# Patient Record
Sex: FEMALE | Race: WHITE
[De-identification: names, ages, dates, MRNs, and addresses within clinical notes are randomized per-mention and may not be internally consistent; named-entity substitution may affect disease eponyms.]

---

## 2017-04-18 ENCOUNTER — PRIOR ORIGINAL RECORDS (OUTPATIENT)
Dept: OTHER | Age: 82
End: 2017-04-18

## 2017-05-04 ENCOUNTER — PRIOR ORIGINAL RECORDS (OUTPATIENT)
Dept: OTHER | Age: 82
End: 2017-05-04

## 2017-05-04 LAB
CHOLESTEROL, TOTAL: 145 MG/DL
HDL CHOLESTEROL: 73 MG/DL
LDL CHOLESTEROL: 57 MG/DL
TRIGLYCERIDES: 73 MG/DL

## 2017-08-17 ENCOUNTER — HOSPITAL ENCOUNTER (EMERGENCY)
Age: 82
Discharge: HOME | End: 2017-08-17
Attending: EMERGENCY MEDICINE
Payer: OTHER MISCELLANEOUS

## 2017-08-17 VITALS
OXYGEN SATURATION: 98 % | TEMPERATURE: 98 F | SYSTOLIC BLOOD PRESSURE: 172 MMHG | HEART RATE: 62 BPM | BODY MASS INDEX: 15.7 KG/M2 | HEIGHT: 67 IN | WEIGHT: 100 LBS | RESPIRATION RATE: 18 BRPM | DIASTOLIC BLOOD PRESSURE: 98 MMHG

## 2017-08-17 DIAGNOSIS — S51.811A FOREARM LACERATION, RIGHT, INITIAL ENCOUNTER: Primary | ICD-10-CM

## 2017-08-17 PROCEDURE — 12004 RPR S/N/AX/GEN/TRK7.6-12.5CM: CPT

## 2017-08-17 PROCEDURE — 12004 RPR S/N/AX/GEN/TRK7.6-12.5CM: CPT | Performed by: NURSE PRACTITIONER

## 2017-08-17 PROCEDURE — 99283 EMERGENCY DEPT VISIT LOW MDM: CPT

## 2017-08-17 PROCEDURE — 99282 EMERGENCY DEPT VISIT SF MDM: CPT

## 2017-08-17 RX ORDER — SIMVASTATIN 20 MG
20 TABLET ORAL NIGHTLY
COMMUNITY

## 2017-08-17 NOTE — ED PROVIDER NOTES
Patient Seen in: [facility] In [place]    History   Patient presents with:  Laceration Abrasion (integumentary)    Stated Complaint: abrasion to right arm, work injury    22-year-old female who presents to the emergency room with comp All other systems reviewed and negative except as noted above. PSFH elements reviewed from today and agreed except as otherwise stated in HPI.     Physical Exam   ED Triage Vitals [08/17/17 1305]  BP: (!) 172/98  Pulse: 62  Resp: 18  Temp: 97.9 °F (36.6 ------------------------------------------------------------  MDM    I discussed the diagnosis and need for followup with their primary care physician for further evaluation and care.  Patient states they understand diagnosis, followup plan and agree with a

## 2017-08-17 NOTE — ED PROVIDER NOTES
I reviewed that chart and discussed the case. I have examined the patient and noted laceration to the right forearm, no tenderness to the right shoulder elbow wrist or hand. Radial ulnar median nerve tested and intact bilaterally.   Patient had laceration

## 2017-08-17 NOTE — ED INITIAL ASSESSMENT (HPI)
ABRASION TO RIGHT FOREARM. PT WORKS AT [employer] AND SCRAPED IT ON A DISPLAY.  STATES \"NEVER \" HAD TETANUS

## 2017-08-18 ENCOUNTER — HOSPITAL ENCOUNTER (EMERGENCY)
Age: 82
Discharge: HOME | End: 2017-08-18
Payer: OTHER MISCELLANEOUS

## 2017-08-18 VITALS
SYSTOLIC BLOOD PRESSURE: 167 MMHG | HEART RATE: 65 BPM | BODY MASS INDEX: 18.88 KG/M2 | OXYGEN SATURATION: 98 % | DIASTOLIC BLOOD PRESSURE: 82 MMHG | RESPIRATION RATE: 20 BRPM | TEMPERATURE: 99 F | WEIGHT: 100 LBS | HEIGHT: 61 IN

## 2017-08-18 DIAGNOSIS — Z51.89 VISIT FOR WOUND CHECK: Primary | ICD-10-CM

## 2017-08-18 PROCEDURE — 99281 EMR DPT VST MAYX REQ PHY/QHP: CPT

## 2017-08-18 NOTE — ED PROVIDER NOTES
Patient Seen in: [facility] Emergency Department In [place]    History   Patient presents with:  Laceration Abrasion (integumentary)    Stated Complaint: wound check-here for dressing change.     77-year-old female who presents to the emergency room for a agreed except as otherwise stated in HPI.     Physical Exam   ED Triage Vitals [08/18/17 1055]  BP: (!) 167/82  Pulse: 65  Resp: 20  Temp: 98.5 °F (36.9 °C)  Temp src: Temporal  SpO2: 98 %  O2 Device: None (Room air)    Current:BP (!) 167/82   Pulse 65   Te diagnosis)    Disposition:  Discharge    Follow-up:  [address] [place] [address]  [phone]  In 3 day(s)        Medications Prescribed:  Current Discharge Medication List

## 2017-10-26 ENCOUNTER — PRIOR ORIGINAL RECORDS (OUTPATIENT)
Dept: OTHER | Age: 82
End: 2017-10-26

## 2018-04-12 ENCOUNTER — PRIOR ORIGINAL RECORDS (OUTPATIENT)
Dept: OTHER | Age: 83
End: 2018-04-12

## 2018-04-26 ENCOUNTER — PRIOR ORIGINAL RECORDS (OUTPATIENT)
Dept: OTHER | Age: 83
End: 2018-04-26

## 2018-05-10 LAB
ALBUMIN: 4.1 G/DL
ALKALINE PHOSPHATATE(ALK PHOS): 119 IU/L
BILIRUBIN TOTAL: 0.6 MG/DL
BUN: 22 MG/DL
CALCIUM: 9.4 MG/DL
CHLORIDE: 105 MEQ/L
CHOLESTEROL, TOTAL: 148 MG/DL
CREATININE, SERUM: 0.92 MG/DL
GLOBULIN: 2.2 G/DL
GLUCOSE: 86 MG/DL
HDL CHOLESTEROL: 73 MG/DL
HEMATOCRIT: 43.1 %
HEMOGLOBIN: 14.6 G/DL
LDL CHOLESTEROL: 61 MG/DL
PLATELETS: 255 K/UL
POTASSIUM, SERUM: 4.2 MEQ/L
PROTEIN, TOTAL: 6.3 G/DL
RED BLOOD COUNT: 4.8 X 10-6/U
SGOT (AST): 25 IU/L
SGPT (ALT): 16 IU/L
SODIUM: 142 MEQ/L
TRIGLYCERIDES: 64 MG/DL
VITAMIN D 25-OH: 63 NG/ML
WHITE BLOOD COUNT: 6.9 X 10-3/U

## 2018-05-11 ENCOUNTER — PRIOR ORIGINAL RECORDS (OUTPATIENT)
Dept: OTHER | Age: 83
End: 2018-05-11

## 2018-05-22 ENCOUNTER — HOSPITAL ENCOUNTER (OUTPATIENT)
Dept: CV DIAGNOSTICS | Age: 83
Discharge: HOME | End: 2018-05-22
Attending: INTERNAL MEDICINE
Payer: MEDICARE

## 2018-05-22 DIAGNOSIS — I65.23 BILATERAL CAROTID ARTERY STENOSIS: ICD-10-CM

## 2018-05-25 ENCOUNTER — PRIOR ORIGINAL RECORDS (OUTPATIENT)
Dept: OTHER | Age: 83
End: 2018-05-25

## 2018-05-29 ENCOUNTER — PRIOR ORIGINAL RECORDS (OUTPATIENT)
Dept: OTHER | Age: 83
End: 2018-05-29

## 2018-10-18 ENCOUNTER — PRIOR ORIGINAL RECORDS (OUTPATIENT)
Dept: OTHER | Age: 83
End: 2018-10-18

## 2018-10-18 ENCOUNTER — MYAURORA ACCOUNT LINK (OUTPATIENT)
Dept: OTHER | Age: 83
End: 2018-10-18

## 2019-01-01 ENCOUNTER — EXTERNAL RECORD (OUTPATIENT)
Dept: HEALTH INFORMATION MANAGEMENT | Facility: OTHER | Age: 84
End: 2019-01-01

## 2019-02-28 VITALS
WEIGHT: 100 LBS | HEIGHT: 62 IN | SYSTOLIC BLOOD PRESSURE: 136 MMHG | BODY MASS INDEX: 18.4 KG/M2 | DIASTOLIC BLOOD PRESSURE: 62 MMHG | HEART RATE: 56 BPM

## 2019-02-28 VITALS — SYSTOLIC BLOOD PRESSURE: 102 MMHG | DIASTOLIC BLOOD PRESSURE: 56 MMHG | HEART RATE: 64 BPM | WEIGHT: 101 LBS

## 2019-02-28 VITALS — WEIGHT: 96 LBS | DIASTOLIC BLOOD PRESSURE: 50 MMHG | HEART RATE: 64 BPM | SYSTOLIC BLOOD PRESSURE: 102 MMHG

## 2019-03-01 VITALS
BODY MASS INDEX: 18.77 KG/M2 | HEART RATE: 68 BPM | DIASTOLIC BLOOD PRESSURE: 60 MMHG | SYSTOLIC BLOOD PRESSURE: 102 MMHG | WEIGHT: 102 LBS | HEIGHT: 62 IN

## 2019-03-22 RX ORDER — SIMVASTATIN 40 MG
TABLET ORAL
COMMUNITY
Start: 2016-09-09

## 2019-04-17 LAB
ALT SERPL-CCNC: 13 U/L (ref 6–29)
AST SERPL-CCNC: 25 U/L (ref 10–35)
BUN SERPL-MCNC: 24 MG/DL (ref 7–25)
BUN/CREAT SERPL: 22 (CALC) (ref 6–22)
CALCIUM SERPL-MCNC: 9.9 MG/DL (ref 8.6–10.4)
CHLORIDE SERPL-SCNC: 106 MMOL/L (ref 98–110)
CHOLEST SERPL-MCNC: 148 MG/DL
CHOLEST/HDLC SERPL: 2 (CALC)
CO2 SERPL-SCNC: 32 MMOL/L (ref 20–32)
CREAT SERPL-MCNC: 1.1 MG/DL (ref 0.6–0.88)
GFRSERPLBLD MDRD-ARVRAT: 44 ML/MIN/1.73M2
GLUCOSE SERPL-MCNC: 95 MG/DL (ref 65–99)
HDLC SERPL-MCNC: 73 MG/DL
LDLC SERPL CALC-MCNC: 61 MG/DL (CALC)
NONHDLC SERPL-MCNC: 75 MG/DL (CALC)
POTASSIUM SERPL-SCNC: 3.9 MMOL/L (ref 3.5–5.3)
SODIUM SERPL-SCNC: 143 MMOL/L (ref 135–146)
TRIGL SERPL-MCNC: 64 MG/DL

## 2019-04-25 ENCOUNTER — TELEPHONE (OUTPATIENT)
Dept: CARDIOLOGY | Age: 84
End: 2019-04-25

## 2019-05-02 ENCOUNTER — OFFICE VISIT (OUTPATIENT)
Dept: CARDIOLOGY | Age: 84
End: 2019-05-02

## 2019-05-02 VITALS
BODY MASS INDEX: 18.58 KG/M2 | DIASTOLIC BLOOD PRESSURE: 52 MMHG | HEIGHT: 62 IN | HEART RATE: 60 BPM | WEIGHT: 101 LBS | SYSTOLIC BLOOD PRESSURE: 114 MMHG

## 2019-05-02 DIAGNOSIS — I25.10 CAD IN NATIVE ARTERY: Primary | ICD-10-CM

## 2019-05-02 DIAGNOSIS — Z95.5 HISTORY OF HEART ARTERY STENT: ICD-10-CM

## 2019-05-02 DIAGNOSIS — I65.23 ASYMPTOMATIC CAROTID ARTERY STENOSIS, BILATERAL: ICD-10-CM

## 2019-05-02 DIAGNOSIS — E78.00 PURE HYPERCHOLESTEROLEMIA: ICD-10-CM

## 2019-05-02 PROCEDURE — 99214 OFFICE O/P EST MOD 30 MIN: CPT | Performed by: INTERNAL MEDICINE

## 2019-07-23 ENCOUNTER — EXTERNAL RECORD (OUTPATIENT)
Dept: HEALTH INFORMATION MANAGEMENT | Facility: OTHER | Age: 84
End: 2019-07-23

## 2019-08-13 ENCOUNTER — MED INFO FORMS (OUTPATIENT)
Dept: DERMATOLOGY | Age: 84
End: 2019-08-13

## 2019-08-21 ENCOUNTER — OFFICE VISIT (OUTPATIENT)
Dept: DERMATOLOGY | Age: 84
End: 2019-08-21

## 2019-08-21 DIAGNOSIS — L65.8 FEMALE PATTERN ALOPECIA: Primary | ICD-10-CM

## 2019-08-21 PROCEDURE — 99202 OFFICE O/P NEW SF 15 MIN: CPT | Performed by: DERMATOLOGY

## 2019-11-21 ENCOUNTER — OFFICE VISIT (OUTPATIENT)
Dept: CARDIOLOGY | Age: 84
End: 2019-11-21

## 2019-11-21 DIAGNOSIS — Z95.5 HISTORY OF HEART ARTERY STENT: ICD-10-CM

## 2019-11-21 DIAGNOSIS — I25.10 CAD IN NATIVE ARTERY: Primary | ICD-10-CM

## 2019-11-21 DIAGNOSIS — E78.00 PURE HYPERCHOLESTEROLEMIA: ICD-10-CM

## 2019-11-21 DIAGNOSIS — I65.23 ASYMPTOMATIC CAROTID ARTERY STENOSIS, BILATERAL: ICD-10-CM

## 2019-11-21 ASSESSMENT — PATIENT HEALTH QUESTIONNAIRE - PHQ9
SUM OF ALL RESPONSES TO PHQ9 QUESTIONS 1 AND 2: 0
1. LITTLE INTEREST OR PLEASURE IN DOING THINGS: NOT AT ALL
2. FEELING DOWN, DEPRESSED OR HOPELESS: NOT AT ALL
SUM OF ALL RESPONSES TO PHQ9 QUESTIONS 1 AND 2: 0
SUM OF ALL RESPONSES TO PHQ9 QUESTIONS 1 AND 2: 0
2. FEELING DOWN, DEPRESSED OR HOPELESS: NOT AT ALL
1. LITTLE INTEREST OR PLEASURE IN DOING THINGS: NOT AT ALL

## 2020-01-23 ENCOUNTER — OFFICE VISIT (OUTPATIENT)
Dept: CARDIOLOGY | Age: 85
End: 2020-01-23

## 2020-01-23 VITALS
BODY MASS INDEX: 17.85 KG/M2 | WEIGHT: 97 LBS | HEIGHT: 62 IN | HEART RATE: 60 BPM | SYSTOLIC BLOOD PRESSURE: 102 MMHG | DIASTOLIC BLOOD PRESSURE: 50 MMHG

## 2020-01-23 DIAGNOSIS — E78.00 PURE HYPERCHOLESTEROLEMIA: ICD-10-CM

## 2020-01-23 DIAGNOSIS — Z95.5 HISTORY OF HEART ARTERY STENT: ICD-10-CM

## 2020-01-23 DIAGNOSIS — I65.23 ASYMPTOMATIC CAROTID ARTERY STENOSIS, BILATERAL: ICD-10-CM

## 2020-01-23 DIAGNOSIS — I25.10 CAD IN NATIVE ARTERY: Primary | ICD-10-CM

## 2020-01-23 PROCEDURE — 99214 OFFICE O/P EST MOD 30 MIN: CPT | Performed by: INTERNAL MEDICINE

## 2020-01-23 ASSESSMENT — PATIENT HEALTH QUESTIONNAIRE - PHQ9
SUM OF ALL RESPONSES TO PHQ9 QUESTIONS 1 AND 2: 0
2. FEELING DOWN, DEPRESSED OR HOPELESS: NOT AT ALL
1. LITTLE INTEREST OR PLEASURE IN DOING THINGS: NOT AT ALL
SUM OF ALL RESPONSES TO PHQ9 QUESTIONS 1 AND 2: 0

## 2020-03-29 ENCOUNTER — APPOINTMENT (OUTPATIENT)
Dept: GENERAL RADIOLOGY | Facility: HOSPITAL | Age: 85
DRG: 470 | End: 2020-03-29
Attending: EMERGENCY MEDICINE
Payer: OTHER MISCELLANEOUS

## 2020-03-29 ENCOUNTER — HOSPITAL ENCOUNTER (INPATIENT)
Facility: HOSPITAL | Age: 85
LOS: 3 days | Discharge: HOME HEALTH | DRG: 470 | End: 2020-04-01
Attending: EMERGENCY MEDICINE | Admitting: INTERNAL MEDICINE
Payer: OTHER MISCELLANEOUS
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
## 2020-03-29 DIAGNOSIS — S72.002A CLOSED LEFT HIP FRACTURE, INITIAL ENCOUNTER (HCC): Primary | ICD-10-CM

## 2020-03-29 DIAGNOSIS — S72.009A HIP FRACTURE (HCC): ICD-10-CM

## 2020-03-29 PROBLEM — D72.829 LEUKOCYTOSIS: Status: ACTIVE | Noted: 2020-03-29

## 2020-03-29 PROBLEM — R79.89 AZOTEMIA: Status: ACTIVE | Noted: 2020-03-29

## 2020-03-29 PROBLEM — R73.9 HYPERGLYCEMIA: Status: ACTIVE | Noted: 2020-03-29

## 2020-03-29 PROBLEM — E87.20 METABOLIC ACIDOSIS: Status: ACTIVE | Noted: 2020-03-29

## 2020-03-29 PROBLEM — E87.1 HYPONATREMIA: Status: ACTIVE | Noted: 2020-03-29

## 2020-03-29 PROBLEM — E87.2 METABOLIC ACIDOSIS: Status: ACTIVE | Noted: 2020-03-29

## 2020-03-29 PROCEDURE — 71045 X-RAY EXAM CHEST 1 VIEW: CPT | Performed by: EMERGENCY MEDICINE

## 2020-03-29 PROCEDURE — 99223 1ST HOSP IP/OBS HIGH 75: CPT | Performed by: INTERNAL MEDICINE

## 2020-03-29 PROCEDURE — 73502 X-RAY EXAM HIP UNI 2-3 VIEWS: CPT | Performed by: EMERGENCY MEDICINE

## 2020-03-29 RX ORDER — METOCLOPRAMIDE HYDROCHLORIDE 5 MG/ML
10 INJECTION INTRAMUSCULAR; INTRAVENOUS EVERY 8 HOURS PRN
Status: DISCONTINUED | OUTPATIENT
Start: 2020-03-29 | End: 2020-03-29

## 2020-03-29 RX ORDER — DOCUSATE SODIUM 100 MG/1
100 CAPSULE, LIQUID FILLED ORAL 2 TIMES DAILY
Status: DISCONTINUED | OUTPATIENT
Start: 2020-03-29 | End: 2020-03-30

## 2020-03-29 RX ORDER — HYDROCODONE BITARTRATE AND ACETAMINOPHEN 5; 325 MG/1; MG/1
2 TABLET ORAL EVERY 4 HOURS PRN
Status: DISCONTINUED | OUTPATIENT
Start: 2020-03-29 | End: 2020-03-30

## 2020-03-29 RX ORDER — HYDROCODONE BITARTRATE AND ACETAMINOPHEN 5; 325 MG/1; MG/1
1 TABLET ORAL EVERY 4 HOURS PRN
Status: DISCONTINUED | OUTPATIENT
Start: 2020-03-29 | End: 2020-03-30

## 2020-03-29 RX ORDER — ACETAMINOPHEN 325 MG/1
650 TABLET ORAL EVERY 4 HOURS PRN
Status: DISCONTINUED | OUTPATIENT
Start: 2020-03-29 | End: 2020-03-30

## 2020-03-29 RX ORDER — ATORVASTATIN CALCIUM 10 MG/1
10 TABLET, FILM COATED ORAL NIGHTLY
Status: DISCONTINUED | OUTPATIENT
Start: 2020-03-29 | End: 2020-04-01

## 2020-03-29 RX ORDER — HYDROMORPHONE HYDROCHLORIDE 1 MG/ML
0.2 INJECTION, SOLUTION INTRAMUSCULAR; INTRAVENOUS; SUBCUTANEOUS EVERY 2 HOUR PRN
Status: DISCONTINUED | OUTPATIENT
Start: 2020-03-29 | End: 2020-03-30

## 2020-03-29 RX ORDER — HEPARIN SODIUM 5000 [USP'U]/ML
5000 INJECTION, SOLUTION INTRAVENOUS; SUBCUTANEOUS EVERY 8 HOURS SCHEDULED
Status: DISCONTINUED | OUTPATIENT
Start: 2020-03-29 | End: 2020-03-30

## 2020-03-29 RX ORDER — ONDANSETRON 2 MG/ML
4 INJECTION INTRAMUSCULAR; INTRAVENOUS EVERY 6 HOURS PRN
Status: DISCONTINUED | OUTPATIENT
Start: 2020-03-29 | End: 2020-04-01

## 2020-03-29 RX ORDER — SODIUM CHLORIDE 9 MG/ML
INJECTION, SOLUTION INTRAVENOUS CONTINUOUS
Status: ACTIVE | OUTPATIENT
Start: 2020-03-29 | End: 2020-03-29

## 2020-03-29 RX ORDER — SODIUM CHLORIDE 9 MG/ML
1000 INJECTION, SOLUTION INTRAVENOUS ONCE
Status: COMPLETED | OUTPATIENT
Start: 2020-03-29 | End: 2020-03-29

## 2020-03-29 RX ORDER — MORPHINE SULFATE 4 MG/ML
2 INJECTION, SOLUTION INTRAMUSCULAR; INTRAVENOUS ONCE
Status: COMPLETED | OUTPATIENT
Start: 2020-03-29 | End: 2020-03-29

## 2020-03-29 RX ORDER — HYDROMORPHONE HYDROCHLORIDE 1 MG/ML
0.8 INJECTION, SOLUTION INTRAMUSCULAR; INTRAVENOUS; SUBCUTANEOUS EVERY 2 HOUR PRN
Status: DISCONTINUED | OUTPATIENT
Start: 2020-03-29 | End: 2020-03-30

## 2020-03-29 RX ORDER — HYDROMORPHONE HYDROCHLORIDE 1 MG/ML
0.4 INJECTION, SOLUTION INTRAMUSCULAR; INTRAVENOUS; SUBCUTANEOUS EVERY 2 HOUR PRN
Status: DISCONTINUED | OUTPATIENT
Start: 2020-03-29 | End: 2020-03-30

## 2020-03-29 RX ORDER — POLYETHYLENE GLYCOL 3350 17 G/17G
17 POWDER, FOR SOLUTION ORAL DAILY PRN
Status: DISCONTINUED | OUTPATIENT
Start: 2020-03-29 | End: 2020-03-30

## 2020-03-29 RX ORDER — BISACODYL 10 MG
10 SUPPOSITORY, RECTAL RECTAL
Status: DISCONTINUED | OUTPATIENT
Start: 2020-03-29 | End: 2020-03-30

## 2020-03-29 RX ORDER — METOCLOPRAMIDE HYDROCHLORIDE 5 MG/ML
5 INJECTION INTRAMUSCULAR; INTRAVENOUS EVERY 8 HOURS PRN
Status: DISCONTINUED | OUTPATIENT
Start: 2020-03-29 | End: 2020-04-01

## 2020-03-29 NOTE — ED NOTES
, Abran Juárez, called for an update and asked this RN to contact him at [phone]. This RN called him back and it went to voicemail. Left voicemail and number for him to return our call.

## 2020-03-29 NOTE — ED INITIAL ASSESSMENT (HPI)
81 yo F presents with left hip pain and xray results from physician immediate care showing possible left femoral neck fracture. Pain only with movement. Unable to ambulate.  Pt reports her feet got caught in the grocery cart while working at [employer] and

## 2020-03-29 NOTE — ED NOTES
Per MD verbal order, give 2 mg of MS for pain. Patient is requesting pain medication but is unable to rate her pain on pain scale at this time.

## 2020-03-29 NOTE — ED NOTES
Cheikh Roque at pt's work who drove patient here states that if we cannot get ahold of  and patient is discharged that we can call him   Phone: [phone]

## 2020-03-29 NOTE — H&P
[facility] HOSPITALIST  History and Physical     Kimmy Patel Patient Status:  Emergency    1927 MRN [id number]   Location [facility] Attending Inge Suarez MD   Hosp Day # 0 PCP Rissa Muñoz     Chief Complaint: Wing Salcedo daily., Disp: , Rfl:         Review of Systems:   A comprehensive 14 point review of systems was completed. Pertinent positives and negatives noted in the HPI.     Physical Exam:    /56   Pulse 62   Temp 98.2 °F (36.8 °C) (Temporal)   Resp 17   Ht Fall  3. HTN  4. HLD  5.  CAD history on baby asa    Quality:  · DVT Prophylaxis: Heparin  · CODE status: Full  · Gibbons: no    Plan of care discussed with patient in ED    Mitul Griffiths DO  3/29/2020

## 2020-03-29 NOTE — ED PROVIDER NOTES
Patient Seen in: [facility] Emergency Department      History   Patient presents with:  Fall    Stated Complaint: fall while at work at [employer], had XR which showed probable femoral neck fracture*    HPI    Patient is a pleasant  70-year-old female pr (Temporal)   Resp 14   Ht 165.1 cm (5' 5\")   Wt 43.1 kg   SpO2 98%   BMI 15.81 kg/m²         Physical Exam    Vital signs noted. GENERAL: Patient is awake and alert, resting comfortably on the cart, in no apparent distress.    HEENT: Head is without evid following orders were created for panel order CBC WITH DIFFERENTIAL WITH PLATELET.   Procedure                               Abnormality         Status                     ---------                               -----------         ------ hip.  There is complete obliteration of the right hip joint with flattening and deformity of the right femoral head, subchondral sclerosis and hyper trophic osseous changes consistent with marked degenerative change.   Also noted is degenerative change invo Closed left hip fracture, initial encounter [facility]) S72.002A 3/29/2020 Unknown    Hyperglycemia R73.9 3/29/2020 Yes    Hyponatremia E87.1 3/29/2020 Yes    Leukocytosis D72.829 5/66/8753 Yes    Metabolic acidosis O96.3 1/04/3131 Yes

## 2020-03-30 ENCOUNTER — APPOINTMENT (OUTPATIENT)
Dept: GENERAL RADIOLOGY | Facility: HOSPITAL | Age: 85
DRG: 470 | End: 2020-03-30
Attending: ORTHOPAEDIC SURGERY
Payer: OTHER MISCELLANEOUS

## 2020-03-30 ENCOUNTER — ANESTHESIA (OUTPATIENT)
Dept: SURGERY | Facility: HOSPITAL | Age: 85
DRG: 470 | End: 2020-03-30
Payer: OTHER MISCELLANEOUS

## 2020-03-30 ENCOUNTER — ANESTHESIA EVENT (OUTPATIENT)
Dept: SURGERY | Facility: HOSPITAL | Age: 85
DRG: 470 | End: 2020-03-30
Payer: OTHER MISCELLANEOUS

## 2020-03-30 PROCEDURE — 0SRS019 REPLACEMENT OF LEFT HIP JOINT, FEMORAL SURFACE WITH METAL SYNTHETIC SUBSTITUTE, CEMENTED, OPEN APPROACH: ICD-10-PCS | Performed by: ORTHOPAEDIC SURGERY

## 2020-03-30 PROCEDURE — 99221 1ST HOSP IP/OBS SF/LOW 40: CPT | Performed by: ORTHOPAEDIC SURGERY

## 2020-03-30 PROCEDURE — 99232 SBSQ HOSP IP/OBS MODERATE 35: CPT | Performed by: INTERNAL MEDICINE

## 2020-03-30 PROCEDURE — 27236 TREAT THIGH FRACTURE: CPT | Performed by: ORTHOPAEDIC SURGERY

## 2020-03-30 PROCEDURE — 73501 X-RAY EXAM HIP UNI 1 VIEW: CPT | Performed by: ORTHOPAEDIC SURGERY

## 2020-03-30 PROCEDURE — 3E0T3BZ INTRODUCTION OF ANESTHETIC AGENT INTO PERIPHERAL NERVES AND PLEXI, PERCUTANEOUS APPROACH: ICD-10-PCS | Performed by: ANESTHESIOLOGY

## 2020-03-30 DEVICE — IMPLANTABLE DEVICE: Type: IMPLANTABLE DEVICE | Site: HIP | Status: FUNCTIONAL

## 2020-03-30 DEVICE — CEMENT BONE RADIOPAQ HOWMEDICA: Type: IMPLANTABLE DEVICE | Site: HIP | Status: FUNCTIONAL

## 2020-03-30 DEVICE — KIT FEM BONE CEMENT RESTRICTOR: Type: IMPLANTABLE DEVICE | Site: HIP | Status: FUNCTIONAL

## 2020-03-30 RX ORDER — TRANEXAMIC ACID 10 MG/ML
INJECTION, SOLUTION INTRAVENOUS AS NEEDED
Status: DISCONTINUED | OUTPATIENT
Start: 2020-03-30 | End: 2020-03-30 | Stop reason: SURG

## 2020-03-30 RX ORDER — CEFAZOLIN SODIUM/WATER 2 G/20 ML
2 SYRINGE (ML) INTRAVENOUS EVERY 8 HOURS
Status: COMPLETED | OUTPATIENT
Start: 2020-03-31 | End: 2020-03-31

## 2020-03-30 RX ORDER — BISACODYL 10 MG
10 SUPPOSITORY, RECTAL RECTAL
Status: DISCONTINUED | OUTPATIENT
Start: 2020-03-30 | End: 2020-04-01

## 2020-03-30 RX ORDER — CALCIUM CARBONATE/VITAMIN D3 250-3.125
2 TABLET ORAL
Status: DISCONTINUED | OUTPATIENT
Start: 2020-03-31 | End: 2020-04-01

## 2020-03-30 RX ORDER — LIDOCAINE HYDROCHLORIDE 10 MG/ML
INJECTION, SOLUTION EPIDURAL; INFILTRATION; INTRACAUDAL; PERINEURAL AS NEEDED
Status: DISCONTINUED | OUTPATIENT
Start: 2020-03-30 | End: 2020-03-30 | Stop reason: SURG

## 2020-03-30 RX ORDER — ASPIRIN 325 MG
325 TABLET ORAL 2 TIMES DAILY
Status: DISCONTINUED | OUTPATIENT
Start: 2020-03-31 | End: 2020-04-01

## 2020-03-30 RX ORDER — ONDANSETRON 2 MG/ML
4 INJECTION INTRAMUSCULAR; INTRAVENOUS AS NEEDED
Status: DISCONTINUED | OUTPATIENT
Start: 2020-03-30 | End: 2020-03-30 | Stop reason: HOSPADM

## 2020-03-30 RX ORDER — TRAMADOL HYDROCHLORIDE 50 MG/1
50 TABLET ORAL EVERY 6 HOURS PRN
Status: DISCONTINUED | OUTPATIENT
Start: 2020-03-30 | End: 2020-03-31

## 2020-03-30 RX ORDER — HYDROMORPHONE HYDROCHLORIDE 1 MG/ML
0.4 INJECTION, SOLUTION INTRAMUSCULAR; INTRAVENOUS; SUBCUTANEOUS EVERY 5 MIN PRN
Status: DISCONTINUED | OUTPATIENT
Start: 2020-03-30 | End: 2020-03-30 | Stop reason: HOSPADM

## 2020-03-30 RX ORDER — TRANEXAMIC ACID 10 MG/ML
1000 INJECTION, SOLUTION INTRAVENOUS ONCE
Status: DISCONTINUED | OUTPATIENT
Start: 2020-03-30 | End: 2020-03-30 | Stop reason: HOSPADM

## 2020-03-30 RX ORDER — DIPHENHYDRAMINE HYDROCHLORIDE 50 MG/ML
12.5 INJECTION INTRAMUSCULAR; INTRAVENOUS ONCE AS NEEDED
Status: COMPLETED | OUTPATIENT
Start: 2020-03-30 | End: 2020-03-30

## 2020-03-30 RX ORDER — SENNOSIDES 8.6 MG
17.2 TABLET ORAL NIGHTLY
Status: DISCONTINUED | OUTPATIENT
Start: 2020-03-30 | End: 2020-04-01

## 2020-03-30 RX ORDER — POTASSIUM CHLORIDE 14.9 MG/ML
20 INJECTION INTRAVENOUS ONCE
Status: COMPLETED | OUTPATIENT
Start: 2020-03-30 | End: 2020-03-30

## 2020-03-30 RX ORDER — HYDROMORPHONE HYDROCHLORIDE 1 MG/ML
0.2 INJECTION, SOLUTION INTRAMUSCULAR; INTRAVENOUS; SUBCUTANEOUS EVERY 2 HOUR PRN
Status: DISCONTINUED | OUTPATIENT
Start: 2020-03-30 | End: 2020-04-01

## 2020-03-30 RX ORDER — PHENYLEPHRINE HCL 10 MG/ML
VIAL (ML) INJECTION AS NEEDED
Status: DISCONTINUED | OUTPATIENT
Start: 2020-03-30 | End: 2020-03-30 | Stop reason: SURG

## 2020-03-30 RX ORDER — SODIUM CHLORIDE, SODIUM LACTATE, POTASSIUM CHLORIDE, CALCIUM CHLORIDE 600; 310; 30; 20 MG/100ML; MG/100ML; MG/100ML; MG/100ML
INJECTION, SOLUTION INTRAVENOUS CONTINUOUS
Status: DISCONTINUED | OUTPATIENT
Start: 2020-03-30 | End: 2020-03-30 | Stop reason: HOSPADM

## 2020-03-30 RX ORDER — DEXAMETHASONE SODIUM PHOSPHATE 4 MG/ML
VIAL (ML) INJECTION AS NEEDED
Status: DISCONTINUED | OUTPATIENT
Start: 2020-03-30 | End: 2020-03-30 | Stop reason: SURG

## 2020-03-30 RX ORDER — SODIUM PHOSPHATE, DIBASIC AND SODIUM PHOSPHATE, MONOBASIC 7; 19 G/133ML; G/133ML
1 ENEMA RECTAL ONCE AS NEEDED
Status: DISCONTINUED | OUTPATIENT
Start: 2020-03-30 | End: 2020-04-01

## 2020-03-30 RX ORDER — POLYETHYLENE GLYCOL 3350 17 G/17G
17 POWDER, FOR SOLUTION ORAL DAILY PRN
Status: DISCONTINUED | OUTPATIENT
Start: 2020-03-30 | End: 2020-04-01

## 2020-03-30 RX ORDER — CELECOXIB 200 MG/1
200 CAPSULE ORAL 2 TIMES DAILY
COMMUNITY

## 2020-03-30 RX ORDER — DIPHENHYDRAMINE HYDROCHLORIDE 50 MG/ML
INJECTION INTRAMUSCULAR; INTRAVENOUS
Status: COMPLETED
Start: 2020-03-30 | End: 2020-03-30

## 2020-03-30 RX ORDER — DOCUSATE SODIUM 100 MG/1
100 CAPSULE, LIQUID FILLED ORAL 2 TIMES DAILY
Status: DISCONTINUED | OUTPATIENT
Start: 2020-03-30 | End: 2020-04-01

## 2020-03-30 RX ORDER — NALOXONE HYDROCHLORIDE 0.4 MG/ML
80 INJECTION, SOLUTION INTRAMUSCULAR; INTRAVENOUS; SUBCUTANEOUS AS NEEDED
Status: DISCONTINUED | OUTPATIENT
Start: 2020-03-30 | End: 2020-03-30 | Stop reason: HOSPADM

## 2020-03-30 RX ORDER — DIPHENHYDRAMINE HYDROCHLORIDE 50 MG/ML
25 INJECTION INTRAMUSCULAR; INTRAVENOUS ONCE AS NEEDED
Status: DISCONTINUED | OUTPATIENT
Start: 2020-03-30 | End: 2020-03-30 | Stop reason: HOSPADM

## 2020-03-30 RX ORDER — SODIUM CHLORIDE, SODIUM LACTATE, POTASSIUM CHLORIDE, CALCIUM CHLORIDE 600; 310; 30; 20 MG/100ML; MG/100ML; MG/100ML; MG/100ML
INJECTION, SOLUTION INTRAVENOUS CONTINUOUS
Status: DISCONTINUED | OUTPATIENT
Start: 2020-03-30 | End: 2020-03-31

## 2020-03-30 RX ORDER — CEFAZOLIN SODIUM 1 G/3ML
INJECTION, POWDER, FOR SOLUTION INTRAMUSCULAR; INTRAVENOUS AS NEEDED
Status: DISCONTINUED | OUTPATIENT
Start: 2020-03-30 | End: 2020-03-30 | Stop reason: SURG

## 2020-03-30 RX ORDER — HYDROMORPHONE HYDROCHLORIDE 2 MG/1
1 TABLET ORAL
Status: DISCONTINUED | OUTPATIENT
Start: 2020-03-30 | End: 2020-04-01

## 2020-03-30 RX ORDER — ACETAMINOPHEN 500 MG
1000 TABLET ORAL EVERY 8 HOURS
Status: DISCONTINUED | OUTPATIENT
Start: 2020-03-30 | End: 2020-04-01

## 2020-03-30 RX ORDER — ROCURONIUM BROMIDE 10 MG/ML
INJECTION, SOLUTION INTRAVENOUS AS NEEDED
Status: DISCONTINUED | OUTPATIENT
Start: 2020-03-30 | End: 2020-03-30 | Stop reason: SURG

## 2020-03-30 RX ADMIN — CEFAZOLIN SODIUM 2 G: 1 INJECTION, POWDER, FOR SOLUTION INTRAMUSCULAR; INTRAVENOUS at 16:32:00

## 2020-03-30 RX ADMIN — LIDOCAINE HYDROCHLORIDE 50 MG: 10 INJECTION, SOLUTION EPIDURAL; INFILTRATION; INTRACAUDAL; PERINEURAL at 16:18:00

## 2020-03-30 RX ADMIN — TRANEXAMIC ACID 1000 MG: 10 INJECTION, SOLUTION INTRAVENOUS at 16:48:00

## 2020-03-30 RX ADMIN — PHENYLEPHRINE HCL 50 MCG: 10 MG/ML VIAL (ML) INJECTION at 16:28:00

## 2020-03-30 RX ADMIN — ROCURONIUM BROMIDE 50 MG: 10 INJECTION, SOLUTION INTRAVENOUS at 16:18:00

## 2020-03-30 RX ADMIN — DEXAMETHASONE SODIUM PHOSPHATE 4 MG: 4 MG/ML VIAL (ML) INJECTION at 16:40:00

## 2020-03-30 RX ADMIN — PHENYLEPHRINE HCL 50 MCG: 10 MG/ML VIAL (ML) INJECTION at 16:49:00

## 2020-03-30 RX ADMIN — PHENYLEPHRINE HCL 50 MCG: 10 MG/ML VIAL (ML) INJECTION at 16:35:00

## 2020-03-30 NOTE — PROGRESS NOTES
JA[facility] HOSPITALIST  Progress Note     Delores Kocher Patient Status:  Inpatient    1927 MRN [id number]   [facility] 2NE-A Attending Marcial Chavez DO   Hosp Day # 1 PCP Criss Reyes     Chief Complaint: mech fall  Left hip injur 2. Analgesia, bowel regiment  3. PT/OT   Post op   4.   2. Mechanical Fall  3. HTN  4. HLD  5.  CAD history on baby asa     PLAN:  Total left hip today cbc bmp am   Try to avoid narcotics post op to avoid confusion    Labs in am     Quality:  · DVT Prophy

## 2020-03-30 NOTE — CONSULTS
[facility] Ortho Consult Note    CC: left hip pain    HPI: This 80year old female admitted from ER for mechanical fall with left hip pain. Patient works at [employer] and was trying to reach around a cart when she fell over onto her left side.  Unable to bear weight d traumatic displaced left femoral neck fracture      Labs: INR 1.04  Hgb 12.0  Cr 0.75  MRSA neg  Albumin 3.5      Assessment/Plan:  80year old female with acute closed traumatic displaced left hip femoral neck fracture.  Discussed etiology as well as treat

## 2020-03-30 NOTE — PLAN OF CARE
Pt admitted from ED after a fall at [facility] [place], operated by [facility] while she was working. Closed L hip fx. Pt is A&O. On room air. SCDs to BLE. NPO. Last BM 3/28/20. Voiding without difficulty via bedpan. Pain controlled with PO medications.  Bed alarm is on and pt reminded to [facility]

## 2020-03-30 NOTE — ANESTHESIA PROCEDURE NOTES
Regional Block  Performed by: Khloe Murray MD  Authorized by: Khloe Murray MD       General Information and Staff    Start Time:  3/30/2020 4:20 PM  End Time:  3/30/2020 4:21 PM  Anesthesiologist:  Khloe Murray MD  Performed by:   Anesthes

## 2020-03-30 NOTE — PLAN OF CARE
Pt received part of IV potassium ordered per Electrolyte Protocol. Pt c/o pain at IV site. Potassium IV rate slowed down. Pt still c/o pain to IV site. IV site flushed. Pt refusing for IV potassium to continue. IV potassium removed.  Next Potassium level to

## 2020-03-30 NOTE — ANESTHESIA PROCEDURE NOTES
Airway  Date/Time: 3/30/2020 4:18 PM  Urgency: elective      General Information and Staff    Patient location during procedure: OR  Anesthesiologist: Hiral Ren MD  Performed: anesthesiologist     Indications and Patient Condition  Indications for

## 2020-03-30 NOTE — ANESTHESIA PREPROCEDURE EVALUATION
PRE-OP EVALUATION    Patient Name: Gisela Henson    Pre-op Diagnosis: Hip fracture ([address] Jake Hand    Procedure(s):  LEFT POSTERIOR CEMENTED HEMIARTHROPLASTY    Surgeon(s) and Role:     María Elena Wynn MD - Primary    Pre-op vitals reviewed.   Temp: 17 g, Oral, Daily PRN  bisacodyl (DULCOLAX) rectal suppository 10 mg, 10 mg, Rectal, Daily PRN  ondansetron HCl (ZOFRAN) injection 4 mg, 4 mg, Intravenous, Q6H PRN  Metoclopramide HCl (REGLAN) injection 5 mg, 5 mg, Intravenous, Q8H PRN        Outpatient Me ROM: full Cardiovascular    Cardiovascular exam normal.  Rhythm: regular  Rate: normal  (-) murmur   Dental    No notable dental history. Pulmonary    Pulmonary exam normal.  Breath sounds clear to auscultation bilaterally.                Other find

## 2020-03-30 NOTE — PROGRESS NOTES
Pharmacy renal dose adjustment for metoclopramide (Reglan)    Gene Heading has been prescribed metoclopramide 10 mg every 8 hours as needed for nausea/vomiting. Estimated Creatinine Clearance: 28.1 mL/min (based on SCr of 0.87 mg/dL).     The estim

## 2020-03-31 PROBLEM — I10 ESSENTIAL HYPERTENSION: Chronic | Status: ACTIVE | Noted: 2020-03-31

## 2020-03-31 PROBLEM — E78.5 HYPERLIPIDEMIA: Status: ACTIVE | Noted: 2020-03-31

## 2020-03-31 PROBLEM — I25.10 CAD (CORONARY ARTERY DISEASE): Chronic | Status: ACTIVE | Noted: 2020-03-31

## 2020-03-31 PROCEDURE — 99232 SBSQ HOSP IP/OBS MODERATE 35: CPT | Performed by: INTERNAL MEDICINE

## 2020-03-31 RX ORDER — TRAMADOL HYDROCHLORIDE 50 MG/1
50 TABLET ORAL EVERY 12 HOURS PRN
Status: DISCONTINUED | OUTPATIENT
Start: 2020-03-31 | End: 2020-04-01

## 2020-03-31 NOTE — CM/SW NOTE
03/31/20 1100   CM/SW Referral Data   Referral Source Social Work (self-referral)   Reason for Referral Discharge planning   Informant Patient   Patient Info   Patient's Mental Status Alert;Oriented   Patient's Home Environment [facility]

## 2020-03-31 NOTE — PROGRESS NOTES
Received from [facility] bed,S/P LT. HIP CEMENTED HEMIARTHROPLASTY,IVF infusing,dressing dry/intact,abductor pillow in placed. Routine post-op care rendered,will monitor.

## 2020-03-31 NOTE — PAYOR COMM NOTE
--------------  ADMISSION REVIEW     Payor: WORKERS COMP  Subscriber #:  [id number]  Authorization Number: PENDING    Admit date: 3/29/20  Admit time: [address] [facility]       Admitting Physician: Yaneli Diaz DO  Attending Physician:  Nikhil Ortiz MD  Primary Car All other systems reviewed and negative except as noted above.     Physical Exam     ED Triage Vitals   BP 03/29/20 1612 (!) 183/71   Pulse 03/29/20 1612 64   Resp 03/29/20 1612 16   Temp 03/29/20 1612 98.2 °F (36.8 °C)   Temp src 03/29/20 1612 Temporal   S All other components within normal limits   CBC W/ DIFFERENTIAL - Abnormal; Notable for the following components:    WBC 15.7 (*)     RDW-SD 50.3 (*)     Neutrophil Absolute Prelim 13.44 (*)     Neutrophil Absolute 13.44 (*)     All other components withi 3/29/2020  5:53 pm    Present on Admission           ICD-10-CM Noted POA    Azotemia R79.89 3/29/2020 Yes    Closed left hip fracture, initial encounter [facility]) S72.002A 3/29/2020 Unknown    Hyperglycemia R73.9 3/29/2020 Yes    Hyponatremia E87.1 3/29/2020 Y Past Surgical History: History reviewed. No pertinent surgical history.     Family History: She denies history of heart disease, Mother  in 5 from Natural causes    Allergies: No Known Allergies    Medications:  No current facility-administered medic Estimated Creatinine Clearance: 28.1 mL/min (based on SCr of 0.87 mg/dL). Lab 03/29/20  1642   PTP 13.9   INR 1.04     Imaging: Imaging data reviewed in Epic. ASSESSMENT / PLAN:     1. Left Femoral neck fracture  1.  Ortho to evaluate for timing of surg 80year old female with acute closed traumatic displaced left hip femoral neck fracture. Discussed etiology as well as treatment options, with recommendation for surgical treatment as long as patient is medically fit to undergo surgery.  Risks and benefits Indications: Patient is a 63-year-old female who sustained the above fracture from a mechanical fall. We discussed treatment options with recommendation for surgical fixation. See initial consultation note for further details.   Patient elected to proceed After informed consent, the left lower extremity was marked. Patient was brought to the operating room where general plus regional block anesthesia was induced.   Patient was placed in lateral decubitus position with a peg board, with all bony prominences cartilaginous or labral defects. Next, the hip was flexed and internally rotated to expose the proximal femur. Soft tissue was removed from the medial aspect of the greater trochanter.   A box cutting osteotome was used to remove remaining lateral femoral pain cocktail. The posterior capsule and piriformis were repaired using nonabsorbable suture through drill holes in the posterior greater trochanter.   A deep drain was placed, and closure was performed with 2 Ethibond for fascia, 0 Vicryl for deep fat, 2- Indications of Use: HYPOCALCEMIA  Start: 03/31/20 0800      0925-Given   1326-Given   1700        ceFAZolin sodium (ANCEF/KEFZOL) 2 GM/20ML premix IV syringe 2 g   Dose: 2 g  Freq: Every 8 hours Route: IV  Start: 03/31/20 0000 End: 03/31/20 4621    Order s Last Dose: 20 mEq (03/30/20 1229)  Start: 03/30/20 1030 End: 03/30/20 1429     1229-New Bag             Povidone-Iodine 10 % 17.5 mL in sodium chloride 0.9 % 500 mL irrigation   Freq:  Once (Intra-Op) Route: IR  Start: 03/30/20 0919 End: 03/30/20 1934     1

## 2020-03-31 NOTE — PLAN OF CARE
Pt alert and oriented x4. Pt cleared for surgery today with Dr. Luis Campos. Pt received CHG baths x2. Denies left hip pain at rest. Able to turn onto bedpan to void. Pt transported to surgery.

## 2020-03-31 NOTE — PLAN OF CARE
V/S stable,drowsy but awakens easily. Encouraged to take sips of water,able to swallow without difficulty,IS reinforced. Lt.hip dressing dry/intact. ice pack in placed. Good and palpable pedal pulses,unable to dorsiflex yet on the left.foot. Denies pain at this

## 2020-03-31 NOTE — PHYSICAL THERAPY NOTE
PHYSICAL THERAPY EVALUATION - INPATIENT     Room Number: 7869/0244-T  Evaluation Date: 3/31/2020  Type of Evaluation: Initial  Physician Order: PT Eval and Treat    Presenting Problem: s/p left posterior cemented hemiarthroplasty 3/30/2020  Reason fo High fall risk    WEIGHT BEARING RESTRICTION  Weight Bearing Restriction: L lower extremity           L Lower Extremity: Weight Bearing as Tolerated    PAIN ASSESSMENT  Ratin  Location: denies pain at this time  Management Techniques:  Activity promotio another person does the patient currently need. ..   -   Moving to and from a bed to a chair (including a wheelchair)?: A Little   -   Need to walk in hospital room?: A Lot   -   Climbing 3-5 steps with a railing?: A Lot       AM-PAC Score:  Raw Score: 16 left cemented hemiarthroplasty 3/30/2020. Pertinent comorbidities and personal factors impacting therapy include advanced age, htn.   In this PT evaluation, the patient presents with the following impairments dec strength left LE, impaired balance, impaire

## 2020-03-31 NOTE — PLAN OF CARE
Patient c/o not being able swallow solid food at dinner time, had turkey sandwich and feels like it is getting stuck in throat, able to swallow pills whole. Speech eval ordered.

## 2020-03-31 NOTE — PROGRESS NOTES
[facility] Pharmacy Note:  Renal Dose Adjustment for Tramadol Karlynn Section)    Nelia Stuart has been prescribed Tramadol (ULTRAM) 50 mg orally every 6 hours as needed for pain. Estimated Creatinine Clearance: 27.8 mL/min (based on SCr of 0.88 mg/dL).     Her ca

## 2020-03-31 NOTE — DIETARY NOTE
[address] [facility] [address] [facility]    Pt does not meet malnutrition criteria at this time.      NUTRITION DIAGNOSIS/PROBLEM:    Underweight related to physiologic causes and possible inadequate energy intake as evidenced by BMI less than 23 for calories per kg)  Protein: 56-65 grams protein/day (1.3-1.5 grams protein per kg)  Fluid: ~1 ml/kcal or per MD discretion    MONITOR AND EVALUATE/NUTRITION GOALS:    1. PO intake to meet at least 75% patient nutrition prescription   2.  Pt to consume 1 of 2

## 2020-03-31 NOTE — OPERATIVE REPORT
Operative Note    Patient Name/: Chalino Bonner 1927  Date: 3/30/2020  Location: [facility]  Preoperative Diagnosis: Acute closed traumatic displaced left hip femoral neck fracture  Postoperative Diagnosis: Same  Operation: Posterior cemente internally rotated, the gluteus medius was identified, and a blunt cobra retractor was placed underneath.   Bere Dennison was used to identify the gluteus minimus next to the piriformis, which were  followed by placement of the blunt cobra underneath the and neck removed, and broach handle reattached. The size 16 standard stem was opened, and the canal was prepared for cementing. A cement restrictor was placed 1 cm distal to the tip of the stem.   The canal was then washed out, dried, and a sponge with ep

## 2020-03-31 NOTE — PLAN OF CARE
Patient up in chair with PT this morning, WBAT. Started on  mg BID for DVT prophylaxis. Left hip aquacel dressing dry and intact. B/P low this am, iv infusing, Dr. Ibeth Golden aware, will continue to monitor.

## 2020-03-31 NOTE — PROGRESS NOTES
[facility] HOSPITALIST  Progress Note     Nelia Stuart Patient Status:  Inpatient    1927 MRN [id number]   [facility] 2NE-A Attending Nakul Loomis MD    Hosp Day # 2 PCP Ancelmo Irene     Chief Complaint: mech fall  Left hip injur 1. Left Femoral neck fracture  1. Ortho   Following  3/30 s/p  posterior cemented l hip hemiarthroplasty      2. Analgesia, bowel regiment  3. PT/OT    rec [facility]  Pt refusing  Wants to go home   4. Follow Hgb   2. Mechanical Fall  3. HTN - BB   4.  HLD-  [facility]

## 2020-04-01 ENCOUNTER — MED REC SCAN ONLY (OUTPATIENT)
Dept: ORTHOPEDICS CLINIC | Facility: CLINIC | Age: 85
End: 2020-04-01

## 2020-04-01 VITALS
WEIGHT: 95 LBS | SYSTOLIC BLOOD PRESSURE: 130 MMHG | OXYGEN SATURATION: 99 % | HEART RATE: 85 BPM | BODY MASS INDEX: 15.83 KG/M2 | TEMPERATURE: 99 F | RESPIRATION RATE: 16 BRPM | DIASTOLIC BLOOD PRESSURE: 56 MMHG | HEIGHT: 65 IN

## 2020-04-01 PROCEDURE — 99239 HOSP IP/OBS DSCHRG MGMT >30: CPT | Performed by: HOSPITALIST

## 2020-04-01 RX ORDER — CALCIUM CARBONATE/VITAMIN D3 250-3.125
2 TABLET ORAL
Qty: 100 TABLET | Refills: 0 | Status: SHIPPED | OUTPATIENT
Start: 2020-04-01

## 2020-04-01 RX ORDER — ASPIRIN 81 MG/1
81 TABLET ORAL 2 TIMES DAILY
Status: DISCONTINUED | OUTPATIENT
Start: 2020-04-01 | End: 2020-04-01

## 2020-04-01 RX ORDER — ACETAMINOPHEN 500 MG
1000 TABLET ORAL EVERY 8 HOURS
Qty: 100 TABLET | Refills: 0 | Status: SHIPPED | OUTPATIENT
Start: 2020-04-01 | End: 2020-07-22 | Stop reason: ALTCHOICE

## 2020-04-01 RX ORDER — ASPIRIN 81 MG/1
81 TABLET ORAL 2 TIMES DAILY
Qty: 84 TABLET | Refills: 0 | Status: SHIPPED | OUTPATIENT
Start: 2020-04-01 | End: 2020-05-13

## 2020-04-01 RX ORDER — PSEUDOEPHEDRINE HCL 30 MG
100 TABLET ORAL 2 TIMES DAILY PRN
Qty: 60 CAPSULE | Refills: 0 | Status: SHIPPED | OUTPATIENT
Start: 2020-04-01

## 2020-04-01 NOTE — CM/SW NOTE
Spoke to Pt's RN this morning, and RN stated that pt is adamant on not going to [facility]. RN plans on speaking to pt's daughter in regards to discharge plans. SW requested to have RN follow up w/ SW to discuss plans and potential need for Pasha Pereira.  Will continue to

## 2020-04-01 NOTE — PHYSICAL THERAPY NOTE
PHYSICAL THERAPY TREATMENT NOTE - INPATIENT    Room Number: 2640/3957-U     Session: 1   Number of Visits to Meet Established Goals: 5    Presenting Problem: s/p left posterior cemented hemiarthroplasty 3/30/2020  History related to current admission: Pt Turning over in bed (including adjusting bedclothes, sheets and blankets)?: A Little   -   Sitting down on and standing up from a chair with arms (e.g., wheelchair, bedside commode, etc.): A Little   -   Moving from lying on back to sitting on the side of what we will do, if she wants to come home she can come home\". Discussed pt's current high fall risk, pt's spouse stated \"we always have 911, I want her to be happy and if coming home makes her happy that's what we will do\".     Discussed with pt and sp Treatment Plan: Bed mobility; Endurance; Energy conservation;Patient education;Gait training;Range of motion;Strengthening;Transfer training  Rehab Potential : Good  Frequency (Obs): Daily    CURRENT GOALS      Goal #1 Patient is able to demonstrate supine -

## 2020-04-01 NOTE — PLAN OF CARE
Pt up with Pt/OT this am, plan for home with [place] today. RA, VSS. BP stable. ASA pt refusing, need to have ortho switch this for DVT prophylaxis. WBAT. Min assist with walker. Will continue to monitor.

## 2020-04-01 NOTE — PLAN OF CARE
PT AOX4 this PM. VSS on RA. Aquacel to L hip CDI, warm dry extremities, palpable pulses, moderate strength in legs. Up and ambulating to bathroom with min assist gait belt and walker. Voiding freely.  Drinking ensures for nutritional supplement, speech to s

## 2020-04-01 NOTE — PROGRESS NOTES
[facility] HOSPITALIST  Progress Note     Erika Rodriguez Patient Status:  Inpatient    1927 MRN [id number]   [facility] 2NE-A Attending Natalie Schmidt MD    Hosp Day # 3 PCP Michael Garcia     Chief Complaint: mech fall  Left hip inju input(s): DAVID GONZALEZ in the last 168 hours. Imaging: Imaging data reviewed in Epic. ASSESSMENT / PLAN:   1. Left Femoral neck fracture  1. Ortho   Following  3/30 s/p  posterior cemented l hip hemiarthroplasty      2. Analgesia, bowel regiment  3.  PT/OT

## 2020-04-01 NOTE — SLP NOTE
ADULT SWALLOWING EVALUATION    ASSESSMENT    ASSESSMENT/OVERALL IMPRESSION:  Order received for bedside swallow evaluation to r/o aspiration.  Pt is a 79y/o year old female who presented s/p fall with hip pain; dx with complete impacted L femoral neck frac stated \"I don't need to do any of those things now honey, I'm just fine. \" Reiterated importance of aspiration precautions to patient given her age and situation with good understanding reported.  Will continue to follow to assess tolerance with diet recom Motion: Within Functional Limits    Voice Quality: Clear  Respiratory Status: Unlabored  Consistencies Trialed: Thin liquids;Puree;Hard solid  Method of Presentation: Self presentation;Spoon;Cup;Straw;Single sips; Consecutive swallows  Patient Positioning:

## 2020-04-01 NOTE — OCCUPATIONAL THERAPY NOTE
OCCUPATIONAL THERAPY EVALUATION - INPATIENT     Room Number: 5061/5500-J  Evaluation Date: 3/31/2020  Type of Evaluation: Initial  Presenting Problem: L hip fx s/p L hip cemented hemiarthroplasty on 3/30     Physician Order: IP Consult to Occupational Ther SUBJECTIVE   \"I did ballet. \"     Patient self-stated goal is to go home      OBJECTIVE  Precautions: Hip abduction pillow;SKYLAR - posterior;Bed/chair alarm  Fall Risk: High fall risk    WEIGHT BEARING RESTRICTION  Weight Bearing Restriction: L lower ex and min assistance with mod verbal/visual/tactile cues for safety with RW use and hand placement. Pt performed functional mobility with min assistance and RW x approx 50ft>bathroom.  Pt performed a toilet T/F with min assist and toileting including ji-car or therapy record   Specific performance deficits impacting engagement in ADL/IADL MODERATE  3 - 5 performance deficits   Client Assessment/Performance Deficits MODERATE - Comorbidities and min to mod modifications of tasks    Clinical Decision Making MODE

## 2020-04-01 NOTE — PAYOR COMM NOTE
--------------  CONTINUED STAY REVIEW    Payor: [employer]  Subscriber #:  [id number]  Authorization Number: PENDING    Admit date: 3/29/20  Admit time: 1941    Admitting Physician: Zita Borrero DO  Attending Physician:  Ksenia Briggs MD  Primary Ca Resp:  [10-19] 18  BP: ()/() 111/94    Lab 03/29/20  1642 03/30/20  0718 03/31/20  0625   WBC 15.7* 10.4 11.2*   HGB 14.3 12.0 10.4*   MCV 97.2 95.7 97.1   .0 206.0 189.0   INR 1.04  --   --      Lab 03/29/20  1642 03/29/20  1742 03/30/2 Order received for bedside swallow evaluation to r/o aspiration. Pt is a 81y/o year old female who presented s/p fall with hip pain; dx with complete impacted L femoral neck fracture; s/p L posterior cemented hemiarthroplasty.  Pmhx includes CAD w/stent pl Dicussed results of exam, diet recommendations, compensatory strategies and plan with pt and RN who was present in room. Pt reported good understanding and stated \"I don't need to do any of those things now honey, I'm just fine. \" Reiterated importance of Freq: Every 8 hours Route: IV  Start: 03/31/20 0000 End: 03/31/20 1642    Order specific questions:   What infection is this being used to treat?  Surgical prophylaxis  What is the anticipated duration of therapy? <48 hours    0044-Given   0924-Given

## 2020-04-01 NOTE — CM/SW NOTE
Spoke to [place] at [facility]. She stated that the workman's comp case is still pending, and the pt did not indicate that she wished to pursue outside treatment when the incident occurred.  Sterling stated that it may take a couple days before danny

## 2020-04-01 NOTE — PLAN OF CARE
Dr. Emil Drake called during his current surgery, does not need to see pt prior to d/c and ok to d/c home from ortho standpoint, will f/up in 2 weeks.

## 2020-04-03 ENCOUNTER — TELEPHONE (OUTPATIENT)
Dept: CARDIOLOGY | Age: 85
End: 2020-04-03

## 2020-04-03 NOTE — DISCHARGE SUMMARY
[facility] HOSPITALIST  DISCHARGE SUMMARY     Bennye Denver Patient Status:  Inpatient    1927 MRN [id number]   [facility] 2NE-A Attending No att. providers found   Hosp Day # 3 PCP Mindy Hernandez     Date of Admission: 3/29/2020  Date Will be on twice daily aspirin for DVT prophylaxis. Patient refused to have us contact her daughter would not share phone number not in epic anywhere.   Patient discharged home with instructions for follow-up with orthopedics and her PCP vitals have been constipation. Quantity:  60 capsule  Refills:  0     Sennosides 17.2 MG Tabs      Take 1 tablet (17.2 mg total) by mouth nightly.    Quantity:  30 tablet  Refills:  0        CONTINUE taking these medications      Instructions Prescription details   celeco bowel sounds. Musculoskeletal: Moves all extremities. Extremities: No edema.   Left hip drsg d/i  No edema   -----------------------------------------------------------------------------------------------  PATIENT DISCHARGE INSTRUCTIONS: See electronic

## 2020-04-09 NOTE — PAYOR COMM NOTE
--------------  DISCHARGE REVIEW    Payor: [employer]  Subscriber #:  [id number]  Authorization Number: PENDING    Admit date: 3/29/20  Admit time:  1941  Discharge Date: 4/1/2020  6:02 PM     Admitting Physician: Zain Aceves DO  Attending Physician takes a baby aspirin daily. She lives with her  in [place].     She presently complains of pain in her left hip only with movement.   She states she is comfortable at rest.  She was found to have a complete impacted left femoral neck fracture for List:  START taking these medications      Instructions Prescription details   acetaminophen 500 MG Tabs  Commonly known as:  TYLENOL EXTRA STRENGTH  Notes to patient:  TAKE AS NEEDED FOR PAIN      Take 2 tablets (1,000 mg total) by mouth every 8 (eight) h 71388  [phone]    Schedule an appointment as soon as possible for a visit in 1 week  Call to discuss follow up appt, possible video visit    Mago Patel, [address]  [phone]    Go on 4/10/2020  For postop

## 2020-04-10 ENCOUNTER — VIRTUAL PHONE E/M (OUTPATIENT)
Dept: ORTHOPEDICS CLINIC | Facility: CLINIC | Age: 85
End: 2020-04-10
Payer: MEDICARE

## 2020-04-10 DIAGNOSIS — S72.002D CLOSED LEFT HIP FRACTURE, WITH ROUTINE HEALING, SUBSEQUENT ENCOUNTER: Primary | ICD-10-CM

## 2020-04-10 PROCEDURE — 1111F DSCHRG MED/CURRENT MED MERGE: CPT | Performed by: ORTHOPAEDIC SURGERY

## 2020-04-10 NOTE — PROGRESS NOTES
Attempted to call patient today x 2 for postop check in - no answer either time, no message left as voice mailbox does not confirm name or number

## 2020-04-13 ENCOUNTER — TELEPHONE (OUTPATIENT)
Dept: ORTHOPEDICS CLINIC | Facility: CLINIC | Age: 85
End: 2020-04-13

## 2020-04-13 NOTE — TELEPHONE ENCOUNTER
Called patient's daughter per request at [phone]  No issues, not having pain, using walker to get around, incision healing well, taking aspirin BID.   Continue aspirin BID, no pain med refills needed, continue posterior hip precautions and WBAT with wa

## 2020-05-01 ENCOUNTER — MED REC SCAN ONLY (OUTPATIENT)
Dept: ORTHOPEDICS CLINIC | Facility: CLINIC | Age: 85
End: 2020-05-01

## 2020-05-08 ENCOUNTER — TELEPHONE (OUTPATIENT)
Dept: ORTHOPEDICS CLINIC | Facility: CLINIC | Age: 85
End: 2020-05-08

## 2020-05-08 NOTE — TELEPHONE ENCOUNTER
Called patient to discuss her hip. She states she is having no pain in the hip. She states the incision is well-healed. She reports that she is not ambulating with any sort of assist device.   She reports that she is going up and down stairs without diff

## 2020-05-11 ENCOUNTER — TELEPHONE (OUTPATIENT)
Dept: ORTHOPEDICS CLINIC | Facility: CLINIC | Age: 85
End: 2020-05-11

## 2020-05-11 DIAGNOSIS — S72.002D CLOSED LEFT HIP FRACTURE, WITH ROUTINE HEALING, SUBSEQUENT ENCOUNTER: Primary | ICD-10-CM

## 2020-05-11 NOTE — TELEPHONE ENCOUNTER
Called patient to discuss hip on Friday - attempted to call CM/PT but no answer and no return call (left VM).   Discussed with daughter today to confirm patient's progress - states therapy has discontinued assist device, she is doing well with strength and

## 2020-05-27 ENCOUNTER — TELEPHONE (OUTPATIENT)
Dept: ORTHOPEDICS CLINIC | Facility: CLINIC | Age: 85
End: 2020-05-27

## 2020-06-01 ENCOUNTER — MED REC SCAN ONLY (OUTPATIENT)
Dept: ORTHOPEDICS CLINIC | Facility: CLINIC | Age: 85
End: 2020-06-01

## 2020-06-30 ENCOUNTER — HOSPITAL ENCOUNTER (OUTPATIENT)
Dept: GENERAL RADIOLOGY | Age: 85
Discharge: HOME | End: 2020-06-30
Attending: ORTHOPAEDIC SURGERY
Payer: OTHER MISCELLANEOUS

## 2020-06-30 DIAGNOSIS — S72.002D CLOSED LEFT HIP FRACTURE, WITH ROUTINE HEALING, SUBSEQUENT ENCOUNTER: ICD-10-CM

## 2020-06-30 PROCEDURE — 73502 X-RAY EXAM HIP UNI 2-3 VIEWS: CPT | Performed by: ORTHOPAEDIC SURGERY

## 2020-07-10 ENCOUNTER — TELEPHONE (OUTPATIENT)
Dept: ORTHOPEDICS CLINIC | Facility: CLINIC | Age: 85
End: 2020-07-10

## 2020-07-10 NOTE — TELEPHONE ENCOUNTER
Called patient about hip - she is not yet back to work. Walks without assist device at home, but uses a walker if she goes outside the house since sidewalk is uneven.  She says she has had pain along the outside of her thigh/hip in one spot - hurts when she

## 2020-07-13 NOTE — TELEPHONE ENCOUNTER
Future Appointments   Date Time Provider Srikanth Vallejo   7/20/2020  4:30 PM Bravo Dyson MD [facility]

## 2020-07-16 ENCOUNTER — TELEPHONE (OUTPATIENT)
Dept: CARDIOLOGY | Age: 85
End: 2020-07-16

## 2020-07-18 LAB
ALBUMIN SERPL-MCNC: 4 G/DL (ref 3.6–5.1)
ALBUMIN/GLOB SERPL: 1.7 (CALC) (ref 1–2.5)
ALP SERPL-CCNC: 132 U/L (ref 37–153)
ALT SERPL-CCNC: 14 U/L (ref 6–29)
AST SERPL-CCNC: 26 U/L (ref 10–35)
BILIRUB SERPL-MCNC: 0.5 MG/DL (ref 0.2–1.2)
BUN SERPL-MCNC: 24 MG/DL (ref 7–25)
BUN/CREAT SERPL: 24 (CALC) (ref 6–22)
CALCIUM SERPL-MCNC: 9.6 MG/DL (ref 8.6–10.4)
CHLORIDE SERPL-SCNC: 106 MMOL/L (ref 98–110)
CHOLEST SERPL-MCNC: 144 MG/DL
CHOLEST/HDLC SERPL: 2.1 (CALC)
CO2 SERPL-SCNC: 27 MMOL/L (ref 20–32)
CREAT SERPL-MCNC: 1.01 MG/DL (ref 0.6–0.88)
GFRSERPLBLD MDRD-ARVRAT: 48 ML/MIN/1.73M2
GLOBULIN SER CALC-MCNC: 2.4 G/DL (CALC) (ref 1.9–3.7)
GLUCOSE SERPL-MCNC: 101 MG/DL (ref 65–99)
HDLC SERPL-MCNC: 70 MG/DL
LDLC SERPL CALC-MCNC: 59 MG/DL (CALC)
NONHDLC SERPL-MCNC: 74 MG/DL (CALC)
POTASSIUM SERPL-SCNC: 3.9 MMOL/L (ref 3.5–5.3)
PROT SERPL-MCNC: 6.4 G/DL (ref 6.1–8.1)
SODIUM SERPL-SCNC: 141 MMOL/L (ref 135–146)
TRIGL SERPL-MCNC: 70 MG/DL

## 2020-07-22 ENCOUNTER — OFFICE VISIT (OUTPATIENT)
Dept: ORTHOPEDICS CLINIC | Facility: CLINIC | Age: 85
End: 2020-07-22
Payer: MEDICARE

## 2020-07-22 VITALS — HEIGHT: 65 IN | OXYGEN SATURATION: 98 % | RESPIRATION RATE: 16 BRPM | HEART RATE: 56 BPM | BODY MASS INDEX: 16 KG/M2

## 2020-07-22 DIAGNOSIS — S72.002D CLOSED LEFT HIP FRACTURE, WITH ROUTINE HEALING, SUBSEQUENT ENCOUNTER: Primary | ICD-10-CM

## 2020-07-22 PROCEDURE — 1111F DSCHRG MED/CURRENT MED MERGE: CPT | Performed by: ORTHOPAEDIC SURGERY

## 2020-07-22 PROCEDURE — 20610 DRAIN/INJ JOINT/BURSA W/O US: CPT | Performed by: ORTHOPAEDIC SURGERY

## 2020-07-22 PROCEDURE — 99213 OFFICE O/P EST LOW 20 MIN: CPT | Performed by: ORTHOPAEDIC SURGERY

## 2020-07-22 RX ORDER — TRIAMCINOLONE ACETONIDE 40 MG/ML
40 INJECTION, SUSPENSION INTRA-ARTICULAR; INTRAMUSCULAR ONCE
Status: COMPLETED | OUTPATIENT
Start: 2020-07-22 | End: 2020-07-22

## 2020-07-22 RX ADMIN — TRIAMCINOLONE ACETONIDE 40 MG: 40 INJECTION, SUSPENSION INTRA-ARTICULAR; INTRAMUSCULAR at 11:15:00

## 2020-07-22 NOTE — PROGRESS NOTES
[facility] Progress Note    Subjective: Patient returns to clinic approximately 4 months postop from cemented left hip posterior hemiarthroplasty for displaced femoral neck fracture.   She reports that she has had some pain in the left hip, points to possible injection to the trochanteric bursa to help alleviate symptoms, but also informed her that this will not permanently treat the issue and that I would recommend to focus on continued stretching and strengthening exercises as well.   She does have co

## 2020-07-22 NOTE — PROCEDURES
After informed consent, the patient's left proximal lateral thigh over the greater trochanter was marked, locally anesthetized with skin refrigerant, prepped with topical antiseptic, and injected with a mixture of 1mL 40mg/mL Kenalog, 2mL 1% lidocaine and

## 2020-07-30 ENCOUNTER — OFFICE VISIT (OUTPATIENT)
Dept: CARDIOLOGY | Age: 85
End: 2020-07-30

## 2020-07-30 VITALS
SYSTOLIC BLOOD PRESSURE: 130 MMHG | DIASTOLIC BLOOD PRESSURE: 60 MMHG | WEIGHT: 94 LBS | HEIGHT: 62 IN | BODY MASS INDEX: 17.3 KG/M2 | HEART RATE: 64 BPM

## 2020-07-30 DIAGNOSIS — Z95.5 HISTORY OF HEART ARTERY STENT: ICD-10-CM

## 2020-07-30 DIAGNOSIS — I65.23 ASYMPTOMATIC CAROTID ARTERY STENOSIS, BILATERAL: ICD-10-CM

## 2020-07-30 DIAGNOSIS — I25.10 CAD IN NATIVE ARTERY: Primary | ICD-10-CM

## 2020-07-30 DIAGNOSIS — E78.00 PURE HYPERCHOLESTEROLEMIA: ICD-10-CM

## 2020-07-30 PROCEDURE — 99214 OFFICE O/P EST MOD 30 MIN: CPT | Performed by: INTERNAL MEDICINE

## 2020-07-30 RX ORDER — CELECOXIB 200 MG/1
200 CAPSULE ORAL DAILY
COMMUNITY

## 2020-07-30 ASSESSMENT — PATIENT HEALTH QUESTIONNAIRE - PHQ9
1. LITTLE INTEREST OR PLEASURE IN DOING THINGS: NOT AT ALL
2. FEELING DOWN, DEPRESSED OR HOPELESS: NOT AT ALL
SUM OF ALL RESPONSES TO PHQ9 QUESTIONS 1 AND 2: 0
2. FEELING DOWN, DEPRESSED OR HOPELESS: NOT AT ALL
CLINICAL INTERPRETATION OF PHQ2 SCORE: NO FURTHER SCREENING NEEDED
SUM OF ALL RESPONSES TO PHQ9 QUESTIONS 1 AND 2: 0
1. LITTLE INTEREST OR PLEASURE IN DOING THINGS: NOT AT ALL
CLINICAL INTERPRETATION OF PHQ2 SCORE: NO FURTHER SCREENING NEEDED
CLINICAL INTERPRETATION OF PHQ9 SCORE: NO FURTHER SCREENING NEEDED
SUM OF ALL RESPONSES TO PHQ9 QUESTIONS 1 AND 2: 0

## 2020-08-25 ENCOUNTER — TELEPHONE (OUTPATIENT)
Dept: ORTHOPEDICS CLINIC | Facility: CLINIC | Age: 85
End: 2020-08-25

## 2020-08-25 NOTE — TELEPHONE ENCOUNTER
LMOM letting patient know that parking placard is partially filled out. We need patient to come into office to sign form. No copies made or sent to scan until we get signature.

## 2020-09-21 ENCOUNTER — OFFICE VISIT (OUTPATIENT)
Dept: ORTHOPEDICS CLINIC | Facility: CLINIC | Age: 85
End: 2020-09-21
Payer: OTHER MISCELLANEOUS

## 2020-09-21 VITALS — HEART RATE: 56 BPM | OXYGEN SATURATION: 99 %

## 2020-09-21 DIAGNOSIS — Z96.642 S/P HIP REPLACEMENT, LEFT: ICD-10-CM

## 2020-09-21 DIAGNOSIS — M25.552 LEFT HIP PAIN: Primary | ICD-10-CM

## 2020-09-21 PROCEDURE — 99212 OFFICE O/P EST SF 10 MIN: CPT | Performed by: ORTHOPAEDIC SURGERY

## 2020-09-21 NOTE — PROGRESS NOTES
[facility] Progress Note    Subjective: Patient is approximately 6 months postop from left hip cemented hemiarthroplasty for displaced femoral neck fracture.   She states that she ambulates at home without an assist device, and brings a cane with her

## 2020-10-05 ENCOUNTER — TELEPHONE (OUTPATIENT)
Dept: MAMMOGRAPHY | Facility: HOSPITAL | Age: 85
End: 2020-10-05

## 2020-10-05 ENCOUNTER — HOSPITAL ENCOUNTER (OUTPATIENT)
Dept: MAMMOGRAPHY | Age: 85
Discharge: HOME | End: 2020-10-05
Attending: FAMILY MEDICINE
Payer: MEDICARE

## 2020-10-05 ENCOUNTER — HOSPITAL ENCOUNTER (OUTPATIENT)
Dept: ULTRASOUND IMAGING | Age: 85
Discharge: HOME | End: 2020-10-05
Attending: FAMILY MEDICINE
Payer: MEDICARE

## 2020-10-05 DIAGNOSIS — N63.10 UNSPECIFIED LUMP IN THE RIGHT BREAST, UNSPECIFIED QUADRANT: ICD-10-CM

## 2020-10-05 PROCEDURE — 77066 DX MAMMO INCL CAD BI: CPT | Performed by: FAMILY MEDICINE

## 2020-10-05 PROCEDURE — 76641 ULTRASOUND BREAST COMPLETE: CPT | Performed by: FAMILY MEDICINE

## 2020-10-05 PROCEDURE — 77062 BREAST TOMOSYNTHESIS BI: CPT | Performed by: FAMILY MEDICINE

## 2020-10-05 PROCEDURE — 77061 BREAST TOMOSYNTHESIS UNI: CPT | Performed by: FAMILY MEDICINE

## 2020-10-05 PROCEDURE — 77065 DX MAMMO INCL CAD UNI: CPT | Performed by: FAMILY MEDICINE

## 2020-10-05 NOTE — TELEPHONE ENCOUNTER
I LVM for pt to call back [facility] RN, attempting to screen/educate pt re: breast biopsy recommendation.

## 2020-10-07 ENCOUNTER — TELEPHONE (OUTPATIENT)
Dept: ORTHOPEDICS CLINIC | Facility: CLINIC | Age: 85
End: 2020-10-07

## 2020-10-07 ENCOUNTER — TELEPHONE (OUTPATIENT)
Dept: MAMMOGRAPHY | Facility: HOSPITAL | Age: 85
End: 2020-10-07

## 2020-10-07 NOTE — TELEPHONE ENCOUNTER
I LVM #2 for pt to call back [facility] RN, attempting to screen/educate pt re: breast biopsy recommendation.

## 2020-10-07 NOTE — TELEPHONE ENCOUNTER
Patient is asking that a return to work note be faxed to [phone] attention Mirela Stone. Note needs to state any limitations patient may have.

## 2020-10-09 ENCOUNTER — TELEPHONE (OUTPATIENT)
Dept: MAMMOGRAPHY | Facility: HOSPITAL | Age: 85
End: 2020-10-09

## 2020-10-09 NOTE — TELEPHONE ENCOUNTER
Attempt #3 to call patient re: recommended breast biopsy instructions/ screening. Message left for patient to call back at the number provided.

## 2020-10-09 NOTE — IMAGING NOTE
Received a call from Ainsley Chau from [facility]. Patient had called her stating that she was told \"everything was fine\" when she left from her mammogram /US appt.  She states that we have been calling her about a biopsy and that she

## 2020-10-12 ENCOUNTER — TELEPHONE (OUTPATIENT)
Dept: MAMMOGRAPHY | Facility: HOSPITAL | Age: 85
End: 2020-10-12

## 2020-10-12 NOTE — TELEPHONE ENCOUNTER
Spoke with provider who states he does want the pt to proceed with the breast biopsy but that she is very fearful and nervous to have it done. He states he encouraged her on Friday to call us to make the appointment.  I explained I will reach out to her and

## 2020-10-19 ENCOUNTER — TELEPHONE (OUTPATIENT)
Dept: MAMMOGRAPHY | Facility: HOSPITAL | Age: 85
End: 2020-10-19

## 2020-10-20 ENCOUNTER — TELEPHONE (OUTPATIENT)
Dept: CT IMAGING | Facility: HOSPITAL | Age: 85
End: 2020-10-20

## 2020-10-20 NOTE — TELEPHONE ENCOUNTER
This [facility] RN phoned pt re right breast biopsy recommendation by Dr. Rula Youngblood for mass. Pt was given reason for call via voicemail but then picked up the call.  I attempted to repeat my reason for calling and pt stated \"I don't want anything and I'm

## 2021-02-24 RX ORDER — TRIAMCINOLONE ACETONIDE EXTENDED-RELEASE INJECTABLE SUSPENSION 32 MG
KIT INTRA-ARTICULAR
COMMUNITY
End: 2021-02-25

## 2021-02-25 ENCOUNTER — OFFICE VISIT (OUTPATIENT)
Dept: CARDIOLOGY | Age: 86
End: 2021-02-25

## 2021-02-25 VITALS
DIASTOLIC BLOOD PRESSURE: 50 MMHG | SYSTOLIC BLOOD PRESSURE: 124 MMHG | BODY MASS INDEX: 17.19 KG/M2 | WEIGHT: 97 LBS | HEART RATE: 60 BPM | HEIGHT: 63 IN

## 2021-02-25 DIAGNOSIS — Z95.5 HISTORY OF HEART ARTERY STENT: Primary | ICD-10-CM

## 2021-02-25 DIAGNOSIS — I25.10 CAD IN NATIVE ARTERY: ICD-10-CM

## 2021-02-25 DIAGNOSIS — E78.00 PURE HYPERCHOLESTEROLEMIA: ICD-10-CM

## 2021-02-25 DIAGNOSIS — I65.23 ASYMPTOMATIC CAROTID ARTERY STENOSIS, BILATERAL: ICD-10-CM

## 2021-02-25 PROCEDURE — 99214 OFFICE O/P EST MOD 30 MIN: CPT | Performed by: INTERNAL MEDICINE

## 2021-02-25 SDOH — HEALTH STABILITY: PHYSICAL HEALTH: ON AVERAGE, HOW MANY MINUTES DO YOU ENGAGE IN EXERCISE AT THIS LEVEL?: 0 MIN

## 2021-02-25 SDOH — HEALTH STABILITY: PHYSICAL HEALTH: ON AVERAGE, HOW MANY DAYS PER WEEK DO YOU ENGAGE IN MODERATE TO STRENUOUS EXERCISE (LIKE A BRISK WALK)?: 0 DAYS

## 2021-02-25 ASSESSMENT — PATIENT HEALTH QUESTIONNAIRE - PHQ9
CLINICAL INTERPRETATION OF PHQ2 SCORE: NO FURTHER SCREENING NEEDED
1. LITTLE INTEREST OR PLEASURE IN DOING THINGS: NOT AT ALL
2. FEELING DOWN, DEPRESSED OR HOPELESS: NOT AT ALL
SUM OF ALL RESPONSES TO PHQ9 QUESTIONS 1 AND 2: 0
SUM OF ALL RESPONSES TO PHQ9 QUESTIONS 1 AND 2: 0
CLINICAL INTERPRETATION OF PHQ9 SCORE: NO FURTHER SCREENING NEEDED

## 2021-07-13 ENCOUNTER — TELEPHONE (OUTPATIENT)
Dept: CARDIOLOGY | Age: 86
End: 2021-07-13

## 2023-09-05 ENCOUNTER — LAB ENCOUNTER (OUTPATIENT)
Dept: LAB | Age: 88
End: 2023-09-05
Attending: INTERNAL MEDICINE
Payer: MEDICARE

## 2023-09-05 DIAGNOSIS — I25.10 CORONARY ATHEROSCLEROSIS OF NATIVE CORONARY ARTERY: ICD-10-CM

## 2023-09-05 DIAGNOSIS — D72.829 LEUKOCYTOSIS (LEUCOCYTOSIS): ICD-10-CM

## 2023-09-05 DIAGNOSIS — E78.5 HYPERLIPEMIA: ICD-10-CM

## 2023-09-05 DIAGNOSIS — E87.1 HYPOSMOLALITY SYNDROME: ICD-10-CM

## 2023-09-05 DIAGNOSIS — I65.23 BILATERAL CAROTID ARTERY STENOSIS: ICD-10-CM

## 2023-09-05 DIAGNOSIS — R73.9 BLOOD GLUCOSE ELEVATED: ICD-10-CM

## 2023-09-05 DIAGNOSIS — I10 ESSENTIAL HYPERTENSION, MALIGNANT: Primary | ICD-10-CM

## 2023-09-05 LAB
ALBUMIN SERPL-MCNC: 3.6 G/DL (ref 3.4–5)
ALBUMIN/GLOB SERPL: 1 {RATIO} (ref 1–2)
ALP LIVER SERPL-CCNC: 114 U/L
ALT SERPL-CCNC: 26 U/L
ANION GAP SERPL CALC-SCNC: 2 MMOL/L (ref 0–18)
AST SERPL-CCNC: 31 U/L (ref 15–37)
BILIRUB SERPL-MCNC: 0.6 MG/DL (ref 0.1–2)
BUN BLD-MCNC: 18 MG/DL (ref 7–18)
CALCIUM BLD-MCNC: 9.4 MG/DL (ref 8.5–10.1)
CHLORIDE SERPL-SCNC: 106 MMOL/L (ref 98–112)
CHOLEST SERPL-MCNC: 146 MG/DL (ref ?–200)
CO2 SERPL-SCNC: 33 MMOL/L (ref 21–32)
CREAT BLD-MCNC: 0.97 MG/DL
EGFRCR SERPLBLD CKD-EPI 2021: 53 ML/MIN/1.73M2 (ref 60–?)
FASTING PATIENT LIPID ANSWER: YES
FASTING STATUS PATIENT QL REPORTED: YES
GLOBULIN PLAS-MCNC: 3.6 G/DL (ref 2.8–4.4)
GLUCOSE BLD-MCNC: 109 MG/DL (ref 70–99)
HDLC SERPL-MCNC: 82 MG/DL (ref 40–59)
LDLC SERPL CALC-MCNC: 49 MG/DL (ref ?–100)
NONHDLC SERPL-MCNC: 64 MG/DL (ref ?–130)
OSMOLALITY SERPL CALC.SUM OF ELEC: 294 MOSM/KG (ref 275–295)
POTASSIUM SERPL-SCNC: 3.6 MMOL/L (ref 3.5–5.1)
PROT SERPL-MCNC: 7.2 G/DL (ref 6.4–8.2)
SODIUM SERPL-SCNC: 141 MMOL/L (ref 136–145)
TRIGL SERPL-MCNC: 82 MG/DL (ref 30–149)
VLDLC SERPL CALC-MCNC: 12 MG/DL (ref 0–30)

## 2023-09-05 PROCEDURE — 80061 LIPID PANEL: CPT

## 2023-09-05 PROCEDURE — 36415 COLL VENOUS BLD VENIPUNCTURE: CPT

## 2023-09-05 PROCEDURE — 80053 COMPREHEN METABOLIC PANEL: CPT

## 2023-12-21 ENCOUNTER — HOSPITAL ENCOUNTER (INPATIENT)
Facility: HOSPITAL | Age: 88
LOS: 2 days | Discharge: HOME HEALTH | DRG: 177 | End: 2023-12-23
Attending: EMERGENCY MEDICINE | Admitting: INTERNAL MEDICINE
Payer: MEDICARE

## 2023-12-21 ENCOUNTER — APPOINTMENT (OUTPATIENT)
Dept: GENERAL RADIOLOGY | Facility: HOSPITAL | Age: 88
DRG: 177 | End: 2023-12-21
Payer: MEDICARE

## 2023-12-21 ENCOUNTER — APPOINTMENT (OUTPATIENT)
Dept: GENERAL RADIOLOGY | Facility: HOSPITAL | Age: 88
End: 2023-12-21
Payer: MEDICARE

## 2023-12-21 ENCOUNTER — HOSPITAL ENCOUNTER (INPATIENT)
Facility: HOSPITAL | Age: 88
LOS: 2 days | Discharge: HOME | End: 2023-12-23
Attending: EMERGENCY MEDICINE | Admitting: INTERNAL MEDICINE
Payer: MEDICARE

## 2023-12-21 DIAGNOSIS — R79.89 ELEVATED TROPONIN: ICD-10-CM

## 2023-12-21 DIAGNOSIS — I49.3 PVC (PREMATURE VENTRICULAR CONTRACTION): ICD-10-CM

## 2023-12-21 DIAGNOSIS — U07.1 COVID-19: ICD-10-CM

## 2023-12-21 DIAGNOSIS — I44.7 LEFT BUNDLE BRANCH BLOCK: Primary | ICD-10-CM

## 2023-12-21 DIAGNOSIS — R73.9 HYPERGLYCEMIA: ICD-10-CM

## 2023-12-21 DIAGNOSIS — R53.1 WEAKNESS: ICD-10-CM

## 2023-12-21 DIAGNOSIS — R00.1 BRADYCARDIA: ICD-10-CM

## 2023-12-21 PROBLEM — D69.6 THROMBOCYTOPENIA (HCC): Status: ACTIVE | Noted: 2023-12-21

## 2023-12-21 LAB
ALBUMIN SERPL-MCNC: 3.3 G/DL (ref 3.4–5)
ALBUMIN/GLOB SERPL: 1.3 {RATIO} (ref 1–2)
ALP LIVER SERPL-CCNC: 97 U/L
ALT SERPL-CCNC: 28 U/L
ANION GAP SERPL CALC-SCNC: 4 MMOL/L (ref 0–18)
AST SERPL-CCNC: 47 U/L (ref 15–37)
ATRIAL RATE: 55 BPM
BASOPHILS # BLD AUTO: 0.01 X10(3) UL (ref 0–0.2)
BASOPHILS NFR BLD AUTO: 0.2 %
BILIRUB SERPL-MCNC: 0.6 MG/DL (ref 0.1–2)
BILIRUB UR QL STRIP.AUTO: NEGATIVE
BUN BLD-MCNC: 20 MG/DL (ref 9–23)
CALCIUM BLD-MCNC: 8.5 MG/DL (ref 8.5–10.1)
CHLORIDE SERPL-SCNC: 108 MMOL/L (ref 98–112)
CHOLEST SERPL-MCNC: 129 MG/DL (ref ?–200)
CO2 SERPL-SCNC: 27 MMOL/L (ref 21–32)
COLOR UR AUTO: YELLOW
CREAT BLD-MCNC: 1.03 MG/DL
EGFRCR SERPLBLD CKD-EPI 2021: 50 ML/MIN/1.73M2 (ref 60–?)
EOSINOPHIL # BLD AUTO: 0.01 X10(3) UL (ref 0–0.7)
EOSINOPHIL NFR BLD AUTO: 0.2 %
ERYTHROCYTE [DISTWIDTH] IN BLOOD BY AUTOMATED COUNT: 14.6 %
FLUAV + FLUBV RNA SPEC NAA+PROBE: NEGATIVE
FLUAV + FLUBV RNA SPEC NAA+PROBE: NEGATIVE
GLOBULIN PLAS-MCNC: 2.6 G/DL (ref 2.8–4.4)
GLUCOSE BLD-MCNC: 190 MG/DL (ref 70–99)
GLUCOSE UR STRIP.AUTO-MCNC: 100 MG/DL
HCT VFR BLD AUTO: 46.5 %
HDLC SERPL-MCNC: 58 MG/DL (ref 40–59)
HGB BLD-MCNC: 14.8 G/DL
HYALINE CASTS #/AREA URNS AUTO: PRESENT /LPF
IMM GRANULOCYTES # BLD AUTO: 0.03 X10(3) UL (ref 0–1)
IMM GRANULOCYTES NFR BLD: 0.5 %
LDLC SERPL CALC-MCNC: 51 MG/DL (ref ?–100)
LEUKOCYTE ESTERASE UR QL STRIP.AUTO: NEGATIVE
LYMPHOCYTES # BLD AUTO: 1.22 X10(3) UL (ref 1–4)
LYMPHOCYTES NFR BLD AUTO: 20 %
MCH RBC QN AUTO: 30.6 PG (ref 26–34)
MCHC RBC AUTO-ENTMCNC: 31.8 G/DL (ref 31–37)
MCV RBC AUTO: 96.3 FL
MONOCYTES # BLD AUTO: 0.69 X10(3) UL (ref 0.1–1)
MONOCYTES NFR BLD AUTO: 11.3 %
NEUTROPHILS # BLD AUTO: 4.15 X10 (3) UL (ref 1.5–7.7)
NEUTROPHILS # BLD AUTO: 4.15 X10(3) UL (ref 1.5–7.7)
NEUTROPHILS NFR BLD AUTO: 67.8 %
NITRITE UR QL STRIP.AUTO: NEGATIVE
NONHDLC SERPL-MCNC: 71 MG/DL (ref ?–130)
OSMOLALITY SERPL CALC.SUM OF ELEC: 296 MOSM/KG (ref 275–295)
P AXIS: 75 DEGREES
P-R INTERVAL: 202 MS
PH UR STRIP.AUTO: 6 [PH] (ref 5–8)
PLATELET # BLD AUTO: 125 10(3)UL (ref 150–450)
POTASSIUM SERPL-SCNC: 3.7 MMOL/L (ref 3.5–5.1)
PROCALCITONIN SERPL-MCNC: 0.07 NG/ML (ref ?–0.16)
PROT SERPL-MCNC: 5.9 G/DL (ref 6.4–8.2)
PROT UR STRIP.AUTO-MCNC: 100 MG/DL
Q-T INTERVAL: 502 MS
QRS DURATION: 144 MS
QTC CALCULATION (BEZET): 480 MS
R AXIS: -68 DEGREES
RBC # BLD AUTO: 4.83 X10(6)UL
RSV RNA SPEC NAA+PROBE: NEGATIVE
SARS-COV-2 RNA RESP QL NAA+PROBE: DETECTED
SODIUM SERPL-SCNC: 139 MMOL/L (ref 136–145)
SP GR UR STRIP.AUTO: 1.02 (ref 1–1.03)
T AXIS: 88 DEGREES
TRIGL SERPL-MCNC: 111 MG/DL (ref 30–149)
TROPONIN I SERPL HS-MCNC: 76 NG/L
TROPONIN I SERPL HS-MCNC: 78 NG/L
TROPONIN I SERPL HS-MCNC: 79 NG/L
UROBILINOGEN UR STRIP.AUTO-MCNC: 2 MG/DL
VENTRICULAR RATE: 55 BPM
VLDLC SERPL CALC-MCNC: 16 MG/DL (ref 0–30)
WBC # BLD AUTO: 6.1 X10(3) UL (ref 4–11)
WBC CLUMPS UR QL AUTO: PRESENT /HPF

## 2023-12-21 PROCEDURE — 71045 X-RAY EXAM CHEST 1 VIEW: CPT | Performed by: EMERGENCY MEDICINE

## 2023-12-21 PROCEDURE — 99223 1ST HOSP IP/OBS HIGH 75: CPT | Performed by: INTERNAL MEDICINE

## 2023-12-21 RX ORDER — HEPARIN SODIUM 5000 [USP'U]/ML
5000 INJECTION, SOLUTION INTRAVENOUS; SUBCUTANEOUS EVERY 8 HOURS SCHEDULED
Status: DISCONTINUED | OUTPATIENT
Start: 2023-12-21 | End: 2023-12-23

## 2023-12-21 RX ORDER — SENNOSIDES 8.6 MG
17.2 TABLET ORAL NIGHTLY PRN
Status: DISCONTINUED | OUTPATIENT
Start: 2023-12-21 | End: 2023-12-23

## 2023-12-21 RX ORDER — POTASSIUM CHLORIDE 20 MEQ/1
40 TABLET, EXTENDED RELEASE ORAL ONCE
Status: COMPLETED | OUTPATIENT
Start: 2023-12-21 | End: 2023-12-21

## 2023-12-21 RX ORDER — SENNOSIDES 8.6 MG
17.2 TABLET ORAL NIGHTLY
Status: DISCONTINUED | OUTPATIENT
Start: 2023-12-21 | End: 2023-12-23

## 2023-12-21 RX ORDER — ATORVASTATIN CALCIUM 20 MG/1
20 TABLET, FILM COATED ORAL NIGHTLY
Status: DISCONTINUED | OUTPATIENT
Start: 2023-12-21 | End: 2023-12-23

## 2023-12-21 RX ORDER — MELATONIN
3 NIGHTLY PRN
Status: DISCONTINUED | OUTPATIENT
Start: 2023-12-21 | End: 2023-12-23

## 2023-12-21 RX ORDER — ACETAMINOPHEN 500 MG
500 TABLET ORAL EVERY 4 HOURS PRN
Status: DISCONTINUED | OUTPATIENT
Start: 2023-12-21 | End: 2023-12-23

## 2023-12-21 RX ORDER — BISACODYL 10 MG
10 SUPPOSITORY, RECTAL RECTAL
Status: DISCONTINUED | OUTPATIENT
Start: 2023-12-21 | End: 2023-12-23

## 2023-12-21 RX ORDER — GUAIFENESIN 600 MG/1
600 TABLET, EXTENDED RELEASE ORAL 2 TIMES DAILY
Status: DISCONTINUED | OUTPATIENT
Start: 2023-12-21 | End: 2023-12-23

## 2023-12-21 RX ORDER — ASPIRIN 81 MG/1
81 TABLET, CHEWABLE ORAL DAILY
Status: DISCONTINUED | OUTPATIENT
Start: 2023-12-22 | End: 2023-12-23

## 2023-12-21 RX ORDER — POLYETHYLENE GLYCOL 3350 17 G/17G
17 POWDER, FOR SOLUTION ORAL DAILY PRN
Status: DISCONTINUED | OUTPATIENT
Start: 2023-12-21 | End: 2023-12-23

## 2023-12-21 RX ORDER — ONDANSETRON 2 MG/ML
4 INJECTION INTRAMUSCULAR; INTRAVENOUS EVERY 6 HOURS PRN
Status: DISCONTINUED | OUTPATIENT
Start: 2023-12-21 | End: 2023-12-23

## 2023-12-21 RX ORDER — ASPIRIN 325 MG
325 TABLET, DELAYED RELEASE (ENTERIC COATED) ORAL ONCE
Status: COMPLETED | OUTPATIENT
Start: 2023-12-21 | End: 2023-12-21

## 2023-12-21 NOTE — ED QUICK NOTES
Per patients daughter, patients  recently was seen in the hospital for covid. Patient has not been feeling well, more weak and increase in falls at home. Patient seen by PCP and sent in for covid swab and CXR.

## 2023-12-21 NOTE — ED QUICK NOTES
Orders for admission, patient is aware of plan and ready to go upstairs. Any questions, please call ED RN Denis Olivares at extension [phone]. Patient Covid vaccination status: Unvaccinated     COVID Test Ordered in ED: SARS-CoV-2/Flu A and B/RSV by PCR (GeneXpert)     COVID Suspicion at Admission: COVID +    Running Infusions:  None    Mental Status/LOC at time of transport: Alert and oriented, hard of hearing.     Other pertinent information: Needs two person assist to use restroom (arthritic right knee)  CIWA score: N/A   NIH score:  N/A

## 2023-12-21 NOTE — ED INITIAL ASSESSMENT (HPI)
Pt to ER in wheelchair with daughter. Daughter states pt has been increasingly fatigued for the past 4 days falling nearly everyday and seems confused for 3-4 days (per Prisca). Upon arrival, pt is Aox4. Pt denies any pain. Referred by: Martin Mosley DO; Medical Diagnosis (from order):    Diagnosis Information      Diagnosis    726.2 (ICD-9-CM) - M75.41 (ICD-10-CM) - Impingement syndrome of right shoulder                Physical Therapy -  Daily Treatment Note    Visit:  3     SUBJECTIVE                                                                                                             Patient states his shoulder feels ok, states it feels more loose from working yesterday. Patient states he saw Dr. Zhang who recommended a cortisone injection. Patient states his shoulder felt good after last session, reports feeling more loose. Patient continues to report pain with reaching back to put on a jacket.      Pain / Symptoms:  Pain rating (out of 10): Current: 0     OBJECTIVE                                                                                                                       Observation:   Comments / Details: Tightness and tenderness with palpation of right infraspinatus                TREATMENT                                                                                                                initial evaluation completed  Therapeutic Exercise:  Band resisted shoulder horizontal abduction with scapular retraction 3 x 15  Band resisted high row with green theraband 1 x 15   Band resisted lat pull down with green theraband 1 x 15   Review HEP        Manual Therapy:  STM with TPR right infraspinatus and supraspinatus  Posterior lateral glenohumeral joint mobilization at end range internal rotation right - grade III   Posterior lateral glenohumeral joint mobilization at end range external rotation right - grade III   Inferior glenohumeral joint mobilization at the end range of shoulder flexion right - grade III     Skilled input: as detailed above    Home Exercise Program: (*above indicates provided as part of home exercise program)  Scapular retraction  Band resisted high row   Band resisted low row  Door  pec stretch  Side lying sleeper stretch        ASSESSMENT                                                                                                                 Patient tolerated treatment well, reporting adequate fatigue with exercise.  Patient presented with reports of minimal pain on this date with decreased palpable muscle tension compared to previous session.  Discussion with patient regarding his home exercise program, patient instructed to avoid positions or activities that increase the pain in his shoulder.    Treatment performed on this date was rehabilitative.          Procedures and total treatment time documented Time Entry flowsheet.

## 2023-12-21 NOTE — ED QUICK NOTES
Rounding Completed    Plan of Care reviewed. Waiting for urine specimen result. Elimination needs assessed. Provided toileting with ED Tech. Bed is locked and in lowest position. Call light within reach. Patient daughter at bedside with patient.

## 2023-12-22 ENCOUNTER — APPOINTMENT (OUTPATIENT)
Dept: CV DIAGNOSTICS | Facility: HOSPITAL | Age: 88
DRG: 177 | End: 2023-12-22
Attending: INTERNAL MEDICINE
Payer: MEDICARE

## 2023-12-22 ENCOUNTER — TELEPHONE (OUTPATIENT)
Dept: CASE MANAGEMENT | Facility: HOSPITAL | Age: 88
End: 2023-12-22

## 2023-12-22 ENCOUNTER — APPOINTMENT (OUTPATIENT)
Dept: CV DIAGNOSTICS | Facility: HOSPITAL | Age: 88
End: 2023-12-22
Attending: INTERNAL MEDICINE
Payer: MEDICARE

## 2023-12-22 LAB
ANION GAP SERPL CALC-SCNC: 5 MMOL/L (ref 0–18)
ATRIAL RATE: 60 BPM
BASOPHILS # BLD AUTO: 0.01 X10(3) UL (ref 0–0.2)
BASOPHILS NFR BLD AUTO: 0.2 %
BUN BLD-MCNC: 16 MG/DL (ref 9–23)
CALCIUM BLD-MCNC: 8.6 MG/DL (ref 8.5–10.1)
CHLORIDE SERPL-SCNC: 110 MMOL/L (ref 98–112)
CO2 SERPL-SCNC: 29 MMOL/L (ref 21–32)
CREAT BLD-MCNC: 0.74 MG/DL
EGFRCR SERPLBLD CKD-EPI 2021: 74 ML/MIN/1.73M2 (ref 60–?)
EOSINOPHIL # BLD AUTO: 0.05 X10(3) UL (ref 0–0.7)
EOSINOPHIL NFR BLD AUTO: 1.2 %
ERYTHROCYTE [DISTWIDTH] IN BLOOD BY AUTOMATED COUNT: 14.5 %
GLUCOSE BLD-MCNC: 80 MG/DL (ref 70–99)
HCT VFR BLD AUTO: 44 %
HGB BLD-MCNC: 14.1 G/DL
IMM GRANULOCYTES # BLD AUTO: 0.02 X10(3) UL (ref 0–1)
IMM GRANULOCYTES NFR BLD: 0.5 %
LYMPHOCYTES # BLD AUTO: 1.3 X10(3) UL (ref 1–4)
LYMPHOCYTES NFR BLD AUTO: 31 %
MCH RBC QN AUTO: 30.5 PG (ref 26–34)
MCHC RBC AUTO-ENTMCNC: 32 G/DL (ref 31–37)
MCV RBC AUTO: 95 FL
MONOCYTES # BLD AUTO: 0.52 X10(3) UL (ref 0.1–1)
MONOCYTES NFR BLD AUTO: 12.4 %
NEUTROPHILS # BLD AUTO: 2.29 X10 (3) UL (ref 1.5–7.7)
NEUTROPHILS # BLD AUTO: 2.29 X10(3) UL (ref 1.5–7.7)
NEUTROPHILS NFR BLD AUTO: 54.7 %
OSMOLALITY SERPL CALC.SUM OF ELEC: 298 MOSM/KG (ref 275–295)
P AXIS: 85 DEGREES
P-R INTERVAL: 198 MS
PLATELET # BLD AUTO: 122 10(3)UL (ref 150–450)
POTASSIUM SERPL-SCNC: 3.8 MMOL/L (ref 3.5–5.1)
POTASSIUM SERPL-SCNC: 3.8 MMOL/L (ref 3.5–5.1)
Q-T INTERVAL: 476 MS
QRS DURATION: 134 MS
QTC CALCULATION (BEZET): 476 MS
R AXIS: -65 DEGREES
RBC # BLD AUTO: 4.63 X10(6)UL
SODIUM SERPL-SCNC: 144 MMOL/L (ref 136–145)
STREP PNEUMO ANTIGEN, URINE: NEGATIVE
T AXIS: 82 DEGREES
TROPONIN I SERPL HS-MCNC: 91 NG/L
VENTRICULAR RATE: 60 BPM
WBC # BLD AUTO: 4.2 X10(3) UL (ref 4–11)

## 2023-12-22 PROCEDURE — 93306 TTE W/DOPPLER COMPLETE: CPT | Performed by: INTERNAL MEDICINE

## 2023-12-22 PROCEDURE — 99233 SBSQ HOSP IP/OBS HIGH 50: CPT | Performed by: HOSPITALIST

## 2023-12-22 RX ORDER — LISINOPRIL 2.5 MG/1
2.5 TABLET ORAL DAILY
Status: DISCONTINUED | OUTPATIENT
Start: 2023-12-22 | End: 2023-12-23

## 2023-12-22 RX ORDER — HYDRALAZINE HYDROCHLORIDE 20 MG/ML
10 INJECTION INTRAMUSCULAR; INTRAVENOUS EVERY 6 HOURS PRN
Status: DISCONTINUED | OUTPATIENT
Start: 2023-12-22 | End: 2023-12-23

## 2023-12-22 RX ORDER — POTASSIUM CHLORIDE 20 MEQ/1
40 TABLET, EXTENDED RELEASE ORAL ONCE
Status: COMPLETED | OUTPATIENT
Start: 2023-12-22 | End: 2023-12-22

## 2023-12-22 NOTE — PROGRESS NOTES
12/21/23 1804 12/21/23 1805 12/21/23 1808   Vital Signs   BP (!) 192/68 (!) 173/103 (!) 157/104   MAP (mmHg) 94 (!) 118 (!) 118   BP Location Right arm Right arm Right arm   BP Method Automatic Automatic Automatic   Patient Position Lying Sitting Standing     Pt denies dizziness, moving arm during BP readings.

## 2023-12-22 NOTE — PHYSICAL THERAPY NOTE
PHYSICAL THERAPY EVALUATION - INPATIENT     Room Number: 1651/8007-Q  Evaluation Date: 12/22/2023  Type of Evaluation: Initial  Physician Order: PT Eval and Treat    Presenting Problem: COVID19  Co-Morbidities : CAD, HTN, HLD  Reason for Therapy: Mobility Dysfunction and Discharge Planning    History related to current admission: Patient is a 80year old female admitted on 12/21/2023 from home for fatigue and AMS. Pt diagnosed with COVID19.      ASSESSMENT   In this PT evaluation, the patient presents with the following impairments balance impairments, poor safety awareness, impulsivity, decreased insight into impairments, and decreased activity tolerance. These impairments and comorbidities manifest themselves as functional limitations in independent bed mobility, transfers, and gait. The patient is below baseline and would benefit from skilled inpatient PT to address the above deficits to assist patient in returning to prior to level of function. Functional outcome measures completed include [facility]. The AM-PAC '6-Clicks' Inpatient Basic Mobility Short Form was completed and this patient is demonstrating a Approx Degree of Impairment: 35.83%  degree of impairment in mobility. Research supports that patients with this level of impairment may benefit from HHPT and intermittent supervision for safety. DISCHARGE RECOMMENDATIONS  PT Discharge Recommendations: Home with home health PT; Intermittent Supervision    PLAN  PT Treatment Plan: Bed mobility; Endurance; Energy conservation;Patient education;Gait training;Strengthening;Balance training;Transfer training  Rehab Potential : Fair  Frequency (Obs): 3-5x/week  Number of Visits to Meet Established Goals: 5      CURRENT GOALS    Goal #1 Patient is able to demonstrate supine - sit EOB @ level: supervision- MET     Goal #2 Patient is able to demonstrate transfers EOB to/from [facility] at assistance level: supervision     Goal #3 Patient is able to ambulate 75 feet with assist device: walker - rolling at assistance level: supervision     Goal #4    Goal #5    Goal #6    Goal Comments: Goals established on 12/22/2023    HOME SITUATION  Type of Home: [address]: One level                Lives With: Spouse (spouse currently in hospital)  Drives: No  Patient Owned Equipment: Rolling walker       Prior Level of [place]: Pt  lives with spouse in single story home. Pts spouse currently hospitalized with COVID19 per chair. Pt ambulates with RW. Pt has assist with ADL, as needed. SUBJECTIVE  \"My daughter lives in [place]. She can't stay with me. \"       OBJECTIVE  Precautions: None  Fall Risk: High fall risk    WEIGHT BEARING RESTRICTION  Weight Bearing Restriction: None                PAIN ASSESSMENT  Rating: Unable to rate  Location: R knee  Management Techniques:  Activity promotion;Repositioning    COGNITION  Orientation Level:  oriented to place, oriented to time, and oriented to person  Memory:  appears intact  Following Commands:  follows one step commands with increased time and follows one step commands with repetition  Initiation: cues to initiate tasks  Safety Judgement:  decreased awareness of need for safety  Awareness of Errors:  decreased awareness of errors     RANGE OF MOTION AND STRENGTH ASSESSMENT  Upper extremity ROM and strength are within functional limits     Lower extremity ROM is within functional limits     Lower extremity strength is within functional limits except for the following:    Right Knee extension  3+/5  Left Knee extension  3+/5      BALANCE  Static Sitting: Good  Dynamic Sitting: Good  Static Standing: Fair -  Dynamic Standing: Poor +    ADDITIONAL TESTS                                    ACTIVITY TOLERANCE                         O2 WALK  Oxygen Therapy  SPO2% on Room Air at Rest: 98    NEUROLOGICAL FINDINGS                        AM-PAC '6-Clicks' INPATIENT SHORT FORM - BASIC MOBILITY  How much difficulty does the patient currently have... Patient Difficulty: Turning over in bed (including adjusting bedclothes, sheets and blankets)?: None   Patient Difficulty: Sitting down on and standing up from a chair with arms (e.g., wheelchair, bedside commode, etc.): A Little   Patient Difficulty: Moving from lying on back to sitting on the side of the bed?: None   How much help from another person does the patient currently need. .. Help from Another: Moving to and from a bed to a chair (including a wheelchair)?: A Little   Help from Another: Need to walk in hospital room?: A Little   Help from Another: Climbing 3-5 steps with a railing?: A Little       AM-PAC Score:  Raw Score: 20   Approx Degree of Impairment: 35.83%   Standardized Score (AM-PAC Scale): 47.67   CMS Modifier (G-Code): CJ    FUNCTIONAL ABILITY STATUS  Gait Assessment   Functional Mobility/Gait Assessment  Gait Assistance: Contact guard assist  Distance (ft): 75  Assistive Device: Rolling walker  Pattern: R Decreased stance time (antalgic gait pattern)    Skilled Therapy Provided     Bed Mobility:  Rolling: supervision  Supine to sit: supervision   Sit to supine: supervision     Transfer Mobility:  Sit to stand: CGA   Stand to sit: CGA  Gait = CGA with RW  Gait training: Pt ambulates with antalgic gait pattern and decreased stance time on R LE due to chronic knee pain. Pt given VC sequencing with RW and safety with pacing. Pt impulsive at times with RW. Pt denies dyspnea. Exercise/Education Provided:  Bed mobility  Energy conservation  Functional activity tolerated  Gait training  Posture  Strengthening  Transfer training    Patient End of Session: In bed;Needs met;Call light within reach;RN aware of session/findings; All patient questions and concerns addressed; Alarm set; Discussed recommendations with /      Patient Evaluation Complexity Level:  History High - 3 or more personal factors and/or co-morbidities   Examination of body systems Moderate - addressing a total of 3 or more elements   Clinical Presentation Low - Stable   Clinical Decision Making Low - Stable       PT Session Time: 20 minutes  Gait Training: 10 minutes

## 2023-12-22 NOTE — PLAN OF CARE
Assumed care of patient at [phone]. Pt A/Ox 4, forgetful. O2 sats maintained on room air. NSR with PVCs on tele. Last BM 12/19. Voiding without difficulty. Pt reports no pain. Pt up x1 assist and walker. Pt updated on plan of care. Care needs met. Bed in lowest position, Call light within reach. Bed alarm on. Skin check completed with DAVIDSON Carson.      POC: echo, PT/OT, trend troponin     Problem: Patient/Family Goals  Goal: Patient/Family Long Term Goal  Description: Patient's Long Term Goal: discharge home    Interventions:  - taking prescribed meds, labs, echo   - See additional Care Plan goals for specific interventions  Outcome: Progressing  Goal: Patient/Family Short Term Goal  Description: Patient's Short Term Goal: feel better    Interventions:   - taking prescribed meds, labs, echo   - See additional Care Plan goals for specific interventions  Outcome: Progressing

## 2023-12-22 NOTE — PLAN OF CARE
Assumed care of patient at 0700. Pt A/Ox 4, forgetful. O2 sats maintained on room air. NSR with PVCs on tele. Last BM 12/19. Voiding without difficulty. Pt reports no pain. Pt up x1 assist and walker. Pt updated on plan of care. Care needs met. Bed in lowest position, Call light within reach. Bed alarm on.       POC: echo, PT/OT      Problem: Patient/Family Goals  Goal: Patient/Family Long Term Goal  Description: Patient's Long Term Goal: discharge home    Interventions:  - taking prescribed meds, labs, echo   - See additional Care Plan goals for specific interventions  Outcome: Progressing  Goal: Patient/Family Short Term Goal  Description: Patient's Short Term Goal: feel better    Interventions:   - taking prescribed meds, labs, echo   - See additional Care Plan goals for specific interventions  Outcome: Progressing

## 2023-12-23 VITALS
OXYGEN SATURATION: 98 % | SYSTOLIC BLOOD PRESSURE: 166 MMHG | BODY MASS INDEX: 16.49 KG/M2 | WEIGHT: 93.06 LBS | TEMPERATURE: 98 F | HEART RATE: 82 BPM | HEIGHT: 63 IN | DIASTOLIC BLOOD PRESSURE: 84 MMHG | RESPIRATION RATE: 18 BRPM

## 2023-12-23 PROBLEM — R73.9 HYPERGLYCEMIA: Status: RESOLVED | Noted: 2020-03-29 | Resolved: 2023-12-23

## 2023-12-23 LAB — POTASSIUM SERPL-SCNC: 3.9 MMOL/L (ref 3.5–5.1)

## 2023-12-23 PROCEDURE — 99239 HOSP IP/OBS DSCHRG MGMT >30: CPT | Performed by: INTERNAL MEDICINE

## 2023-12-23 RX ORDER — LISINOPRIL 10 MG/1
10 TABLET ORAL DAILY
Status: DISCONTINUED | OUTPATIENT
Start: 2023-12-23 | End: 2023-12-23

## 2023-12-23 RX ORDER — AZITHROMYCIN 500 MG/1
500 TABLET, FILM COATED ORAL DAILY
Qty: 1 TABLET | Refills: 0 | Status: SHIPPED | OUTPATIENT
Start: 2023-12-24 | End: 2023-12-25

## 2023-12-23 RX ORDER — METOPROLOL SUCCINATE 25 MG/1
25 TABLET, EXTENDED RELEASE ORAL
Qty: 30 TABLET | Refills: 0 | Status: SHIPPED | OUTPATIENT
Start: 2023-12-23

## 2023-12-23 RX ORDER — METOPROLOL SUCCINATE 25 MG/1
25 TABLET, EXTENDED RELEASE ORAL
Status: DISCONTINUED | OUTPATIENT
Start: 2023-12-23 | End: 2023-12-23

## 2023-12-23 RX ORDER — ATORVASTATIN CALCIUM 20 MG/1
20 TABLET, FILM COATED ORAL NIGHTLY
Qty: 30 TABLET | Refills: 0 | Status: SHIPPED | OUTPATIENT
Start: 2023-12-23

## 2023-12-23 RX ORDER — ASPIRIN 81 MG/1
81 TABLET, CHEWABLE ORAL DAILY
Qty: 30 TABLET | Refills: 0 | Status: SHIPPED | OUTPATIENT
Start: 2023-12-23

## 2023-12-23 RX ORDER — AZITHROMYCIN 250 MG/1
500 TABLET, FILM COATED ORAL
Status: DISCONTINUED | OUTPATIENT
Start: 2023-12-23 | End: 2023-12-23

## 2023-12-23 RX ORDER — LISINOPRIL 10 MG/1
10 TABLET ORAL DAILY
Qty: 30 TABLET | Refills: 0 | Status: SHIPPED | OUTPATIENT
Start: 2023-12-23

## 2023-12-23 RX ORDER — CEFDINIR 300 MG/1
300 CAPSULE ORAL 2 TIMES DAILY
Qty: 12 CAPSULE | Refills: 0 | Status: SHIPPED | OUTPATIENT
Start: 2023-12-23 | End: 2023-12-29

## 2023-12-23 NOTE — CM/SW NOTE
12/23/23 1300   CM/SW Referral Data   Referral Source Social Work (self-referral)   Reason for Referral Discharge planning   Informant Daughter       Spoke with daughter over the phone regarding discharge planning and recommendations. PT recommending [facility] at discharge. Agreeable to recommendation. SW sent [place] Referrals in [place]. Orders entered. Daughter appreciative.      MARIAH Wen, [facility]  Discharge [facility]

## 2023-12-23 NOTE — PROGRESS NOTES
[facility]acy Note: Route Optimization for Azithromycin [facility])    Patient is currently on Azithromycin (ZITHROMAX) 500 mg IV every 24 hours. The patient meets the criteria to convert to the oral equivalent as established by the IV to Oral conversion protocol approved by the P&T committee. Medication was changed from IV formulation to Azithromycin (ZITHROMAX)  500 mg PO every 24 hours per protocol.       India Rocha PharmD  12/23/2023,  7:25 AM

## 2023-12-23 NOTE — DISCHARGE INSTRUCTIONS
Please have repeat chest xray in 6-8 weeks to ensure improvement of your current pneumonia findings.

## 2023-12-23 NOTE — DISCHARGE PLANNING
Discharge instructions reviewed with patient and daughter. Both acknowledged understanding. Tele box and IV removed. Patient transported to [facility], via wheelchair, with all paperwork and belongings.

## 2023-12-23 NOTE — PLAN OF CARE
Assumed care of patient at change of shift resting in bed. AO x3-4, forgetful at times. [place]. NSR on tele monitor. O2 sat adequate on RA. Denies chest pain and shortness of breath. Covid isolation maintained. Plan of care updated. Bed locked, in lowest position, with alarm on. Call light and personal items in reach. All needs met.     Interventions:  - taking prescribed meds, labs, echo   - See additional Care Plan goals for specific interventions  Outcome: Progressing  Goal: Patient/Family Short Term Goal  Description: Patient's Short Term Goal: feel better    Interventions:   - taking prescribed meds, labs, echo   - See additional Care Plan goals for specific interventions  Outcome: Progressing     Problem: RESPIRATORY - ADULT  Goal: Achieves optimal ventilation and oxygenation  Description: INTERVENTIONS:  - Assess for changes in respiratory status  - Assess for changes in mentation and behavior  - Position to facilitate oxygenation and minimize respiratory effort  - Oxygen supplementation based on oxygen saturation or ABGs  - Provide Smoking Cessation handout, if applicable  - Encourage broncho-pulmonary hygiene including cough, deep breathe, Incentive Spirometry  - Assess the need for suctioning and perform as needed  - Assess and instruct to report SOB or any respiratory difficulty  - Respiratory Therapy support as indicated  - Manage/alleviate anxiety  - Monitor for signs/symptoms of CO2 retention  Outcome: Progressing

## 2023-12-23 NOTE — PLAN OF CARE
Assumed care of patient at [address]. Patient is alert and orientated x3. Currently room air. Lung sounds clear/diminished. NSR on tele. Continent of bowel and bladder. Up 1 assist and walker. Call light within reach. Fall precautions in place. Plan of care:  IV abx  Monitor blood pressure    2215: Pt refused to take nighttime medications. Educated pt on the importance and the reason for nighttime medications. Still refused. Paged on call hosp and called daughter to let her know.      Problem: Patient/Family Goals  Goal: Patient/Family Long Term Goal  Description: Patient's Long Term Goal: discharge home    Interventions:  - taking prescribed meds, labs, echo   - See additional Care Plan goals for specific interventions  Outcome: Progressing  Goal: Patient/Family Short Term Goal  Description: Patient's Short Term Goal: feel better    Interventions:   - taking prescribed meds, labs, echo   - See additional Care Plan goals for specific interventions  Outcome: Progressing

## 2023-12-26 NOTE — CM/SW NOTE
Pt discharged over the holiday weekend. [address] responded that they can accept pt for [facility]. Reserved in 2010 Luci Robb and sent AVS. Requested for [address] to reach out to pt/family for start of services.      [address]ya [address]  P: [phone]  F: [phone] [facility], [facility], [facility]  Discharge [facility] [facility].

## 2024-01-25 ENCOUNTER — LAB ENCOUNTER (OUTPATIENT)
Dept: LAB | Age: 89
End: 2024-01-25
Attending: INTERNAL MEDICINE
Payer: MEDICARE

## 2024-01-25 DIAGNOSIS — E78.5 HYPERLIPEMIA: ICD-10-CM

## 2024-01-25 DIAGNOSIS — I10 ESSENTIAL HYPERTENSION, MALIGNANT: Primary | ICD-10-CM

## 2024-01-25 DIAGNOSIS — R73.9 BLOOD GLUCOSE ELEVATED: ICD-10-CM

## 2024-01-25 DIAGNOSIS — I25.10 CORONARY ATHEROSCLEROSIS OF NATIVE CORONARY ARTERY: ICD-10-CM

## 2024-01-25 LAB
ALBUMIN SERPL-MCNC: 4.3 G/DL (ref 3.4–5)
ALBUMIN/GLOB SERPL: 1.5 {RATIO} (ref 1–2)
ALP LIVER SERPL-CCNC: 114 U/L
ANION GAP SERPL CALC-SCNC: 1 MMOL/L (ref 0–18)
AST SERPL-CCNC: 29 U/L (ref 15–37)
BASOPHILS # BLD AUTO: 0.04 X10(3) UL (ref 0–0.2)
BASOPHILS NFR BLD AUTO: 0.5 %
BILIRUB SERPL-MCNC: 0.8 MG/DL (ref 0.1–2)
BUN BLD-MCNC: 18 MG/DL (ref 9–23)
CALCIUM BLD-MCNC: 9.9 MG/DL (ref 8.5–10.1)
CHLORIDE SERPL-SCNC: 106 MMOL/L (ref 98–112)
CHOLEST SERPL-MCNC: 191 MG/DL (ref ?–200)
CO2 SERPL-SCNC: 31 MMOL/L (ref 21–32)
CREAT BLD-MCNC: 1.04 MG/DL
EGFRCR SERPLBLD CKD-EPI 2021: 49 ML/MIN/1.73M2 (ref 60–?)
EOSINOPHIL # BLD AUTO: 0.07 X10(3) UL (ref 0–0.7)
EOSINOPHIL NFR BLD AUTO: 0.9 %
ERYTHROCYTE [DISTWIDTH] IN BLOOD BY AUTOMATED COUNT: 14.1 %
FASTING PATIENT LIPID ANSWER: YES
FASTING STATUS PATIENT QL REPORTED: YES
GLOBULIN PLAS-MCNC: 2.9 G/DL (ref 2.8–4.4)
GLUCOSE BLD-MCNC: 105 MG/DL (ref 70–99)
HCT VFR BLD AUTO: 49.8 %
HDLC SERPL-MCNC: 94 MG/DL (ref 40–59)
HGB BLD-MCNC: 15.6 G/DL
IMM GRANULOCYTES # BLD AUTO: 0.02 X10(3) UL (ref 0–1)
IMM GRANULOCYTES NFR BLD: 0.3 %
LDLC SERPL CALC-MCNC: 83 MG/DL (ref ?–100)
LYMPHOCYTES # BLD AUTO: 1.18 X10(3) UL (ref 1–4)
LYMPHOCYTES NFR BLD AUTO: 15.2 %
MCH RBC QN AUTO: 30.6 PG (ref 26–34)
MCHC RBC AUTO-ENTMCNC: 31.3 G/DL (ref 31–37)
MCV RBC AUTO: 97.6 FL
MONOCYTES # BLD AUTO: 0.68 X10(3) UL (ref 0.1–1)
MONOCYTES NFR BLD AUTO: 8.7 %
NEUTROPHILS # BLD AUTO: 5.79 X10 (3) UL (ref 1.5–7.7)
NEUTROPHILS # BLD AUTO: 5.79 X10(3) UL (ref 1.5–7.7)
NEUTROPHILS NFR BLD AUTO: 74.4 %
NONHDLC SERPL-MCNC: 97 MG/DL (ref ?–130)
NT-PROBNP SERPL-MCNC: 846 PG/ML (ref ?–450)
OSMOLALITY SERPL CALC.SUM OF ELEC: 288 MOSM/KG (ref 275–295)
PLATELET # BLD AUTO: 256 10(3)UL (ref 150–450)
POTASSIUM SERPL-SCNC: 4.7 MMOL/L (ref 3.5–5.1)
PROT SERPL-MCNC: 7.2 G/DL (ref 6.4–8.2)
RBC # BLD AUTO: 5.1 X10(6)UL
SODIUM SERPL-SCNC: 138 MMOL/L (ref 136–145)
TRIGL SERPL-MCNC: 81 MG/DL (ref 30–149)
TSI SER-ACNC: 3.3 MIU/ML (ref 0.36–3.74)
VIT D+METAB SERPL-MCNC: 28.3 NG/ML (ref 30–100)
VLDLC SERPL CALC-MCNC: 13 MG/DL (ref 0–30)
WBC # BLD AUTO: 7.8 X10(3) UL (ref 4–11)

## 2024-01-25 PROCEDURE — 85025 COMPLETE CBC W/AUTO DIFF WBC: CPT

## 2024-01-25 PROCEDURE — 80053 COMPREHEN METABOLIC PANEL: CPT

## 2024-01-25 PROCEDURE — 83880 ASSAY OF NATRIURETIC PEPTIDE: CPT

## 2024-01-25 PROCEDURE — 80061 LIPID PANEL: CPT

## 2024-01-25 PROCEDURE — 36415 COLL VENOUS BLD VENIPUNCTURE: CPT

## 2024-01-25 PROCEDURE — 84443 ASSAY THYROID STIM HORMONE: CPT

## 2024-01-25 PROCEDURE — 82306 VITAMIN D 25 HYDROXY: CPT

## 2024-05-26 ENCOUNTER — HOSPITAL ENCOUNTER (INPATIENT)
Facility: HOSPITAL | Age: 89
LOS: 3 days | Discharge: NURSING, REHAB OR LONG-TERM CARE | DRG: 064 | End: 2024-05-31
Attending: EMERGENCY MEDICINE | Admitting: INTERNAL MEDICINE
Payer: MEDICARE

## 2024-05-26 ENCOUNTER — APPOINTMENT (OUTPATIENT)
Dept: GENERAL RADIOLOGY | Facility: HOSPITAL | Age: 89
DRG: 064 | End: 2024-05-26
Attending: EMERGENCY MEDICINE
Payer: MEDICARE

## 2024-05-26 DIAGNOSIS — R26.2 UNABLE TO AMBULATE: ICD-10-CM

## 2024-05-26 DIAGNOSIS — I63.9 CEREBROVASCULAR ACCIDENT (CVA), UNSPECIFIED MECHANISM (HCC): ICD-10-CM

## 2024-05-26 DIAGNOSIS — S32.020A CLOSED COMPRESSION FRACTURE OF L2 VERTEBRA, INITIAL ENCOUNTER (HCC): Primary | ICD-10-CM

## 2024-05-26 PROBLEM — N17.9 AKI (ACUTE KIDNEY INJURY) (HCC): Status: ACTIVE | Noted: 2024-05-26

## 2024-05-26 PROBLEM — M54.9 INTRACTABLE BACK PAIN: Status: ACTIVE | Noted: 2024-05-26

## 2024-05-26 LAB
ATRIAL RATE: 119 BPM
P-R INTERVAL: 184 MS
Q-T INTERVAL: 380 MS
QRS DURATION: 132 MS
QTC CALCULATION (BEZET): 534 MS
R AXIS: -73 DEGREES
T AXIS: 105 DEGREES
VENTRICULAR RATE: 119 BPM

## 2024-05-26 PROCEDURE — 72110 X-RAY EXAM L-2 SPINE 4/>VWS: CPT | Performed by: EMERGENCY MEDICINE

## 2024-05-26 PROCEDURE — 99223 1ST HOSP IP/OBS HIGH 75: CPT | Performed by: INTERNAL MEDICINE

## 2024-05-26 PROCEDURE — 72190 X-RAY EXAM OF PELVIS: CPT | Performed by: EMERGENCY MEDICINE

## 2024-05-26 RX ORDER — ISOSORBIDE MONONITRATE 30 MG/1
30 TABLET, EXTENDED RELEASE ORAL DAILY
COMMUNITY

## 2024-05-26 RX ORDER — SODIUM PHOSPHATE, DIBASIC AND SODIUM PHOSPHATE, MONOBASIC 7; 19 G/230ML; G/230ML
1 ENEMA RECTAL ONCE AS NEEDED
Status: DISCONTINUED | OUTPATIENT
Start: 2024-05-26 | End: 2024-05-31

## 2024-05-26 RX ORDER — HEPARIN SODIUM 5000 [USP'U]/ML
5000 INJECTION, SOLUTION INTRAVENOUS; SUBCUTANEOUS EVERY 8 HOURS SCHEDULED
Status: DISCONTINUED | OUTPATIENT
Start: 2024-05-26 | End: 2024-05-27

## 2024-05-26 RX ORDER — ACETAMINOPHEN 500 MG
500 TABLET ORAL EVERY 4 HOURS PRN
Status: DISCONTINUED | OUTPATIENT
Start: 2024-05-26 | End: 2024-05-31

## 2024-05-26 RX ORDER — MORPHINE SULFATE 2 MG/ML
1 INJECTION, SOLUTION INTRAMUSCULAR; INTRAVENOUS EVERY 2 HOUR PRN
Status: DISCONTINUED | OUTPATIENT
Start: 2024-05-26 | End: 2024-05-31

## 2024-05-26 RX ORDER — MORPHINE SULFATE 2 MG/ML
2 INJECTION, SOLUTION INTRAMUSCULAR; INTRAVENOUS ONCE
Status: COMPLETED | OUTPATIENT
Start: 2024-05-26 | End: 2024-05-26

## 2024-05-26 RX ORDER — ONDANSETRON 2 MG/ML
4 INJECTION INTRAMUSCULAR; INTRAVENOUS EVERY 4 HOURS PRN
Status: ACTIVE | OUTPATIENT
Start: 2024-05-26 | End: 2024-05-26

## 2024-05-26 RX ORDER — CARVEDILOL 3.12 MG/1
3.12 TABLET ORAL 2 TIMES DAILY WITH MEALS
COMMUNITY
End: 2024-05-31

## 2024-05-26 RX ORDER — ONDANSETRON 2 MG/ML
4 INJECTION INTRAMUSCULAR; INTRAVENOUS EVERY 6 HOURS PRN
Status: DISCONTINUED | OUTPATIENT
Start: 2024-05-26 | End: 2024-05-31

## 2024-05-26 RX ORDER — BISACODYL 10 MG
10 SUPPOSITORY, RECTAL RECTAL
Status: DISCONTINUED | OUTPATIENT
Start: 2024-05-26 | End: 2024-05-31

## 2024-05-26 RX ORDER — MORPHINE SULFATE 2 MG/ML
2 INJECTION, SOLUTION INTRAMUSCULAR; INTRAVENOUS EVERY 2 HOUR PRN
Status: DISCONTINUED | OUTPATIENT
Start: 2024-05-26 | End: 2024-05-31

## 2024-05-26 RX ORDER — HYDROMORPHONE HYDROCHLORIDE 1 MG/ML
0.5 INJECTION, SOLUTION INTRAMUSCULAR; INTRAVENOUS; SUBCUTANEOUS EVERY 30 MIN PRN
Status: ACTIVE | OUTPATIENT
Start: 2024-05-26 | End: 2024-05-26

## 2024-05-26 RX ORDER — SENNOSIDES 8.6 MG
17.2 TABLET ORAL NIGHTLY PRN
Status: DISCONTINUED | OUTPATIENT
Start: 2024-05-26 | End: 2024-05-31

## 2024-05-26 RX ORDER — METOPROLOL SUCCINATE 25 MG/1
25 TABLET, EXTENDED RELEASE ORAL
Status: DISCONTINUED | OUTPATIENT
Start: 2024-05-27 | End: 2024-05-31

## 2024-05-26 RX ORDER — HYDROCODONE BITARTRATE AND ACETAMINOPHEN 5; 325 MG/1; MG/1
1 TABLET ORAL 3 TIMES DAILY
Status: DISCONTINUED | OUTPATIENT
Start: 2024-05-26 | End: 2024-05-31

## 2024-05-26 RX ORDER — SODIUM CHLORIDE, SODIUM LACTATE, POTASSIUM CHLORIDE, CALCIUM CHLORIDE 600; 310; 30; 20 MG/100ML; MG/100ML; MG/100ML; MG/100ML
INJECTION, SOLUTION INTRAVENOUS CONTINUOUS
Status: ACTIVE | OUTPATIENT
Start: 2024-05-26 | End: 2024-05-27

## 2024-05-26 RX ORDER — ASPIRIN 81 MG/1
81 TABLET, CHEWABLE ORAL DAILY
Status: DISCONTINUED | OUTPATIENT
Start: 2024-05-26 | End: 2024-05-27

## 2024-05-26 RX ORDER — HYDROCODONE BITARTRATE AND ACETAMINOPHEN 5; 325 MG/1; MG/1
2 TABLET ORAL 3 TIMES DAILY
Status: DISCONTINUED | OUTPATIENT
Start: 2024-05-26 | End: 2024-05-31

## 2024-05-26 RX ORDER — POLYETHYLENE GLYCOL 3350 17 G/17G
17 POWDER, FOR SOLUTION ORAL DAILY PRN
Status: DISCONTINUED | OUTPATIENT
Start: 2024-05-26 | End: 2024-05-31

## 2024-05-26 RX ORDER — ATORVASTATIN CALCIUM 20 MG/1
20 TABLET, FILM COATED ORAL NIGHTLY
Status: DISCONTINUED | OUTPATIENT
Start: 2024-05-26 | End: 2024-05-31

## 2024-05-26 RX ORDER — SENNOSIDES 8.6 MG
17.2 TABLET ORAL NIGHTLY
Status: DISCONTINUED | OUTPATIENT
Start: 2024-05-26 | End: 2024-05-31

## 2024-05-26 RX ORDER — MORPHINE SULFATE 2 MG/ML
0.5 INJECTION, SOLUTION INTRAMUSCULAR; INTRAVENOUS EVERY 2 HOUR PRN
Status: DISCONTINUED | OUTPATIENT
Start: 2024-05-26 | End: 2024-05-31

## 2024-05-26 RX ORDER — ACETAMINOPHEN 500 MG
1000 TABLET ORAL 3 TIMES DAILY
Status: DISCONTINUED | OUTPATIENT
Start: 2024-05-26 | End: 2024-05-31

## 2024-05-26 NOTE — PLAN OF CARE
NURSING ADMISSION NOTE      Patient admitted via Cart from ED.   Oriented to room.  Safety precautions initiated.  Bed in low position.  Call light in reach.    Pt A&Ox2. IV fluids infusing as ordered. BP elevated, pt dtr states she did not take BP meds this AM. Pt restless, dtr at bedside. Bed alarm on. Pt reminded to \"call not fall\". Call light within reach. Will continue to monitor.

## 2024-05-26 NOTE — ED QUICK NOTES
Spoke to daughter Fiona on phone regarding incident. Fiona reports that she was recently discharged from [facility] a couple of days ago for a \"mild heart attack.\" Today, the patient called fiona stating she fell of the couch and couldn't get up herself. Fiona stated she was in a lot of pain while walking to the bathroom, and for that reason she decided to call 911.

## 2024-05-26 NOTE — ED PROVIDER NOTES
Patient Seen in: [facility] Emergency Department      History     Chief Complaint   Patient presents with    Fall     Stated Complaint: Fall    Subjective:     HPI    89-year-old woman with CAD and recent admission to [facility].  The daughter relays that she was admitted because her heart enzyme was abnormal and that there was \"heart damage.\"  She had medication adjustment and was discharged.  She lives at home with her daughter.  Today, patient reported a fall at home, managed to get herself up.  The daughter was at work but there was a friend with her.  It was reported that she was able to get herself up to the couch without assistance.  No reported pain in the head, neck, chest, or abdomen. Reported pain in the lower back.      Objective:   Past Medical History:    Coronary atherosclerosis    Essential hypertension    High blood pressure    High cholesterol              History reviewed. No pertinent surgical history.             Social History     Socioeconomic History    Marital status:    Tobacco Use    Smoking status: Never    Smokeless tobacco: Never   Vaping Use    Vaping status: Never Used   Substance and Sexual Activity    Alcohol use: Not Currently    Drug use: Never     Social Determinants of Health     Food Insecurity: No Food Insecurity (12/21/2023)    Food Insecurity     Food Insecurity: Never true   Transportation Needs: No Transportation Needs (12/21/2023)    Transportation Needs     Lack of Transportation: No   Physical Activity: Inactive (2/25/2021)    Received from [employer]    Exercise Vital Sign     Days of Exercise per Week: 0 days     Minutes of Exercise per Session: 0 min   Housing Stability: Low Risk  (12/21/2023)    Housing Stability     Housing Instability: No              Review of Systems    Positive for stated complaint: Fall  Other systems are as noted in HPI.  Constitutional and vital signs reviewed.      All other  systems reviewed and negative except as noted above.    Physical Exam     ED Triage Vitals   BP 05/26/24 1344 136/80   Pulse 05/26/24 1344 117   Resp 05/26/24 1344 14   Temp 05/26/24 1401 98.7 °F (37.1 °C)   Temp src 05/26/24 1401 Oral   SpO2 05/26/24 1344 96 %   O2 Device 05/26/24 1344 None (Room air)       Current:/80   Pulse 111   Temp 98.7 °F (37.1 °C) (Oral)   Resp 22   Ht 160 cm (5' 3\")   Wt 49.9 kg   SpO2 95%   BMI 19.49 kg/m²         General:  Vitals as listed.  No acute distress   HEENT: Sclerae anicteric.  Conjunctivae show no pallor.  Oropharynx clear, mucous membranes moist  Neck: No posterior tenderness.  Chest: No tenderness  Lungs: Decreased air exchange and clear   Heart: regular rate rhythm and no murmur  Back: No spinous process tenderness.  Abdomen: Soft and nontender.  Pelvis: Stable without tenderness upon rocking.  Extremities: Atraumatic.  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   LOCATION:  [facility]   Dictated by (CST): Jamaal Mireles MD on 5/26/2024 at 4:20 PM     Finalized by (CST): Jamaal Mireles MD on 5/26/2024 at 4:22 PM       XR LUMBAR SPINE (MIN 4 VIEWS) (CPT=72110)    Result Date: 5/26/2024  CONCLUSION:   1. Age-indeterminate compression fracture of the superior endplate of L2 which is mild.  If there is concern for an acute fracture, further evaluation with MRI lumbar spine would be recommended.   2. Moderate multilevel degenerative changes of the lumbar spine.   LOCATION:  [facility]   Dictated by (CST): Jamaal Mireles MD on 5/26/2024 at 4:19 PM     Finalized by (CST): Jamaal Mireles MD on 5/26/2024 at 4:20 PM        I independently read the  radiographs and arthritis of lumbar spine and right hip.  EKG    Rate, intervals and axes as noted on EKG Report.  Rate: 119  Rhythm: Sinus Rhythm  Reading: Very frequent PVCs. QRST morphology similar to EKG done 12/22/2023           ED COURSE and MDM     Sources of the medical history included the patient and her daughter who accompanies her.    The daughter relayed that she is having a lot of difficulty managing the patient at home.  I recommend that  evaluate the patient for SNF.    I reviewed prior external notes including echocardiogram done 12/22/2023 that showed an ejection fraction of 30 to 35% with diffuse hypokinesis    The staff tried to eat the patient up and she is struggling due to back pain.  She cannot ambulate at this time.    With Dr. Leal, [facility].    I have discussed with the patient the results of testing, differential diagnosis, and treatment plan. They expressed clear understanding of these instructions and agrees to the plan provided.    Disposition and Plan     Clinical Impression:  1. Closed compression fracture of L2 vertebra, initial encounter ([facility])    2. Unable to ambulate         Disposition:  Admit  5/26/2024  4:24 pm    Follow-up:  No follow-up provider specified.      Medications Prescribed:  Current Discharge Medication List

## 2024-05-26 NOTE — ED INITIAL ASSESSMENT (HPI)
Patient BIBA from home for unwitnessed fall at home, slid off couch onto carpet. Daughter arrived and seen her on ground. According to EMS patient took an extra dose of isosorbide and carvedilol today that was supposed to be for tomorrow. EKG done by EMS - Afib RVR. Patient complaining of back pain and right hip, with shortening of right leg.

## 2024-05-26 NOTE — ED QUICK NOTES
Orders for admission, patient is aware of plan and ready to go upstairs. Any questions, please call ED RN Zan at extension [phone].     Patient Covid vaccination status: Unvaccinated     COVID Test Ordered in ED: None    COVID Suspicion at Admission: N/A    Running Infusions:  None    Mental Status/LOC at time of transport: A/Ox3    Other pertinent information:   CIWA score: N/A   NIH score:  N/A

## 2024-05-26 NOTE — H&P
[facility]     Tierra Anderson Patient Status:  Emergency    1927 MRN [id number]   [facility] EMERGENCY DEPARTMENT Attending Demond Anderson MD   Hosp Day # 0 PCP [facility] W[facility]     Chief Complaint: back pain    Subjective:    History of Present Illness:     Tierra Anderson is a 89 year old female with pmhx of CAD, HTN, HLD who presents from home with complaint of back pain. Pt is a poor historian and limited d/t pain at time of encounter. History obtained from ED signout, chart review, and daughter at bedside. Daughter reports pt was just released from OSH ([facility]) a few days ago after a fall and was told she \"had a mild heart attack\". She was treated supportively and her cardiac medication regimen was adjusted. She was doing well yesterday at home after discharge. This morning, she felt weak and slid off the couch. She did not hit her head or lose consciousness. Denies any chest pain/pressure, SOB/TOUSSAINT, palpitations, abdominal pain, n/v/d, fevers/chills, urinary symptoms. She was able to pull herself back up onto the cough, but had severe L sided back pain. She asked her daughter to call 911 d/t severity of pain.     History/Other:    Past Medical History:  Past Medical History:    Coronary atherosclerosis    Essential hypertension    High blood pressure    High cholesterol     Past Surgical History:   History reviewed. No pertinent surgical history.   Family History:   No family history on file.  Social History:    reports that she has never smoked. She has never used smokeless tobacco. She reports that she does not currently use alcohol. She reports that she does not use drugs.     Allergies:   Allergies   Allergen Reactions    Aspirin OTHER (SEE COMMENTS)     Stomach upset       Medications:    No current facility-administered medications on file prior to encounter.     Current Outpatient Medications on File Prior to Encounter   Medication Sig  Dispense Refill    atorvastatin 20 MG Oral Tab Take 1 tablet (20 mg total) by mouth nightly. 30 tablet 0    aspirin 81 MG Oral Chew Tab Chew 1 tablet (81 mg total) by mouth daily. 30 tablet 0    lisinopril 10 MG Oral Tab Take 1 tablet (10 mg total) by mouth daily. 30 tablet 0    metoprolol succinate ER 25 MG Oral Tablet 24 Hr Take 1 tablet (25 mg total) by mouth Daily Beta Blocker. 30 tablet 0    Calcium Carb-Cholecalciferol (CALCIUM CARBONATE-VITAMIN D) 250-125 MG-UNIT Oral Tab Take 2 tablets by mouth 3 (three) times daily with meals. 100 tablet 0    docusate sodium 100 MG Oral Cap Take 100 mg by mouth 2 (two) times daily as needed for constipation. 60 capsule 0    Senna 17.2 MG Oral Tab Take 1 tablet (17.2 mg total) by mouth nightly. 30 tablet 0    celecoxib 200 MG Oral Cap Take 1 capsule (200 mg total) by mouth 2 (two) times daily.         Review of Systems:   A comprehensive review of systems was completed.    Pertinent positives and negatives noted in the HPI.    Objective:   Physical Exam:    /80   Pulse 111   Temp 98.7 °F (37.1 °C) (Oral)   Resp 22   Ht 5' 3\" (1.6 m)   Wt 110 lb (49.9 kg)   SpO2 95%   BMI 19.49 kg/m²   General:in severe pain; elderly, frail/cachetic appearing   Respiratory: No rhonchi, no wheezes  Cardiovascular: S1, S2. Regular rate and rhythm  Abdomen: Soft, Non-tender, non-distended, positive bowel sounds  Neuro: No new focal deficits  Extremities: No edema      Results:    Labs:      Labs Last 24 Hours:    No results for input(s): \"RBC\", \"HGB\", \"HCT\", \"MCV\", \"MCH\", \"MCHC\", \"RDW\", \"NEPRELIM\", \"WBC\", \"PLT\" in the last 168 hours.    No results for input(s): \"GLU\", \"BUN\", \"CREATSERUM\", \"GFRAA\", \"GFRNAA\", \"EGFRCR\", \"CA\", \"ALB\", \"NA\", \"K\", \"CL\", \"CO2\", \"ALKPHO\", \"AST\", \"ALT\", \"BILT\", \"TP\" in the last 168 hours.    Lab Results   Component Value Date    INR 1.04 03/29/2020       No results for input(s): \"TROP\", \"TROPHS\", \"CK\" in the last 168 hours.    No results for input(s): \"TROP\",  \"PBNP\" in the last 168 hours.    No results for input(s): \"PCT\" in the last 168 hours.    Imaging: Imaging data reviewed in Epic.    Assessment & Plan:      #Intractable back pain  #Mechanical fall  #Inability to ambulate  #L2 compression fracture  - pain control, anti-emetics PRN  - PT/OT    #Acute kidney injury  - gentle IVF  - hold NSAIDs, ACEi    #Hyperglycemia, suspect reactive    #HTN  - metoprolol  - hold lisinopril d/t WESLEY > resume in am if renal function improving    #HLD  - statin    #CAD  - aspirin, statin        Plan of care discussed with pt, pt's daughter, ED    Kathy Leal, DO    Supplementary Documentation:     The 21st Century Cures Act makes medical notes like these available to patients in the interest of transparency. Please be advised this is a medical document. Medical documents are intended to carry relevant information, facts as evident, and the clinical opinion of the practitioner. The medical note is intended as peer to peer communication and may appear blunt or direct. It is written in medical language and may contain abbreviations or verbiage that are unfamiliar.

## 2024-05-27 ENCOUNTER — APPOINTMENT (OUTPATIENT)
Dept: CT IMAGING | Facility: HOSPITAL | Age: 89
DRG: 064 | End: 2024-05-27
Attending: INTERNAL MEDICINE
Payer: MEDICARE

## 2024-05-27 ENCOUNTER — APPOINTMENT (OUTPATIENT)
Dept: MRI IMAGING | Facility: HOSPITAL | Age: 89
DRG: 064 | End: 2024-05-27
Attending: INTERNAL MEDICINE
Payer: MEDICARE

## 2024-05-27 LAB
ANION GAP SERPL CALC-SCNC: 6 MMOL/L (ref 0–18)
BASOPHILS # BLD AUTO: 0.05 X10(3) UL (ref 0–0.2)
BASOPHILS NFR BLD AUTO: 0.4 %
BUN BLD-MCNC: 30 MG/DL (ref 9–23)
CALCIUM BLD-MCNC: 9.1 MG/DL (ref 8.5–10.1)
CHLORIDE SERPL-SCNC: 109 MMOL/L (ref 98–112)
CO2 SERPL-SCNC: 25 MMOL/L (ref 21–32)
CREAT BLD-MCNC: 0.94 MG/DL
EGFRCR SERPLBLD CKD-EPI 2021: 56 ML/MIN/1.73M2 (ref 60–?)
EOSINOPHIL # BLD AUTO: 0.19 X10(3) UL (ref 0–0.7)
EOSINOPHIL NFR BLD AUTO: 1.7 %
ERYTHROCYTE [DISTWIDTH] IN BLOOD BY AUTOMATED COUNT: 14.7 %
GLUCOSE BLD-MCNC: 122 MG/DL (ref 70–99)
HCT VFR BLD AUTO: 44 %
HGB BLD-MCNC: 14.5 G/DL
IMM GRANULOCYTES # BLD AUTO: 0.09 X10(3) UL (ref 0–1)
IMM GRANULOCYTES NFR BLD: 0.8 %
LYMPHOCYTES # BLD AUTO: 1.03 X10(3) UL (ref 1–4)
LYMPHOCYTES NFR BLD AUTO: 9 %
MCH RBC QN AUTO: 29.8 PG (ref 26–34)
MCHC RBC AUTO-ENTMCNC: 33 G/DL (ref 31–37)
MCV RBC AUTO: 90.3 FL
MONOCYTES # BLD AUTO: 0.69 X10(3) UL (ref 0.1–1)
MONOCYTES NFR BLD AUTO: 6 %
NEUTROPHILS # BLD AUTO: 9.41 X10 (3) UL (ref 1.5–7.7)
NEUTROPHILS # BLD AUTO: 9.41 X10(3) UL (ref 1.5–7.7)
NEUTROPHILS NFR BLD AUTO: 82.1 %
OSMOLALITY SERPL CALC.SUM OF ELEC: 297 MOSM/KG (ref 275–295)
PLATELET # BLD AUTO: 102 10(3)UL (ref 150–450)
POTASSIUM SERPL-SCNC: 3.8 MMOL/L (ref 3.5–5.1)
RBC # BLD AUTO: 4.87 X10(6)UL
SODIUM SERPL-SCNC: 140 MMOL/L (ref 136–145)
WBC # BLD AUTO: 11.5 X10(3) UL (ref 4–11)

## 2024-05-27 PROCEDURE — 70547 MR ANGIOGRAPHY NECK W/O DYE: CPT | Performed by: INTERNAL MEDICINE

## 2024-05-27 PROCEDURE — 70544 MR ANGIOGRAPHY HEAD W/O DYE: CPT | Performed by: INTERNAL MEDICINE

## 2024-05-27 PROCEDURE — 99232 SBSQ HOSP IP/OBS MODERATE 35: CPT | Performed by: INTERNAL MEDICINE

## 2024-05-27 PROCEDURE — 70551 MRI BRAIN STEM W/O DYE: CPT | Performed by: INTERNAL MEDICINE

## 2024-05-27 PROCEDURE — 70450 CT HEAD/BRAIN W/O DYE: CPT | Performed by: INTERNAL MEDICINE

## 2024-05-27 RX ORDER — LISINOPRIL 10 MG/1
10 TABLET ORAL DAILY
Status: DISCONTINUED | OUTPATIENT
Start: 2024-05-27 | End: 2024-05-27

## 2024-05-27 RX ORDER — POTASSIUM CHLORIDE 14.9 MG/ML
20 INJECTION INTRAVENOUS ONCE
Status: COMPLETED | OUTPATIENT
Start: 2024-05-27 | End: 2024-05-27

## 2024-05-27 RX ORDER — LABETALOL HYDROCHLORIDE 5 MG/ML
10 INJECTION, SOLUTION INTRAVENOUS EVERY 10 MIN PRN
Status: DISCONTINUED | OUTPATIENT
Start: 2024-05-27 | End: 2024-05-31

## 2024-05-27 RX ORDER — ACETAMINOPHEN 650 MG/1
650 SUPPOSITORY RECTAL EVERY 4 HOURS PRN
Status: DISCONTINUED | OUTPATIENT
Start: 2024-05-27 | End: 2024-05-31

## 2024-05-27 RX ORDER — SODIUM CHLORIDE 9 MG/ML
INJECTION, SOLUTION INTRAVENOUS CONTINUOUS
Status: DISCONTINUED | OUTPATIENT
Start: 2024-05-27 | End: 2024-05-29

## 2024-05-27 RX ORDER — ACETAMINOPHEN 325 MG/1
650 TABLET ORAL EVERY 4 HOURS PRN
Status: DISCONTINUED | OUTPATIENT
Start: 2024-05-27 | End: 2024-05-31

## 2024-05-27 RX ORDER — HYDRALAZINE HYDROCHLORIDE 20 MG/ML
10 INJECTION INTRAMUSCULAR; INTRAVENOUS EVERY 2 HOUR PRN
Status: DISCONTINUED | OUTPATIENT
Start: 2024-05-27 | End: 2024-05-31

## 2024-05-27 RX ORDER — ISOSORBIDE MONONITRATE 30 MG/1
30 TABLET, EXTENDED RELEASE ORAL DAILY
Status: DISCONTINUED | OUTPATIENT
Start: 2024-05-27 | End: 2024-05-31

## 2024-05-27 NOTE — PLAN OF CARE
Consult to Neurosurgery Dr. Leger, unsure if will see pt as he only asked if Neurology was consulted when paged with new consult and never responded otherwise.      Consult to Neurology Dr. Ruvalcaba, transfer pt to CTU 3 or 7 with neurochecks and someone will see her in the morning.

## 2024-05-27 NOTE — PROGRESS NOTES
[facility]   part of [facility]     Hospitalist Progress Note     Tierra Anderson Patient Status:  Observation    1927 MRN [id number]   Location [facility] 3SW-A Attending Kathy Leal, DO   Hosp Day # 0 PCP [facility]     Chief Complaint: back pain    Subjective:     Patient much more awake/alert, conversational. Reports back pain is much improved     Objective:    Review of Systems:   A comprehensive review of systems was completed; pertinent positive and negatives stated in subjective.    Vital signs:  Temp:  [97.9 °F (36.6 °C)-98.7 °F (37.1 °C)] 98.1 °F (36.7 °C)  Pulse:  [] 85  Resp:  [14-22] 18  BP: (136-172)/(56-90) 155/80  SpO2:  [92 %-97 %] 96 %    Physical Exam:    General: No acute distress, frail/elderly appearing  Respiratory: No wheezes, no rhonchi  Cardiovascular: S1, S2, regular rate and rhythm  Abdomen: Soft, Non-tender, non-distended, positive bowel sounds  Neuro: No new focal deficits.   Extremities: No edema    Diagnostic Data:    Labs:  Recent Labs   Lab 24  0409   WBC 11.5*   HGB 14.5   MCV 90.3   .0*       Recent Labs   Lab 24  0409   *   BUN 30*   CREATSERUM 0.94   CA 9.1      K 3.8      CO2 25.0       Estimated Creatinine Clearance: 27.6 mL/min (based on SCr of 0.94 mg/dL).    No results for input(s): \"TROP\", \"TROPHS\", \"CK\" in the last 168 hours.    No results for input(s): \"PTP\", \"INR\" in the last 168 hours.     Microbiology    No results found for this visit on 24.      Imaging: Reviewed in Epic.    Medications:    calcium carbonate-vitamin D  2 tablet Oral TID CC    sennosides  17.2 mg Oral Nightly    atorvastatin  20 mg Oral Nightly    aspirin  81 mg Oral Daily    metoprolol succinate ER  25 mg Oral Daily Beta Blocker    heparin  5,000 Units Subcutaneous Q8H ROSAURA    acetaminophen  1,000 mg Oral TID    Or    HYDROcodone-acetaminophen  1 tablet Oral TID    Or    HYDROcodone-acetaminophen  2 tablet Oral TID        Assessment & Plan:      #Intractable back pain  #Mechanical fall  #Inability to ambulate  #L2 compression fracture  - CTH ordered   - pain control, anti-emetics PRN  - PT/OT     #Acute kidney injury  - improving with gentle IVF  - hold NSAIDs     #Hyperglycemia, suspect reactive     #HTN  - metoprolol  - resume lisinopril, imdur     #HLD  - statin    #CAD  - aspirin, statin         Kathy Leal, DO    Supplementary Documentation:     Quality:  DVT Mechanical Prophylaxis:   SCDs,    DVT Pharmacologic Prophylaxis   Medication    heparin (Porcine) 5000 UNIT/ML injection 5,000 Units      DVT Pharmacologic prophylaxis: Aspirin 162 mg         Code Status: Full Code  Gibbons: No urinary catheter in place  Gibbons Duration (in days):   Central line:    GISSELL:     Discharge is dependent on: placement, medically ready for discharge  At this point Ms. Anderson is expected to be discharge to: [facility]    The 21st Century Cures Act makes medical notes like these available to patients in the interest of transparency. Please be advised this is a medical document. Medical documents are intended to carry relevant information, facts as evident, and the clinical opinion of the practitioner. The medical note is intended as peer to peer communication and may appear blunt or direct. It is written in medical language and may contain abbreviations or verbiage that are unfamiliar.

## 2024-05-27 NOTE — CM/SW NOTE
SW completed chart review. Anticipated therapy need: Gradual Rehabilitative Therapy. [facility] request sent. GT referral sent via [employer], need pasrr, and will need insurance auth if approved by [facility].    HOME SITUATION  Type of Home: House  Home Layout: One level  Lives With: Alone     Toilet and Equipment: Standard height toilet  Shower/Tub and Equipment:  (sponge bathes)     Hand Dominance: Right  Drives: Yes     Prior Level of Function: Per daughter, pt is independent with ADL and mobility via walker. She sponge bathes. She drives and manages her own finances. Daughter visits daily but is often at work. Above home info per daughter.      Pt states she lives with spouse and daughter, takes tub baths with assist from daughter.      Per chart review history of falls, however daughter reports her only recent fall was from recent admit to [facility].     STACY/EUGENIO to remain available for dc planning, and/or additional need for support.    Abhinav Doe, BRIDGETTE  Discharge Planner  [phone]

## 2024-05-27 NOTE — PLAN OF CARE
Alert and oriented x 2. VSS; on room air. Patient states no pain. Patient denies any numbness or tingling. Purewick in place. PT/OT to see. POC discussed with patient. Safety precautions in place. Call light within reach.

## 2024-05-27 NOTE — OCCUPATIONAL THERAPY NOTE
OCCUPATIONAL THERAPY EVALUATION - INPATIENT     Room Number: 353/353-A  Evaluation Date: 5/27/2024  Type of Evaluation: Initial  Presenting Problem: L2 fx, rececnt admit to [facility] for fall and heart attack.    Physician Order: IP Consult to Occupational Therapy  Reason for Therapy: ADL/IADL Dysfunction and Discharge Planning    OCCUPATIONAL THERAPY ASSESSMENT   Patient is currently functioning below baseline with  ADL and mobility . Prior to admission, patient's baseline is independent with ADL,  mobility with RW.  Patient is requiring maximum assist and dependent as a result of the following impairments: decreased functional strength, impaired sitting balance, impaired motor planning, cognitive deficits (following commands, executive function), decreased insight to deficits, and decreased safety awareness. Occupational Therapy will continue to follow for duration of hospitalization.    Patient will benefit from continued skilled OT Services to promote return to prior level of function and safety with continuous assistance and gradual rehabilitative therapy       History Related to Current Admission: Patient is a 89 year old female admitted on 5/26/2024 with Presenting Problem: L2 fx, rececnt admit to [facility] for fall and heart attack.. Co-Morbidities : CAD, HTN, scoliosis (per xray L spine)    Recommendations for nursing staff:   Transfers: total lift, would benefit from posey belt alarm if up to chair  Toileting location: bed level    EVALUATION SESSION  Patient Start of Session: semi supine  FUNCTIONAL TRANSFER ASSESSMENT     BED MOBILITY  Supine to Sit : Dependent  Sit to Supine (OT): Dependent  Scooting: Perpetually scoots self towards EOB, not responsive to safety cueing, requires max (A) for BLE blocking    BALANCE ASSESSMENT  Static Sitting: -- (Max LE blocking to prevent slide, CGA core stability)    FUNCTIONAL ADL ASSESSMENT  Eating: Stand-by Assist      ACTIVITY TOLERANCE: vitals wfl                          O2 SATURATIONS       Cognition  Impaired  Oriented to self, hospital, states month is June  Impaired sustained attention  Follows one step commands inconsistently  Absent safety awareness  Absent insight into deficits  Impulsive  Provides inaccurate PLOF    Upper extremity  ROM WFL. Not following commands for MMT. Able to use UEs effectively for scooting.     EDUCATION PROVIDED  Patient: Role of Occupational Therapy; Plan of Care; Functional Transfer Techniques; Fall Prevention  Patient's Response to Education: Does Not Demonstrate Skills Needed for Learning      Equipment used: none      Therapist comments: Seen bed level, asking to go to the bathroom. Max of 2 to EOB, not following instructions for log roll. Unsafely scooting toward EOB as noted, requiring max (A) to block LEs. Maintains R side lean while EOB, but not losing balance to R; spinal curve likely contributing. Unsafe to attempt sit to stand. Max of 2 to supine. SBA eating task bed level. Requesting water repeatedly despite cueing to attend to cup on tray. Discussed cognition with RN; pt has not had CTH this admission.     Patient End of Session: In bed;Needs met;Call light within reach;RN aware of session/findings;All patient questions and concerns addressed;SCDs in place;Alarm set    OCCUPATIONAL PROFILE    HOME SITUATION  Type of Home: [facility]  Home Layout: One level;Two level;Able to live on main level  Lives With: Alone    Toilet and Equipment: Standard height toilet  Shower/Tub and Equipment:  (sponge bathes)          Hand Dominance: Right  Drives: Yes  Patient Regularly Uses: Reading glasses    Prior Level of Function: Per daughter, pt is independent with ADL and mobility via walker. She sponge bathes. She drives and manages her own finances. Daughter visits daily but is often at work. Above home info per daughter.     PT Alison called daughter. Per PT:   \"pt lives in two [facility], stays on main floor of home. Lives alone,  but dtr (Itzel) visits everyday. Supposed to ambulate with RW at all times. Up until now she was doing everything on her own- sponge bathing, dressing, etc.   Drives- just passed her drivers test and renewed her license.   Does laundry one in a while, otherwise dtr does it for her. Daughter brings over meals each day.   Writes her own checks for her bills.   Had recent admission at [facility] for \"mild heart attack\" (per dtr) and had a change in meds.\"         SUBJECTIVE   Pt states she is from [place].    PAIN ASSESSMENT  Ratin  Location: denies       OBJECTIVE  Precautions: Bed/chair alarm;Spine  Fall Risk: High fall risk    WEIGHT BEARING RESTRICTION  Weight Bearing Restriction: None                ASSESSMENTS    AM-PAC ‘6-Clicks’ Inpatient Daily Activity Short Form  -   Putting on and taking off regular lower body clothing?: Total  -   Bathing (including washing, rinsing, drying)?: Total  -   Toileting, which includes using toilet, bedpan or urinal? : Total  -   Putting on and taking off regular upper body clothing?: A Lot  -   Taking care of personal grooming such as brushing teeth?: A Little  -   Eating meals?: A Little    AM-PAC Score:  Score: 11  Approx Degree of Impairment: 70.42%  Standardized Score (AM-PAC Scale): 29.04    PLAN  OT Treatment Plan: Balance activities;ADL training;Functional transfer training;Cognitive reorientation;Patient/Family education;Patient/Family training;Equipment eval/education;Compensatory technique education  Rehab Potential : Fair  Frequency: 3-5x/week  Number of Visits to Meet Established Goals: 5    ADL Goals  Patient will perform toileting with mod (A) and AE PRN  Patient will perform LB dressing with mod (A) and AE PRN    Functional Transfer Goals  Patient will perform bed mobility supine to sit with mod (A)  Patient will perform bed mobility sit to supine with mod (A)  Patient will perform commode transfer with mod (A)    Additional Goals:  Patient will maintain  spine precautions during ADL with cueing prn  Pt will sit EOB CGA 5 minutes      Patient Evaluation Complexity Level:   Occupational Profile/Medical History LOW - Brief history including review of medical or therapy records    Specific performance deficits impacting engagement in ADL/IADL LOW  1 - 3 performance deficits    Client Assessment/Performance Deficits LOW - No comorbidities nor modifications of tasks    Clinical Decision Making LOW - Analysis of occupational profile, problem-focused assessments, limited treatment options    Overall Complexity LOW     OT Session Time: 30 minutes  Self-Care Home Management:  minutes  Therapeutic Activity: 15 minutes  Neuromuscular Re-education:  minutes  Therapeutic Exercise:  minutes  Cognitive Skills:  minutes  Sensory Integrative:  minutes  Orthotic Management and Training:  minutes  Can add/delete any of these

## 2024-05-27 NOTE — PLAN OF CARE
A&O x 1-2. VSS. On RA. No c/o pain at rest, scheduled Tylenol given. SCD's/Heparin. PT/OT to see. Plan TBD. Will continue to monitor.           1000 Per pt dtr she lives alone and is not normally confused, drives, pays her own bills. MD paged for CT head d/t new onset confusion/unwitnessed fall. Orders rec'd.

## 2024-05-27 NOTE — PHYSICAL THERAPY NOTE
PHYSICAL THERAPY EVALUATION - INPATIENT     Room Number: 353/353-A  Evaluation Date: 5/27/2024  Type of Evaluation: Initial  Physician Order: PT Eval and Treat    Presenting Problem: s/p fall off couch  Co-Morbidities : CAD, HTN, scoliosis (per xray L spine)  Reason for Therapy: Mobility Dysfunction and Discharge Planning    XR PELVIS: Severe osteoarthritis of the right hip with avascular necrosis without acute bony injury.   XR LUMBAR SPINE:   There is mild to moderate levoscoliosis of the lumbar spine.  There is no acute fracture dislocation.  There is diffuse osteopenia.  There is moderate degenerative disc disease throughout the lumbar spine most pronounced at L3-4.  There is mild to   moderate facet joint degenerative changes on the right greater than left at L3-4 L4-5 and L5-S1.  There is mild depression of the superior endplate of L2 which is an age indeterminate compression fracture.   PHYSICAL THERAPY ASSESSMENT   Patient is currently functioning below baseline with bed mobility, transfers, gait, maintaining seated position, and standing prolonged periods.  Prior to admission, patient's baseline is Abhay with RW.  Patient is requiring maximum assist and dependent as a result of the following impairments: decreased functional strength, impaired sitting balance, impaired coordination, impaired motor planning, decreased muscular endurance, difficulty maintaining precautions, cognitive deficits (decr command following, decr awareness of need for safety and assistance, decr executive functioning ), and medical status.  Physical Therapy will continue to follow for duration of hospitalization.    Patient will benefit from continued skilled PT Services to promote return to prior level of function and safety with continuous assistance and gradual rehabilitative therapy .    PLAN  PT Treatment Plan: Bed mobility;Body mechanics;Coordination;Endurance;Energy conservation;Patient education;Family education;Gait  training;Range of motion;Neuromuscular re-educate;Strengthening;Stoop training;Stair training;Transfer training;Balance training  Rehab Potential : Fair  Frequency (Obs): 3-5x/week  Number of Visits to Meet Established Goals: 5      CURRENT GOALS    Goal #1 Patient is able to demonstrate supine - sit EOB @ level: supervision     Goal #2 Patient is able to demonstrate transfers Sit to/from Stand at assistance level: supervision     Goal #3 Patient is able to ambulate 50 feet with assist device: walker - rolling at assistance level: minimum assistance     Goal #4    Goal #5    Goal #6    Goal Comments: Goals established on 5/27/2024    PHYSICAL THERAPY MEDICAL/SOCIAL HISTORY  History related to current admission: Patient is a 89 year old female admitted on 5/26/2024 from home for fall off couch with c/o back pain.  Pt diagnosed with L2 compression fracture.    HOME SITUATION  Type of Home: House   Home Layout: One level  Stairs to Enter : 2             Lives With: Alone  Drives: Yes  Patient Owned Equipment: Rolling walker  Patient Regularly Uses: Reading glasses    Prior Level of [place]:   Pt questionable historian- reported she lived in house with her spouse, ambulates with rollator, unable to tell much more.  Writer called daughter (Itzel)- pt lives in [facility], stays on main floor of home. Lives alone, but dtr (Itzel) visits everyday. Supposed to ambulate with RW at all times. Up until now she was doing everything on her own- sponge bathing, dressing, etc.   Drives- just passed her drivers test and renewed her license.   Does laundry one in a while, otherwise dtr does it for her. Daughter brings over meals each day.   Writes her own checks for her bills.   Had recent admission at [facility] for \"mild heart attack\" (per dtr) and had a change in meds.    SUBJECTIVE  \"I'm in this nice hospital.\"    OBJECTIVE  Precautions: Bed/chair alarm;Spine  Fall Risk: High fall risk    WEIGHT BEARING  RESTRICTION  Weight Bearing Restriction: None                PAIN ASSESSMENT  Ratin  Location: denies pain       COGNITION  Overall Cognitive Status:  Impaired  Orientation Level:  oriented to place, oriented to person, and reported it was Mi  Memory:  impaired working memory and decreased short term memory  Following Commands:  follows one-step commands inconsistently  Motor Planning: impaired  Safety Judgement:  decreased awareness of need for assistance and decreased awareness of need for safety  Awareness of Errors:  assistance required to identify errors made, assistance required to correct errors made, and decreased awareness of errors   Awareness of Deficits:  decreased awareness of deficits  Problem Solving:  assistance required to identify errors made, assistance required to generate solutions, and assistance required to implement solutions    RANGE OF MOTION AND STRENGTH ASSESSMENT  Upper extremity ROM and strength are within functional limits   Lower extremity ROM is within functional limits   Lower extremity strength is within functional limits     BALANCE  Static Sitting: Poor -  Dynamic Sitting: Not tested  Static Standing: Not tested  Dynamic Standing: Not tested    ADDITIONAL TESTS                                    ACTIVITY TOLERANCE                         O2 WALK       NEUROLOGICAL FINDINGS                        AM-PAC '6-Clicks' INPATIENT SHORT FORM - BASIC MOBILITY  How much difficulty does the patient currently have...  Patient Difficulty: Turning over in bed (including adjusting bedclothes, sheets and blankets)?: A Lot   Patient Difficulty: Sitting down on and standing up from a chair with arms (e.g., wheelchair, bedside commode, etc.): A Lot   Patient Difficulty: Moving from lying on back to sitting on the side of the bed?: A Lot   How much help from another person does the patient currently need...   Help from Another: Moving to and from a bed to a chair (including a wheelchair)?: A  Lot   Help from Another: Need to walk in hospital room?: Total   Help from Another: Climbing 3-5 steps with a railing?: Total       AM-PAC Score:  Raw Score: 10   Approx Degree of Impairment: 76.75%   Standardized Score (AM-PAC Scale): 32.29   CMS Modifier (G-Code): CL    FUNCTIONAL ABILITY STATUS  Gait Assessment   Functional Mobility/Gait Assessment  Gait Assistance: Not tested    Skilled Therapy Provided     Bed Mobility:  Rolling: attempted to initiate log roll, but pt unable to do so   Supine to sit: maxA x 2   Sit to supine: maxA x 2      Transfer Mobility: Not safe to test at this time     Therapist's Comments:   Patient presents resting in bed, asking to go to bathroom. MaxA x 2 to EOB, not able to follow one step commands for log roll. Pt noted to have right lean (did observe scoliosis which may have been contributing to lean). Pt kept scooting forward EOB, was instructed to stop scooting but kept doing so- requiring maxA to block BLE. Deemed not safe at this time to attempt standing. MaxA x 2 to return supine. Bed alarm on at end of session.  Discussed with RN concern of pt's cognition- no imaging of head has been completed at time of eval. RN to make MD aware of pt's mentation status.     Exercise/Education Provided:  Bed mobility  Body mechanics  Energy conservation  Functional activity tolerated  Posture    Patient End of Session: In bed;Needs met;Call light within reach;RN aware of session/findings;All patient questions and concerns addressed;Alarm set;Discussed recommendations with /    Patient Evaluation Complexity Level:  History Moderate - 1 or 2 personal factors and/or co-morbidities   Examination of body systems Low - addressing 1-2 elements   Clinical Presentation Low - Stable   Clinical Decision Making Low - Stable     PT Session Time: 30 minutes  Therapeutic Activity: 15 minutes

## 2024-05-28 ENCOUNTER — ANESTHESIA (OUTPATIENT)
Dept: MRI IMAGING | Facility: HOSPITAL | Age: 89
DRG: 064 | End: 2024-05-28
Payer: MEDICARE

## 2024-05-28 ENCOUNTER — APPOINTMENT (OUTPATIENT)
Dept: MRI IMAGING | Facility: HOSPITAL | Age: 89
DRG: 064 | End: 2024-05-28
Attending: Other
Payer: MEDICARE

## 2024-05-28 ENCOUNTER — NURSE ONLY (OUTPATIENT)
Dept: ELECTROPHYSIOLOGY | Facility: HOSPITAL | Age: 89
DRG: 064 | End: 2024-05-28
Payer: MEDICARE

## 2024-05-28 ENCOUNTER — ANESTHESIA EVENT (OUTPATIENT)
Dept: MRI IMAGING | Facility: HOSPITAL | Age: 89
DRG: 064 | End: 2024-05-28
Payer: MEDICARE

## 2024-05-28 PROBLEM — I63.9 CEREBROVASCULAR ACCIDENT (CVA) (HCC): Status: ACTIVE | Noted: 2024-05-28

## 2024-05-28 PROBLEM — I63.531 HEMORRHAGIC INFARCTION INVOLVING POSTERIOR CEREBRAL CIRCULATION OF RIGHT SIDE (HCC): Status: ACTIVE | Noted: 2024-05-28

## 2024-05-28 PROBLEM — G93.40 ENCEPHALOPATHY: Status: ACTIVE | Noted: 2024-05-28

## 2024-05-28 LAB
AMMONIA PLAS-MCNC: 16 UMOL/L (ref 11–32)
GLUCOSE BLD-MCNC: 102 MG/DL (ref 70–99)
GLUCOSE BLD-MCNC: 110 MG/DL (ref 70–99)
GLUCOSE BLD-MCNC: 97 MG/DL (ref 70–99)
POTASSIUM SERPL-SCNC: 4.2 MMOL/L (ref 3.5–5.1)
TSI SER-ACNC: 2.03 MIU/ML (ref 0.36–3.74)

## 2024-05-28 PROCEDURE — 99232 SBSQ HOSP IP/OBS MODERATE 35: CPT | Performed by: INTERNAL MEDICINE

## 2024-05-28 PROCEDURE — 99223 1ST HOSP IP/OBS HIGH 75: CPT | Performed by: OTHER

## 2024-05-28 PROCEDURE — B335ZZZ MAGNETIC RESONANCE IMAGING (MRI) OF BILATERAL COMMON CAROTID ARTERIES: ICD-10-PCS | Performed by: RADIOLOGY

## 2024-05-28 PROCEDURE — 70547 MR ANGIOGRAPHY NECK W/O DYE: CPT | Performed by: OTHER

## 2024-05-28 PROCEDURE — B338ZZZ MAGNETIC RESONANCE IMAGING (MRI) OF BILATERAL INTERNAL CAROTID ARTERIES: ICD-10-PCS | Performed by: RADIOLOGY

## 2024-05-28 PROCEDURE — 70544 MR ANGIOGRAPHY HEAD W/O DYE: CPT | Performed by: OTHER

## 2024-05-28 PROCEDURE — 95819 EEG AWAKE AND ASLEEP: CPT | Performed by: OTHER

## 2024-05-28 PROCEDURE — B33RZZZ MAGNETIC RESONANCE IMAGING (MRI) OF INTRACRANIAL ARTERIES: ICD-10-PCS | Performed by: RADIOLOGY

## 2024-05-28 PROCEDURE — B030ZZZ MAGNETIC RESONANCE IMAGING (MRI) OF BRAIN: ICD-10-PCS | Performed by: RADIOLOGY

## 2024-05-28 PROCEDURE — 70551 MRI BRAIN STEM W/O DYE: CPT | Performed by: OTHER

## 2024-05-28 RX ORDER — MEPERIDINE HYDROCHLORIDE 25 MG/ML
12.5 INJECTION INTRAMUSCULAR; INTRAVENOUS; SUBCUTANEOUS AS NEEDED
Status: DISCONTINUED | OUTPATIENT
Start: 2024-05-28 | End: 2024-05-28 | Stop reason: HOSPADM

## 2024-05-28 RX ORDER — ONDANSETRON 2 MG/ML
4 INJECTION INTRAMUSCULAR; INTRAVENOUS EVERY 6 HOURS PRN
Status: DISCONTINUED | OUTPATIENT
Start: 2024-05-28 | End: 2024-05-28 | Stop reason: HOSPADM

## 2024-05-28 RX ORDER — MIDAZOLAM HYDROCHLORIDE 1 MG/ML
1 INJECTION INTRAMUSCULAR; INTRAVENOUS EVERY 5 MIN PRN
Status: DISCONTINUED | OUTPATIENT
Start: 2024-05-28 | End: 2024-05-28 | Stop reason: HOSPADM

## 2024-05-28 RX ORDER — NALOXONE HYDROCHLORIDE 0.4 MG/ML
80 INJECTION, SOLUTION INTRAMUSCULAR; INTRAVENOUS; SUBCUTANEOUS AS NEEDED
Status: DISCONTINUED | OUTPATIENT
Start: 2024-05-28 | End: 2024-05-28 | Stop reason: HOSPADM

## 2024-05-28 RX ORDER — NYSTATIN 100000 [USP'U]/ML
5 SUSPENSION ORAL 4 TIMES DAILY
Status: DISCONTINUED | OUTPATIENT
Start: 2024-05-28 | End: 2024-05-31

## 2024-05-28 RX ORDER — METOCLOPRAMIDE HYDROCHLORIDE 5 MG/ML
5 INJECTION INTRAMUSCULAR; INTRAVENOUS EVERY 8 HOURS PRN
Status: DISCONTINUED | OUTPATIENT
Start: 2024-05-28 | End: 2024-05-28 | Stop reason: HOSPADM

## 2024-05-28 RX ORDER — HYDROCODONE BITARTRATE AND ACETAMINOPHEN 10; 325 MG/1; MG/1
1 TABLET ORAL ONCE AS NEEDED
Status: DISCONTINUED | OUTPATIENT
Start: 2024-05-28 | End: 2024-05-28 | Stop reason: HOSPADM

## 2024-05-28 RX ORDER — HYDROCODONE BITARTRATE AND ACETAMINOPHEN 10; 325 MG/1; MG/1
2 TABLET ORAL ONCE AS NEEDED
Status: DISCONTINUED | OUTPATIENT
Start: 2024-05-28 | End: 2024-05-28 | Stop reason: HOSPADM

## 2024-05-28 RX ORDER — HYDROMORPHONE HYDROCHLORIDE 1 MG/ML
0.2 INJECTION, SOLUTION INTRAMUSCULAR; INTRAVENOUS; SUBCUTANEOUS EVERY 5 MIN PRN
Status: DISCONTINUED | OUTPATIENT
Start: 2024-05-28 | End: 2024-05-28 | Stop reason: HOSPADM

## 2024-05-28 RX ORDER — HYDROMORPHONE HYDROCHLORIDE 1 MG/ML
0.6 INJECTION, SOLUTION INTRAMUSCULAR; INTRAVENOUS; SUBCUTANEOUS EVERY 5 MIN PRN
Status: DISCONTINUED | OUTPATIENT
Start: 2024-05-28 | End: 2024-05-28 | Stop reason: HOSPADM

## 2024-05-28 RX ORDER — HYDROMORPHONE HYDROCHLORIDE 1 MG/ML
0.4 INJECTION, SOLUTION INTRAMUSCULAR; INTRAVENOUS; SUBCUTANEOUS EVERY 5 MIN PRN
Status: DISCONTINUED | OUTPATIENT
Start: 2024-05-28 | End: 2024-05-28 | Stop reason: HOSPADM

## 2024-05-28 RX ORDER — SODIUM CHLORIDE, SODIUM LACTATE, POTASSIUM CHLORIDE, CALCIUM CHLORIDE 600; 310; 30; 20 MG/100ML; MG/100ML; MG/100ML; MG/100ML
INJECTION, SOLUTION INTRAVENOUS CONTINUOUS
Status: DISCONTINUED | OUTPATIENT
Start: 2024-05-28 | End: 2024-05-28 | Stop reason: HOSPADM

## 2024-05-28 RX ORDER — ACETAMINOPHEN 500 MG
1000 TABLET ORAL ONCE AS NEEDED
Status: DISCONTINUED | OUTPATIENT
Start: 2024-05-28 | End: 2024-05-28 | Stop reason: HOSPADM

## 2024-05-28 RX ORDER — LABETALOL HYDROCHLORIDE 5 MG/ML
5 INJECTION, SOLUTION INTRAVENOUS EVERY 5 MIN PRN
Status: DISCONTINUED | OUTPATIENT
Start: 2024-05-28 | End: 2024-05-28 | Stop reason: HOSPADM

## 2024-05-28 RX ADMIN — SODIUM CHLORIDE: 9 INJECTION, SOLUTION INTRAVENOUS at 17:19:00

## 2024-05-28 NOTE — CONSULTS
[facility]   NEUROLOGY   CONSULT NOTE    Admission date: 5/26/2024  Reason for Consult: Fall/stroke.  Chief Complaint:   Chief Complaint   Patient presents with    Fall   ________________________________________________________________    History     History of Presenting Illness  89 year old female with coronary disease, hypertension, hyperlipidemia consulted for acute stroke.  Patient was initially admitted after a fall and was evaluated by orthopedics for compression fracture of the spine.  Patient was noted to be more confused and disoriented and a CT scan of the head was performed which reported subacute infarct in the right frontoparietal area with hemorrhagic conversion.  MRI performed reported hemorrhagic infarct in the right parietal lobe measuring 4.3 x 3.5 cm.  Also reported separate punctate acute infarcts within the parasagittal frontal lobes.  Patient is currently awake, responsive, answering questions denies to be in any significant pain.  No witnessed episode of seizure noted daughter has noted patient to be intermittently confused.      Source of history was patient's daughter and chart review.      Past Medical History:    Coronary atherosclerosis    Essential hypertension    High blood pressure    High cholesterol     History reviewed. No pertinent surgical history.  Social History     Socioeconomic History    Marital status:    Tobacco Use    Smoking status: Never    Smokeless tobacco: Never   Vaping Use    Vaping status: Never Used   Substance and Sexual Activity    Alcohol use: Not Currently    Drug use: Never     Social Determinants of Health     Food Insecurity: No Food Insecurity (5/26/2024)    Food Insecurity     Food Insecurity: Never true   Transportation Needs: No Transportation Needs (5/26/2024)    Transportation Needs     Lack of Transportation: No   Physical Activity: Inactive (2/25/2021)    Received from [employer], [facility]     Exercise Vital Sign     Days of Exercise per Week: 0 days     Minutes of Exercise per Session: 0 min   Housing Stability: Low Risk  (5/26/2024)    Housing Stability     Housing Instability: No     No family history on file.  Allergies   Allergies   Allergen Reactions    Aspirin OTHER (SEE COMMENTS)     Stomach upset       Home Meds  Current Outpatient Medications   Medication Instructions    aspirin 81 mg, Oral, Daily    atorvastatin (LIPITOR) 20 mg, Oral, Nightly    Calcium Carb-Cholecalciferol (CALCIUM CARBONATE-VITAMIN D) 250-125 MG-UNIT Oral Tab 2 tablets, Oral, 3 times daily with meals    carvedilol (COREG) 3.125 mg, Oral, 2 times daily with meals    celecoxib (CELEBREX) 200 mg, Oral, 2 times daily    docusate sodium (COLACE) 100 mg, Oral, 2 times daily PRN    isosorbide mononitrate ER (IMDUR) 30 mg, Oral, Daily    lisinopril (ZESTRIL) 10 mg, Oral, Daily    metoprolol succinate ER (TOPROL XL) 25 mg, Oral, Daily Beta Blocker    Sennosides 17.2 mg, Oral, Nightly     Scheduled Meds:   nystatin  5 mL Oral QID    isosorbide mononitrate ER  30 mg Oral Daily    calcium carbonate-vitamin D  2 tablet Oral TID CC    sennosides  17.2 mg Oral Nightly    atorvastatin  20 mg Oral Nightly    metoprolol succinate ER  25 mg Oral Daily Beta Blocker    acetaminophen  1,000 mg Oral TID    Or    HYDROcodone-acetaminophen  1 tablet Oral TID    Or    HYDROcodone-acetaminophen  2 tablet Oral TID     Continuous Infusions:   sodium chloride 75 mL/hr at 05/27/24 1842     PRN Meds:  acetaminophen **OR** acetaminophen    labetalol    hydrALAzine    melatonin    ondansetron    acetaminophen    polyethylene glycol (PEG 3350)    sennosides    bisacodyl    fleet enema    morphINE **OR** morphINE **OR** morphINE    OBJECTIVE   VITAL SIGNS:   Temp:  [97.5 °F (36.4 °C)-98.6 °F (37 °C)] 97.9 °F (36.6 °C)  Pulse:  [66-92] 73  Resp:  [15-24] 18  BP: ()/(65-90) 136/90  SpO2:  [94 %-99 %] 99 %    PHYSICAL EXAM:    NEUROLOGIC:    Mental Status:  Awake, alert, answering questions, following simple commands, no aphasia or dysarthria is noted.    Cranial nerves: PERRL.  Mild left-sided neglect with left visual field defect.  EOMI. mild left nasolabial fold flattening, Hearing grossly decreased. Tongue midline with normal movements.   Motor: Left plantar drift with mild weakness in left upper extremity noted.  The rest of the extremities had normal strength.    Sensation: Intact to light touch bilaterally  Cerebellar: Normal Finger-To-Nose test      LABORATORY DATA:  Last 24 hour labs were reviewed in detail.  Recent Labs   Lab 05/27/24 0409 05/28/24  0752     --    K 3.8 4.2     --    CO2 25.0  --    *  --    BUN 30*  --    CREATSERUM 0.94  --      Recent Labs   Lab 05/27/24 0409   WBC 11.5*   HGB 14.5   .0*     No results for input(s): \"TOTALPROTEIN\", \"ALBUMIN\", \"ALT\", \"AST\" in the last 168 hours.    Invalid input(s): \"TOTALBILIRUBIN\", \"ALKPHOSPHATE\"  No results for input(s): \"MG\", \"PHOS\" in the last 168 hours.  Last A1c value was  % done  .           Radiology:    MRI BRAIN MRA BRAIN MRA NECK (CPT=70551/70260/74592)    Result Date: 5/28/2024  CONCLUSION:  See above.   Findings discussed with the patient's nurse Ms. Ray at 0210 hours on 5/28/2024.   LOCATION:  [facility]   Dictated by (CST): Stromberg, LeRoy, MD on 5/28/2024 at 2:03 AM     Finalized by (CST): Stromberg, LeRoy, MD on 5/28/2024 at 2:12 AM      ASSESSMENT/PLAN   89 year old female with:    Left parietal hemorrhagic infarct.  Patient currently off antiplatelet medications.  MRA of head and neck pending.  Will also start patient on Keppra 500 mg twice daily.  Advised OT/PT/rehab eval.  Systolic blood pressure target 110-1 50.  Will request neurosurgery input.  Encephalopathy.  As mentioned above we will start patient on Keppra for seizure prophylaxis which could be contributing.  Will check EEG, ammonia level and TSH.  OT/PT/rehab eval for neglect, visual field cut.  Fall  precaution and seizure precaution advised..  Mild to moderate dementia.  Compression fracture of the spine.  Orthopedic/spine for further management.  Situation discussed with patient's daughter.    Principal Problem:    Closed compression fracture of L2 vertebra, initial encounter ([facility])  Active Problems:    Inability to ambulate due to multiple joints    Intractable back pain    WESLEY (acute kidney injury) ([facility])    Cerebrovascular accident (CVA) ([facility])       Rich Ruvalcaba MD  Neurohospitalist  [facility]    Disclaimer: This record was dictated using Dragon software. There may be errors due to voice recognition problems that were not realized and corrected during the completion of the note.

## 2024-05-28 NOTE — ANESTHESIA PREPROCEDURE EVALUATION
PRE-OP EVALUATION    Patient Name: Tierra Anderson    Admit Diagnosis: Unable to ambulate [R26.2]  Closed compression fracture of L2 vertebra, initial encounter ([facility]) [S32.020A]    Pre-op Diagnosis: * No surgery found *        Anesthesia Procedure: MRI BRAIN MRA BRAIN MRA NECK (CPT=70551/80032/69618)    * Surgery not found *    Pre-op vitals reviewed.  Temp: 97.9 °F (36.6 °C)  Pulse: 73  Resp: 18  BP: 136/90  SpO2: 99 %  Body mass index is 19.17 kg/m².    Current medications reviewed.  Hospital Medications:   nystatin (Mycostatin) 956827 UNIT/ML oral suspension 500,000 Units  5 mL Oral QID    isosorbide mononitrate ER (Imdur) 24 hr tab 30 mg  30 mg Oral Daily    sodium chloride 0.9% infusion   Intravenous Continuous    acetaminophen (Tylenol) tab 650 mg  650 mg Oral Q4H PRN    Or    acetaminophen (Tylenol) rectal suppository 650 mg  650 mg Rectal Q4H PRN    labetalol (Trandate) 5 mg/mL injection 10 mg  10 mg Intravenous Q10 Min PRN    hydrALAzine (Apresoline) 20 mg/mL injection 10 mg  10 mg Intravenous Q2H PRN    [COMPLETED] potassium chloride 20 mEq/100mL IVPB premix 20 mEq  20 mEq Intravenous Once    [] HYDROmorphone (Dilaudid) 1 MG/ML injection 0.5 mg  0.5 mg Intravenous Q30 Min PRN    [] ondansetron (Zofran) 4 MG/2ML injection 4 mg  4 mg Intravenous Q4H PRN    calcium carbonate-vitamin D (Oyster Shell-D) 250-3.125 MG-MCG per tab 2 tablet  2 tablet Oral TID CC    sennosides (Senokot) tab 17.2 mg  17.2 mg Oral Nightly    atorvastatin (Lipitor) tab 20 mg  20 mg Oral Nightly    metoprolol succinate ER (Toprol XL) 24 hr tab 25 mg  25 mg Oral Daily Beta Blocker    melatonin tab 3 mg  3 mg Oral Nightly PRN    ondansetron (Zofran) 4 MG/2ML injection 4 mg  4 mg Intravenous Q6H PRN    [] lactated ringers infusion   Intravenous Continuous    acetaminophen (Tylenol Extra Strength) tab 500 mg  500 mg Oral Q4H PRN    acetaminophen (Tylenol Extra Strength) tab 1,000 mg  1,000 mg Oral TID    Or     HYDROcodone-acetaminophen (Norco) 5-325 MG per tab 1 tablet  1 tablet Oral TID    Or    HYDROcodone-acetaminophen (Norco) 5-325 MG per tab 2 tablet  2 tablet Oral TID    polyethylene glycol (PEG 3350) (Miralax) 17 g oral packet 17 g  17 g Oral Daily PRN    sennosides (Senokot) tab 17.2 mg  17.2 mg Oral Nightly PRN    bisacodyl (Dulcolax) 10 MG rectal suppository 10 mg  10 mg Rectal Daily PRN    fleet enema (Fleet) 7-19 GM/118ML rectal enema 133 mL  1 enema Rectal Once PRN    [COMPLETED] morphINE PF 2 MG/ML injection 2 mg  2 mg Intravenous Once    morphINE PF 2 MG/ML injection 0.5 mg  0.5 mg Intravenous Q2H PRN    Or    morphINE PF 2 MG/ML injection 1 mg  1 mg Intravenous Q2H PRN    Or    morphINE PF 2 MG/ML injection 2 mg  2 mg Intravenous Q2H PRN       Outpatient Medications:     Medications Prior to Admission   Medication Sig Dispense Refill Last Dose    carvedilol 3.125 MG Oral Tab Take 1 tablet (3.125 mg total) by mouth 2 (two) times daily with meals.   5/25/2024    isosorbide mononitrate ER 30 MG Oral Tablet 24 Hr Take 1 tablet (30 mg total) by mouth daily.   5/25/2024    atorvastatin 20 MG Oral Tab Take 1 tablet (20 mg total) by mouth nightly. 30 tablet 0     aspirin 81 MG Oral Chew Tab Chew 1 tablet (81 mg total) by mouth daily. 30 tablet 0     lisinopril 10 MG Oral Tab Take 1 tablet (10 mg total) by mouth daily. 30 tablet 0     metoprolol succinate ER 25 MG Oral Tablet 24 Hr Take 1 tablet (25 mg total) by mouth Daily Beta Blocker. 30 tablet 0     Calcium Carb-Cholecalciferol (CALCIUM CARBONATE-VITAMIN D) 250-125 MG-UNIT Oral Tab Take 2 tablets by mouth 3 (three) times daily with meals. 100 tablet 0     docusate sodium 100 MG Oral Cap Take 100 mg by mouth 2 (two) times daily as needed for constipation. 60 capsule 0     Senna 17.2 MG Oral Tab Take 1 tablet (17.2 mg total) by mouth nightly. 30 tablet 0     celecoxib 200 MG Oral Cap Take 1 capsule (200 mg total) by mouth 2 (two) times daily.          Allergies:  Aspirin      Anesthesia Evaluation    Patient summary reviewed.    Anesthetic Complications  (-) history of anesthetic complications         GI/Hepatic/Renal                                 Cardiovascular                  (+) hypertension     (+) CAD                                Endo/Other                                  Pulmonary                           Neuro/Psych          (+) CVA                    WESLEY (acute kidney injury) (HCC) Azotemia  Bradycardia CAD (coronary artery disease)  COVID-19 Cerebrovascular accident (CVA) (HCC)  Closed compression fracture of L2 vertebra, initial encounter (HCC) Closed left hip fracture, initial encounter (HCC)  Elevated troponin Encephalopathy  Essential hypertension Fall from standing, initial encounter  Hemorrhagic infarction involving posterior cerebral circulation of right side (HCC) Hyperlipidemia  Hyponatremia Inability to ambulate due to multiple joints  Intractable back pain Left bundle branch block  Leukocytosis Metabolic acidosis  PVC (premature ventricular contraction) Thrombocytopenia (HCC)  Weakness             History reviewed. No pertinent surgical history.  Social History     Socioeconomic History    Marital status:    Tobacco Use    Smoking status: Never    Smokeless tobacco: Never   Vaping Use    Vaping status: Never Used   Substance and Sexual Activity    Alcohol use: Not Currently    Drug use: Never     History   Drug Use Unknown     Available pre-op labs reviewed.  Lab Results   Component Value Date    WBC 11.5 (H) 05/27/2024    RBC 4.87 05/27/2024    HGB 14.5 05/27/2024    HCT 44.0 05/27/2024    MCV 90.3 05/27/2024    MCH 29.8 05/27/2024    MCHC 33.0 05/27/2024    RDW 14.7 05/27/2024    .0 (L) 05/27/2024     Lab Results   Component Value Date     05/27/2024    K 4.2 05/28/2024     05/27/2024    CO2 25.0 05/27/2024    BUN 30 (H) 05/27/2024    CREATSERUM 0.94 05/27/2024     (H) 05/27/2024    CA 9.1 05/27/2024             Airway      Mallampati: I  Mouth opening: >3 FB    Neck ROM: limited Cardiovascular    Cardiovascular exam normal.         Dental    Dentition appears grossly intact         Pulmonary    Pulmonary exam normal.                 Other findings              ASA: 3   Plan: general  NPO status verified and           Plan/risks discussed with: patient                Present on Admission:  **None**

## 2024-05-28 NOTE — PROGRESS NOTES
[facility]   part of [facility]     Hospitalist Progress Note     Tierra Anderson Patient Status:  Observation    1927 MRN [id number]   Location [facility] 3SW-A Attending Kathy Leal, DO   Hosp Day # 0 PCP RAMON WU     Chief Complaint: back pain    Subjective:     More confused this morning, but knows she's in the hospital and states \"I don't like it here and I'm never coming back!'. Moving all extremities spontaneously, but difficulty following commands    Objective:    Review of Systems:   A comprehensive review of systems was completed; pertinent positive and negatives stated in subjective.    Vital signs:  Temp:  [97.5 °F (36.4 °C)-98.6 °F (37 °C)] 97.9 °F (36.6 °C)  Pulse:  [66-92] 73  Resp:  [15-24] 18  BP: ()/(65-90) 136/90  SpO2:  [94 %-99 %] 99 %    Physical Exam:    General: No acute distress, frail/elderly appearing, confused  Respiratory: No wheezes, no rhonchi  Cardiovascular: S1, S2, regular rate and rhythm  Abdomen: Soft, Non-tender, non-distended, positive bowel sounds  Neuro: No new focal deficits.   Extremities: No edema    Diagnostic Data:    Labs:  Recent Labs   Lab 24  0409   WBC 11.5*   HGB 14.5   MCV 90.3   .0*       Recent Labs   Lab 24  0409 24  0752   *  --    BUN 30*  --    CREATSERUM 0.94  --    CA 9.1  --      --    K 3.8 4.2     --    CO2 25.0  --        Estimated Creatinine Clearance: 27.1 mL/min (based on SCr of 0.94 mg/dL).    No results for input(s): \"TROP\", \"TROPHS\", \"CK\" in the last 168 hours.    No results for input(s): \"PTP\", \"INR\" in the last 168 hours.     Microbiology    No results found for this visit on 24.      Imaging: Reviewed in Epic.    Medications:    nystatin  5 mL Oral QID    isosorbide mononitrate ER  30 mg Oral Daily    calcium carbonate-vitamin D  2 tablet Oral TID CC    sennosides  17.2 mg Oral Nightly    atorvastatin  20 mg Oral Nightly    metoprolol succinate ER  25  mg Oral Daily Beta Blocker    acetaminophen  1,000 mg Oral TID    Or    HYDROcodone-acetaminophen  1 tablet Oral TID    Or    HYDROcodone-acetaminophen  2 tablet Oral TID       Assessment & Plan:      #Sub-acute R frontoparietal lobe infarct with possible hemorrhagic conversion  - CTH reviewed  - MRI/MRA brain non-diagnostic > repeat MR imaging ordered with anesthesia  - stroke protocol  - echo ordered  - heparin ppx/aspirin discontinued  - neurology consult placed  - PT/OT/ST    #Intractable back pain  #Mechanical fall  #Inability to ambulate  #L2 compression fracture  - pain control, anti-emetics PRN  - PT/OT     #Acute kidney injury  - improving with gentle IVF  - hold NSAIDs     #Hyperglycemia, suspect reactive     #HTN  - metoprolol  - imdur     #HLD  - statin    #CAD  - hold aspirin d/t hemorrhagic conversion CVA  - statin         Kathy Leal,     Supplementary Documentation:     Quality:  DVT Mechanical Prophylaxis:   SCDs,    DVT Pharmacologic Prophylaxis   Medication   None                Code Status: Full Code  Gibbons: External urinary catheter in place  Gibbons Duration (in days):   Central line:    GISSELL:     Discharge is dependent on: placement, medically ready for discharge  At this point Ms. Anderson is expected to be discharge to: [facility]    The 21st Century Cures Act makes medical notes like these available to patients in the interest of transparency. Please be advised this is a medical document. Medical documents are intended to carry relevant information, facts as evident, and the clinical opinion of the practitioner. The medical note is intended as peer to peer communication and may appear blunt or direct. It is written in medical language and may contain abbreviations or verbiage that are unfamiliar.

## 2024-05-28 NOTE — ANESTHESIA POSTPROCEDURE EVALUATION
[facility] Patient Status:  Inpatient   Age/Gender 89 year old female MRN [id number]   Location [facility] 3NE-A Attending Kathy Leal DO   Hosp Day # 0 PCP RAMON WU       Anesthesia Post-op Note        Procedure Summary       Date: 05/28/24 Room / Location: [facility] MRI    Anesthesia Start: 1607 Anesthesia Stop: 1719    Procedure: MRI BRAIN MRA BRAIN MRA NECK (CPT=70551/67219/43546) Diagnosis: (Hemorrhagic stroke)    Scheduled Providers:  Anesthesiologist: Greg Sterling MD    Anesthesia Type: general ASA Status: 3            Anesthesia Type: general    Vitals Value Taken Time   /77 05/28/24 1719   Temp 97.7 °F (36.5 °C) 05/28/24 1719   Pulse 74 05/28/24 1719   Resp 16 05/28/24 1719   SpO2 96 05/28/24 1720       Patient Location: PACU    Anesthesia Type: general    Airway Patency: patent    Postop Pain Control: adequate    Mental Status: mildly sedated but able to meaningfully participate in the post-anesthesia evaluation    Nausea/Vomiting: none    Cardiopulmonary/Hydration status: stable euvolemic    Complications: no apparent anesthesia related complications    Postop vital signs: stable    Patient to be discharged from PACU when criteria met.

## 2024-05-28 NOTE — CONGREGATE LIVING REVIEW
visited with patient's family after receiving spiritual care consult. Patient's wife and sister were at bedside.  listened supportively as patient's wife shared patient's health journey.  made family aware of the ongoing availability of pastoral support and will continue to follow up as able.   [facility] Authorization    The [facility] Review Committee has reviewed this case and the patient IS APPROVED for discharge to a facility for Short Term Skilled once the following procedure is followed:     - The physician discharge instructions (contained within the YESIS note for SNF) must inlcude the below appropriate and approved COVID instructions to the facility    For questions regarding CLRC approval process, please contact the CM assigned to the case.  For questions regarding RN discharge workflow, please contact the unit Clinical Leader.

## 2024-05-28 NOTE — ANESTHESIA PROCEDURE NOTES
Airway  Date/Time: 5/28/2024 4:14 PM  Urgency: elective      General Information and Staff    Patient location during procedure: OR  Anesthesiologist: Greg Sterling MD  Performed: anesthesiologist   Performed by: Greg Sterling MD  Authorized by: Greg Sterling MD      Indications and Patient Condition  Indications for airway management: anesthesia  Sedation level: deep  Preoxygenated: yes  Patient position: sniffing  Mask difficulty assessment: 1 - vent by mask    Final Airway Details  Final airway type: supraglottic airway      Successful airway: classic  Size 3       Number of attempts at approach: 1

## 2024-05-28 NOTE — PHYSICAL THERAPY NOTE
PHYSICAL THERAPY TREATMENT NOTE - INPATIENT    Room Number: 3616/3616-A     Session: 1     Number of Visits to Meet Established Goals: 5    Presenting Problem: s/p fall off couch  Co-Morbidities : CAD, HTN, scoliosis (per xray L spine)    PHYSICAL THERAPY MEDICAL/SOCIAL HISTORY  History related to current admission: Patient is a 89 year old female admitted on 5/26/2024 from home for fall off couch with c/o back pain.  Pt diagnosed with L2 compression fracture.     HOME SITUATION  Type of Home: House   Home Layout: One level  Stairs to Enter : 2     Lives With: Alone  Drives: Yes  Patient Owned Equipment: Rolling walker  Patient Regularly Uses: Reading glasses     Prior Level of [place]:   Pt questionable historian- reported she lived in house with her spouse, ambulates with rollator, unable to tell much more.  Writer called daughter (Itzel)- pt lives in [facility], stays on main floor of home. Lives alone, but dtr (Itzel) visits everyday. Supposed to ambulate with RW at all times. Up until now she was doing everything on her own- sponge bathing, dressing, etc.   Drives- just passed her drivers test and renewed her license.   Does laundry one in a while, otherwise dtr does it for her. Daughter brings over meals each day.   Writes her own checks for her bills.   Had recent admission at [facility] for \"mild heart attack\" (per dtr) and had a change in meds.    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   XR LUMBAR SPINE:   There is mild to moderate levoscoliosis of the lumbar spine.  There is no acute fracture dislocation.  There is diffuse osteopenia.  There is moderate degenerative disc disease throughout the lumbar spine most pronounced at L3-4.  There is mild to   moderate facet joint degenerative changes on the right greater than left at L3-4 L4-5 and L5-S1.  There is mild depression of the superior endplate of L2 which is an age  indeterminate compression fracture.     5/27 MRI Brain    FINDINGS:       Examination is essentially nondiagnostic.  There is a hemorrhagic infarct within the right parietal lobe as noted on the head CT from earlier on 5/27/2024.  This measures 4.3 x 3.5 cm in the axial plane.      There are separate punctate acute infarcts within the parasagittal frontal lobes.      There is moderate prominence of the lateral ventricles.      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  A component this finding could be developmental.      Chronic sphenoid sinusitis.       ASSESSMENT   Patient demonstrates fair progress this session, goals  remain in progress.    Patient continues to function near baseline with bed mobility, transfers, and gait.  Contributing factors to remaining limitations include decreased endurance/aerobic capacity, pain, impaired Standing and sitting balance, impaired coordination, impaired motor planning, decreased muscular endurance, and cognitive deficits ( ).  Next session anticipate patient to progress bed mobility, transfers, and gait.  Physical Therapy will continue to follow patient for duration of hospitalization.    Patient continues to benefit from continued skilled PT services: to facilitate return to prior level of function as patient demonstrates high motivation with excellent tolerance to an intensive therapy program .  At this time pt was initially recommended gradual rehab however pt demonstrated improved with mobility and function as well as a new diagnosis of Acute CVA. Pt would greatly benefit with intense rehab due to pt has goals to discharge to community. Pt has strong family support and is able to tolerate 3hrs of therapy.   PLAN  PT Treatment Plan: Bed mobility;Body mechanics;Coordination;Endurance;Energy conservation;Patient education;Family education;Gait training;Range of motion;Neuromuscular re-educate;Strengthening;Stoop training;Stair training;Transfer training;Balance  training  Rehab Potential : Fair  Frequency (Obs): 3-5x/week    CURRENT GOALS     Goal #1 Patient is able to demonstrate supine - sit EOB @ level: supervision      Goal #2 Patient is able to demonstrate transfers Sit to/from Stand at assistance level: supervision      Goal #3 Patient is able to ambulate 50 feet with assist device: walker - rolling at assistance level: minimum assistance      Goal #4     Goal #5     Goal #6     Goal Comments: Goals established on 2024 all goals ongoing    SUBJECTIVE  \"Ill try\"    OBJECTIVE  Precautions: Bed/chair alarm;Spine    WEIGHT BEARING RESTRICTION  Weight Bearing Restriction: None                PAIN ASSESSMENT   Ratin  Location: Pt reports no pain       BALANCE                                                                                                                       Static Sitting: Fair -  Dynamic Sitting: Poor +           Static Standing: Poor  Dynamic Standing: Poor    ACTIVITY TOLERANCE                         O2 WALK         AM-PAC '6-Clicks' INPATIENT SHORT FORM - BASIC MOBILITY  How much difficulty does the patient currently have...  Patient Difficulty: Turning over in bed (including adjusting bedclothes, sheets and blankets)?: A Lot   Patient Difficulty: Sitting down on and standing up from a chair with arms (e.g., wheelchair, bedside commode, etc.): A Lot   Patient Difficulty: Moving from lying on back to sitting on the side of the bed?: A Lot   How much help from another person does the patient currently need...   Help from Another: Moving to and from a bed to a chair (including a wheelchair)?: A Lot   Help from Another: Need to walk in hospital room?: A Lot   Help from Another: Climbing 3-5 steps with a railing?: Total       AM-PAC Score:  Raw Score: 11   Approx Degree of Impairment: 72.57%   Standardized Score (AM-PAC Scale): 33.86   CMS Modifier (G-Code): CL    FUNCTIONAL ABILITY STATUS  Gait Assessment   Functional Mobility/Gait  Assessment  Gait Assistance: Not tested    Skilled Therapy Provided    Bed Mobility:  Rolling: NT   Supine<>Sit: Mod A. HOB elevated pt was able to initiate activity. Required increase assistance with trunk   Sit<>Supine: Max A     Transfer Mobility:  Sit<>Stand: Mod A x 2. From bed to RW. Pt required to place hands on RW to assist to pull  up  Stand<>Sit: Max A   Gait: NT    Therapist's Comments: Pt initially required Min A to maintain sitting upright however pt was able to demonstrate CGA. While sitting pt demonstrated L side inattention that required constant cues to identify objects. Pt also demonstrated chorea like movements on the LLE that that decrease pt's motor planning and coordination. Pt required hand to hand guidance for proper L UE limb placement.  While standing pt had difficulty WB on the LLE preventing pt from side stepping towards her L side with the use of the RW.  PT required Mod A while standing standing         Patient End of Session: In bed;Needs met;Call light within reach;RN aware of session/findings;All patient questions and concerns addressed;Family present;Alarm set    PT Session Time: 23 minutes  Therapeutic Activity: 23 minutes

## 2024-05-28 NOTE — PLAN OF CARE
Assumed pt care at 1930  Aox self, RA, VSS  Tele-NSR  Neuro q2 until 5/28 0000; Neuro q4 starting 5/28 0400  [facility] daily  Seizure and Aspiration precautions  Regular diet  Incontinent-purewick and pad applied  Daily weight  Cardiac electrolyte protocol  Elevate HOB >30 degrees  Ambulatory-assst x1-2 with walker  Safety precautions in place  Bed in lowest position and call light within reach  Updated with plan of care  All needs met at this time  Will continue plan of care    Unable to complete MRI d/t agitation. Radiology recommending Anesthesia to complete MRI.      Problem: NEUROLOGICAL - ADULT  Goal: Achieves stable or improved neurological status  Description: INTERVENTIONS  - Assess for and report changes in neurological status  - Initiate measures to prevent increased intracranial pressure  - Maintain blood pressure and fluid volume within ordered parameters to optimize cerebral perfusion and minimize risk of hemorrhage  - Monitor temperature, glucose, and sodium. Initiate appropriate interventions as ordered  Outcome: Progressing  Goal: Achieves maximal functionality and self care  Description: INTERVENTIONS  - Monitor swallowing and airway patency with patient fatigue and changes in neurological status  - Encourage and assist patient to increase activity and self care with guidance from PT/OT  - Encourage visually impaired, hearing impaired and aphasic patients to use assistive/communication devices  Outcome: Progressing     Problem: SAFETY ADULT - FALL  Goal: Free from fall injury  Description: INTERVENTIONS:  - Assess pt frequently for physical needs  - Identify cognitive and physical deficits and behaviors that affect risk of falls.  - Gordon fall precautions as indicated by assessment.  - Educate pt/family on patient safety including physical limitations  - Instruct pt to call for assistance with activity based on assessment  - Modify environment to reduce risk of injury  - Provide assistive devices as  appropriate  - Consider OT/PT consult to assist with strengthening/mobility  - Encourage toileting schedule  Outcome: Progressing     Problem: PAIN - ADULT  Goal: Verbalizes/displays adequate comfort level or patient's stated pain goal  Description: INTERVENTIONS:  - Encourage pt to monitor pain and request assistance  - Assess pain using appropriate pain scale  - Administer analgesics based on type and severity of pain and evaluate response  - Implement non-pharmacological measures as appropriate and evaluate response  - Consider cultural and social influences on pain and pain management  - Manage/alleviate anxiety  - Utilize distraction and/or relaxation techniques  - Monitor for opioid side effects  - Notify MD/LIP if interventions unsuccessful or patient reports new pain  - Anticipate increased pain with activity and pre-medicate as appropriate  Outcome: Progressing     Problem: SKIN/TISSUE INTEGRITY - ADULT  Goal: Skin integrity remains intact  Description: INTERVENTIONS  - Assess and document risk factors for pressure ulcer development  - Assess and document skin integrity  - Monitor for areas of redness and/or skin breakdown  - Initiate interventions, skin care algorithm/standards of care as needed  Outcome: Progressing     Problem: MUSCULOSKELETAL - ADULT  Goal: Maintain proper alignment of affected body part  Description: INTERVENTIONS:  - Support and protect limb and body alignment per provider's orders  - Instruct and reinforce with patient and family use of appropriate assistive device and precautions (e.g. spinal or hip dislocation precautions)  Outcome: Progressing     Problem: Impaired Cognition  Goal: Patient will exhibit improved attention, thought processing and/or memory  Description: Interventions:  - Minimize distractions in the room when full attention is required  - Allow additional time for processing after asking questions or providing instructions  - Provide stimulation to the neglected  side by encouraging family to sit/stand on the inattentive side during conversation  Outcome: Progressing

## 2024-05-28 NOTE — CONSULTS
[facility]  JINA Neurosurgery Consult    Tierra Anderson Patient Status:  Inpatient    1927 MRN [id number]   Location [facility] 3NE-A Attending Kathy Leal, DO   Hosp Day # 0 PCP RAMON WU     REASON FOR CONSULTATION:  Closed compression fracture of L2 vertebra, initial encounter (HCC)  Right frontoparietal infarct with possible hemorrhagic conversion    HISTORY OF PRESENT ILLNESS     Tierra Anderson is a pleasant 89 year old female with PMH of HTN, HLD, cardiomyopathy, bilateral carotid disease, and CAD s/p stent who presented to ED following a witnessed fall at her daughter's home, where she resides. Pt was reportedly able to stand without assistance but reported severe back pain following the fall thus her friend brought her to ED. XR lumbar spine revealed a mild age-indeterminate compression fracture of the superior endplate of L2. CT head completed on evening of  in setting of increased confusion shows an acute-subacute infarct within the right frontoparietal lobe with hemorrhagic conversion. Neurosurgery is consulted for this. Subsequent MRI/MRA brain is limited d/t significant motion artifact and is ordered for repeat. On exam, patient is drowsy but arousable. She is confused. She has no complaints. Of note, pt was recently admitted to [facility] for abnormal cardiac enzymes - hospital notes are unavailable for review. She does take baby Aspirin daily. At this time, pt denies headache, dizziness, visual disturbance, changes in speech, numbness, paresthesias, or focal weakness.    PAST MEDICAL HISTORY     Past Medical History:    Coronary atherosclerosis    Essential hypertension    High blood pressure    High cholesterol     PAST SURGICAL HISTORY:  History reviewed. No pertinent surgical history.    FAMILY HISTORY:  family history is not on file.    SOCIAL HISTORY:   reports that she has never smoked. She has never used smokeless tobacco. She reports that she does not  currently use alcohol. She reports that she does not use drugs.    ALLERGIES     Allergies   Allergen Reactions    Aspirin OTHER (SEE COMMENTS)     Stomach upset     MEDICATIONS     Medications Prior to Admission   Medication Sig Dispense Refill Last Dose    carvedilol 3.125 MG Oral Tab Take 1 tablet (3.125 mg total) by mouth 2 (two) times daily with meals.   5/25/2024    isosorbide mononitrate ER 30 MG Oral Tablet 24 Hr Take 1 tablet (30 mg total) by mouth daily.   5/25/2024    atorvastatin 20 MG Oral Tab Take 1 tablet (20 mg total) by mouth nightly. 30 tablet 0     aspirin 81 MG Oral Chew Tab Chew 1 tablet (81 mg total) by mouth daily. 30 tablet 0     lisinopril 10 MG Oral Tab Take 1 tablet (10 mg total) by mouth daily. 30 tablet 0     metoprolol succinate ER 25 MG Oral Tablet 24 Hr Take 1 tablet (25 mg total) by mouth Daily Beta Blocker. 30 tablet 0     Calcium Carb-Cholecalciferol (CALCIUM CARBONATE-VITAMIN D) 250-125 MG-UNIT Oral Tab Take 2 tablets by mouth 3 (three) times daily with meals. 100 tablet 0     docusate sodium 100 MG Oral Cap Take 100 mg by mouth 2 (two) times daily as needed for constipation. 60 capsule 0     Senna 17.2 MG Oral Tab Take 1 tablet (17.2 mg total) by mouth nightly. 30 tablet 0     celecoxib 200 MG Oral Cap Take 1 capsule (200 mg total) by mouth 2 (two) times daily.        Current Facility-Administered Medications   Medication Dose Route Frequency    nystatin (Mycostatin) 775958 UNIT/ML oral suspension 500,000 Units  5 mL Oral QID    isosorbide mononitrate ER (Imdur) 24 hr tab 30 mg  30 mg Oral Daily    sodium chloride 0.9% infusion   Intravenous Continuous    acetaminophen (Tylenol) tab 650 mg  650 mg Oral Q4H PRN    Or    acetaminophen (Tylenol) rectal suppository 650 mg  650 mg Rectal Q4H PRN    labetalol (Trandate) 5 mg/mL injection 10 mg  10 mg Intravenous Q10 Min PRN    hydrALAzine (Apresoline) 20 mg/mL injection 10 mg  10 mg Intravenous Q2H PRN    calcium carbonate-vitamin D  (Oyster Shell-D) 250-3.125 MG-MCG per tab 2 tablet  2 tablet Oral TID CC    sennosides (Senokot) tab 17.2 mg  17.2 mg Oral Nightly    atorvastatin (Lipitor) tab 20 mg  20 mg Oral Nightly    metoprolol succinate ER (Toprol XL) 24 hr tab 25 mg  25 mg Oral Daily Beta Blocker    melatonin tab 3 mg  3 mg Oral Nightly PRN    ondansetron (Zofran) 4 MG/2ML injection 4 mg  4 mg Intravenous Q6H PRN    acetaminophen (Tylenol Extra Strength) tab 500 mg  500 mg Oral Q4H PRN    acetaminophen (Tylenol Extra Strength) tab 1,000 mg  1,000 mg Oral TID    Or    HYDROcodone-acetaminophen (Norco) 5-325 MG per tab 1 tablet  1 tablet Oral TID    Or    HYDROcodone-acetaminophen (Norco) 5-325 MG per tab 2 tablet  2 tablet Oral TID    polyethylene glycol (PEG 3350) (Miralax) 17 g oral packet 17 g  17 g Oral Daily PRN    sennosides (Senokot) tab 17.2 mg  17.2 mg Oral Nightly PRN    bisacodyl (Dulcolax) 10 MG rectal suppository 10 mg  10 mg Rectal Daily PRN    fleet enema (Fleet) 7-19 GM/118ML rectal enema 133 mL  1 enema Rectal Once PRN    morphINE PF 2 MG/ML injection 0.5 mg  0.5 mg Intravenous Q2H PRN    Or    morphINE PF 2 MG/ML injection 1 mg  1 mg Intravenous Q2H PRN    Or    morphINE PF 2 MG/ML injection 2 mg  2 mg Intravenous Q2H PRN     REVIEW OF SYSTEMS     Comprehensive Review of Systems obtained, and is negative other than that mentioned in the History of Present Illness.      PHYSICAL EXAMINATION     VITALS: /90 (BP Location: Left arm)   Pulse 73   Temp 97.9 °F (36.6 °C) (Oral)   Resp 18   Ht 63\"   Wt 108 lb 3.9 oz (49.1 kg)   SpO2 99%   BMI 19.17 kg/m²     GENERAL:  No acute distress, non-toxic appearing, speech fluent, mood appropriate    HEENT:  Normocephalic, atraumatic    RESP: Non-labored, easy, even    CV: NSR on tele    NEUROLOGICAL:  Alert to self and month only.  Speech fluent. Able to follow most simple commands. PERRLA +3 brisk,  EOMI.  VFF.  Face is symmetrical. CN II-XII grossly intact. Sensation to  light touch is intact bilaterally.  No drift RUE or BLE. LTETY LUE drift due to proximal LUE weakness. Gait deferred.       DIAGNOSTIC DATA     Lab Results   Component Value Date    K 4.2 05/28/2024    PGLU 102 05/28/2024     IMAGING     MRI BRAIN MRA BRAIN MRA NECK (CPT=70551/97577/95171)    Result Date: 5/28/2024  CONCLUSION:  See above.   Findings discussed with the patient's nurse Ms. Ray at 0210 hours on 5/28/2024.   LOCATION:  [place]   Dictated by (CST): Stromberg, LeRoy, MD on 5/28/2024 at 2:03 AM     Finalized by (CST): Stromberg, LeRoy, MD on 5/28/2024 at 2:12 AM         ASSESSMENT & PLAN     ASSESSMENT:  Right frontoparietal infarct with hemorrhagic conversion  Age-indeterminate L2 compression fracture   LUE weakness  AMS    PLAN:  No acute surgical intervention is indicated at this time  Repeat MRI/MRA brain pending   Medical management per hospitalist  PT/OT/ST  Stroke management and Keppra per Neurology  Further Neurosurgical recommendations to follow imaging     Thank you very much for the consult.    Rosa Thao, APRN-NP  [facility]  5/28/2024     Total visit time: 15 minutes; More than 50% spent coordinating care, counseling, reviewing imaging and discussing medication therapy.   Is this a shared or split note between Advanced Practice Provider and Physician? Yes

## 2024-05-28 NOTE — CONSULTS
.[facility]    Tierra Anderson Patient Status:  Inpatient    1927 MRN [id number]   Location [facility] 3NE-A Attending Kathy Leal DO   Hosp Day # 0 PCP RAMON WU     Patient Identification  Tierra Anderson is a 89 year old female.  :  1927  Admit Date:  2024  Attending Provider:  Kathy Leal DO                                  Primary Care Physician:  RAMON WU   Admitting Diagnosis: Unable to ambulate [R26.2]  Closed compression fracture of L2 vertebra, initial encounter ([facility]) [S32.020A]    Subjective:      Reason for Consultation: Impaired ADL and mobility dysfuction due to Acute infarct involving the right frontoparietal lobe with probable hemorrhagic conversion with petechial hemorrhage.    History of present illness:  Records reviewed, and patient examined.Consult Requested by:     Tierra Anderson is a 89 year old female with pmhx of CAD, HTN, HLD who was admitted on 24 from home with complaint of back pain. Pt is a poor historian and history only obtained from review of records.    XR Lumbar spine 24 CONCLUSION:     1. Age-indeterminate compression fracture of the superior endplate of L2 which is mild.  If there is concern for an acute fracture, further evaluation with MRI lumbar spine would be recommended.       CT Brain 24 .CONCLUSION:    1. Findings suggestive of acute-subacute infarct involving the right frontoparietal lobe with probable hemorrhagic conversion with petechial hemorrhage.  Consider further evaluation with contrast enhanced MRI, as clinically indicated.   2. Chronic microvascular ischemic changes and mild to moderate generalized parenchymal atrophy.      MRI 24 FINDINGS:     Examination is essentially nondiagnostic.  There is a hemorrhagic infarct within the right parietal lobe as noted on the head CT from earlier on 2024.  This measures 4.3 x 3.5 cm in the axial plane.    There are separate  punctate acute infarcts within the parasagittal frontal lobes.    There is moderate prominence of the lateral ventricles.    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  A component this finding could be developmental.    Chronic sphenoid sinusitis.     No Neurology consult or Ortho consult yet.    Pain localized to low back area without distal radiation.  No seizures/headaches/emesis.      Physiatry consult obtained now to assess pt's funtional status and make appropriate recommendations.      HOME SITUATION  Type of Home: House   Home Layout: One level  Stairs to Enter : 2     Lives With: Alone  Drives: Yes  Patient Owned Equipment: Rolling walker  Patient Regularly Uses: Reading glasses     Prior Level of [place]:   Pt questionable historian- reported she lived in house with her spouse, ambulates with rollator, unable to tell much more.  Daughter (Itzel)- pt lives in two [facility], stays on main floor of home. Lives alone, but dtr (Itzel) visits everyday. Supposed to ambulate with RW at all times. Up until now she was doing everything on her own- sponge bathing, dressing, etc.   Drives- just passed her drivers test and renewed her license.   Does laundry one in a while, otherwise dtr does it for her. Daughter brings over meals each day.   Writes her own checks for her bills.   Had recent admission at [facility] for \"mild heart attack\" (per dtr) and had a change in meds.    Current Functional Status:     Bed Mobility:  Rolling: NT           Supine<>Sit: Mod A. HOB elevated pt was able to initiate activity. Required increase assistance with trunk             Sit<>Supine: Max A             Transfer Mobility:  Sit<>Stand: Mod A x 2. From bed to RW. Pt required to place hands on RW to assist to pull  up  Stand<>Sit: Max A            Gait: NT    Past Medical History:  @Medical hx@  Past Medical History:    Coronary atherosclerosis    Essential hypertension    High blood pressure    High cholesterol        History reviewed. No pertinent surgical history.     nystatin (Mycostatin) [id number] UNIT/ML oral suspension 500,000 Units  5 mL Oral QID    isosorbide mononitrate ER (Imdur) 24 hr tab 30 mg  30 mg Oral Daily    sodium chloride 0.9% infusion   Intravenous Continuous    acetaminophen (Tylenol) tab 650 mg  650 mg Oral Q4H PRN    Or    acetaminophen (Tylenol) rectal suppository 650 mg  650 mg Rectal Q4H PRN    labetalol (Trandate) 5 mg/mL injection 10 mg  10 mg Intravenous Q10 Min PRN    hydrALAzine (Apresoline) 20 mg/mL injection 10 mg  10 mg Intravenous Q2H PRN    [COMPLETED] potassium chloride 20 mEq/100mL IVPB premix 20 mEq  20 mEq Intravenous Once    [] HYDROmorphone (Dilaudid) 1 MG/ML injection 0.5 mg  0.5 mg Intravenous Q30 Min PRN    [] ondansetron (Zofran) 4 MG/2ML injection 4 mg  4 mg Intravenous Q4H PRN    calcium carbonate-vitamin D (Oyster Shell-D) 250-3.125 MG-MCG per tab 2 tablet  2 tablet Oral TID CC    sennosides (Senokot) tab 17.2 mg  17.2 mg Oral Nightly    atorvastatin (Lipitor) tab 20 mg  20 mg Oral Nightly    metoprolol succinate ER (Toprol XL) 24 hr tab 25 mg  25 mg Oral Daily Beta Blocker    melatonin tab 3 mg  3 mg Oral Nightly PRN    ondansetron (Zofran) 4 MG/2ML injection 4 mg  4 mg Intravenous Q6H PRN    [] lactated ringers infusion   Intravenous Continuous    acetaminophen (Tylenol Extra Strength) tab 500 mg  500 mg Oral Q4H PRN    acetaminophen (Tylenol Extra Strength) tab 1,000 mg  1,000 mg Oral TID    Or    HYDROcodone-acetaminophen (Norco) 5-325 MG per tab 1 tablet  1 tablet Oral TID    Or    HYDROcodone-acetaminophen (Norco) 5-325 MG per tab 2 tablet  2 tablet Oral TID    polyethylene glycol (PEG 3350) (Miralax) 17 g oral packet 17 g  17 g Oral Daily PRN    sennosides (Senokot) tab 17.2 mg  17.2 mg Oral Nightly PRN    bisacodyl (Dulcolax) 10 MG rectal suppository 10 mg  10 mg Rectal Daily PRN    fleet enema (Fleet) 7-19 GM/118ML rectal enema 133 mL  1 enema  Rectal Once PRN    [COMPLETED] morphINE PF 2 MG/ML injection 2 mg  2 mg Intravenous Once    morphINE PF 2 MG/ML injection 0.5 mg  0.5 mg Intravenous Q2H PRN    Or    morphINE PF 2 MG/ML injection 1 mg  1 mg Intravenous Q2H PRN    Or    morphINE PF 2 MG/ML injection 2 mg  2 mg Intravenous Q2H PRN       Social History     Tobacco Use    Smoking status: Never    Smokeless tobacco: Never   Substance Use Topics    Alcohol use: Not Currently       No family history on file.    Allergies:  Allergies   Allergen Reactions    Aspirin OTHER (SEE COMMENTS)     Stomach upset           Lab Results   Component Value Date    K 4.2 05/28/2024    PGLU 102 05/28/2024       Review of Systems:  Complete review of systems completed/14 point.  Negative except as that outlined in HPI    OBJECTIVE:    Blood pressure 136/90, pulse 73, temperature 97.9 °F (36.6 °C), temperature source Oral, resp. rate 18, height 5' 3\" (1.6 m), weight 108 lb 3.9 oz (49.1 kg), SpO2 99%.  No intake or output data in the 24 hours ending 05/28/24 1228    Physical Exam:                                      General: Alert, cooperative, no distress, appears stated age.  Head:  Normocephalic, without obvious abnormality, atraumatic.   Eyes:  Conjunctivae/lids clear. PERRL, EOMs intact. Vision functional.   Ears/Nose/Throat: Hearing intact. Lips, mucosa, and tongue normal. Teeth and gums normal. Moist mucous membranes.     Neck: No neck masses or thyroid enlargement/tenderness/nodules.       Lungs:   Resonant, clear breath sounds, quiet accessory muscles.   Chest wall:  No tenderness or deformity.   Cardiovascular:  Heart with regular rate rhythm, no murmurs appreciated. Radial and pedal pulses good. No cyanosis in all extremities.   Abdomen:   No tenderness guarding or rigidity. Liver and spleen are not enlarged.  Bowel sounds present.                   Musculoskeletal:     Right Upper Extremity:  Strength is 4.  ROM WNL.   Left Upper Extremity:  Strength is 3-4.  ROM  WNL.   Right Lower Extremity:  Strength  is 4.   ROM WNL.   Left Lower Extremity: Strength  is 4 at ankle but limited hip/quad strength.   ROM WNL.          Neuro: CNII-XII are grossly intact.                   Psychiatric: Awake, alert and oriented x 2. Pleasant.       Assessment:                                Rehab diagnosis: ADL and  mobility dysfunction due to Acute infarct involving the right frontoparietal lobe with probable hemorrhagic conversion with petechial hemorrhage    Disposition:     Met with patient, her daughter Itzel and 2 grand-daughters.  They are questioning why rehab consult was being done before a Neurology consult.  Explained the process.    Please have SLP eval pt too.    Once medically cleared, based on pt's current functional and medical status, Acute inpatient rehabilitation is recommended to maximize patient's independence, provide care giver training, and evaluate for home equipment needs with ELOS 3 weeks.     Medical necessity includes  thromboembolic risk, fall risk, seizure risk, medication management, bleeding risk,  blood pressure management, prevention of pressure ulcer, prevention of aspiration pneumonia.     Patient would benefit and is able to participate in 3 hours of therapy daily. Patient is anticipated to discharge to home when acute inpatient rehabilitation course is complete.          Thank you for the consult.     Lindsey Hernandez MD  [facility].

## 2024-05-28 NOTE — OCCUPATIONAL THERAPY NOTE
OCCUPATIONAL THERAPY TREATMENT NOTE - INPATIENT     Room Number: 3616/3616-A  Session: 1   Number of Visits to Meet Established Goals: 7    Presenting Problem: L2 compression fx; Acute R CVA    HOME SITUATION  Type of Home: [facility]  Home Layout: One level;Two level;Able to live on main level  Lives With: Alone    Co-Morbidities : CAD, HTN, scoliosis (per xray L spine)    Toilet and Equipment: Standard height toilet  Shower/Tub and Equipment:  (sponge bathes)     Hand Dominance: Right  Drives: Yes  Patient Regularly Uses: Reading glasses     Prior Level of Function: Per daughter, pt is independent with ADL and mobility via walker. She sponge bathes. She drives and manages her own finances. Daughter visits daily but is often at work. Above home info per daughter.     ASSESSMENT   Patient demonstrates good  progress this session, goals remain in progress.    Patient continues to function below baseline with toileting, bathing, upper body dressing, lower body dressing, grooming, eating, bed mobility, transfers, stating sitting balance, dynamic sitting balance, static standing balance, dynamic standing balance, functional standing tolerance, and performing household tasks.   Contributing factors to remaining limitations include decreased functional strength, decreased functional reach, impaired sitting and standing balance, impaired coordination, impaired motor planning, difficulty maintaining precautions, cognitive deficits (problem solving, attention), decreased insight to deficits, decreased safety awareness, visual deficits, and decreased visual spatial awareness.  Next session anticipate patient to progress grooming, bed mobility, transfers, dynamic sitting balance, and static standing balance.  Occupational Therapy will continue to follow patient for duration of hospitalization.    Patient continues to benefit from continued skilled OT services: to facilitate return to prior level of function as patient  demonstrates high motivation with excellent tolerance to an intensive therapy program .          OT Device Recommendations: TBD    History: Patient is a 89 year old female admitted on 5/26/2024 with Presenting Problem: L2 fx, rececnt admit to [facility] for fall and heart attack.. Co-Morbidities : CAD, HTN, scoliosis (per xray L spine)     UPDATE 5/27: CTH: CONCLUSION:    1. Findings suggestive of acute-subacute infarct involving the right frontoparietal lobe with probable hemorrhagic conversion with petechial hemorrhage.  Consider further evaluation with contrast enhanced MRI, as clinically indicated.   2. Chronic microvascular ischemic changes and mild to moderate generalized parenchymal atrophy.     WEIGHT BEARING RESTRICTION  Weight Bearing Restriction: None     TREATMENT SESSION:  Patient alert, oriented to self, \"hospital\", month, and recognizes daughter and able to state her name.  Patient disoriented to situation and year.    Patient able to follow simple motor commands w/ repetition and increased time.  Patient noted to rest head turned to the right, however does turn head to the left and able to visually scan to the left when auditory stimuli provided on left.  Patient able to visually track to the left past midline with cueing.  Daughter present and was educated on sitting on left and providing opportunities for patient to reach for objects presented on the left.  Patient's daughter verbalized understanding.    EOB sitting: min A for balance w/ self corrected attempts when repositioning and reaching out to hold on to footboard with right UE.  Patient able to maintain EOB static sitting with close CGA.    Reaching with LUE in all planes w/ noted full active range and WFL strength.  Patient w/ dysmetria and uncontrolled reaching w/ LUE, however does attempt to use LUE throughout session.    Patient able to verbally communicate needs and initiated social conversation.  Smiling and making eye contact  throughout session.  Patient Start of Session: supine  FUNCTIONAL TRANSFER ASSESSMENT  Sit to Stand: Edge of Bed  Edge of Bed: Maximum Assist (mod A of 2)    BED MOBILITY  Rolling: Maximum Assist  Supine to Sit : Moderate Assist  Sit to Supine (OT): Maximum Assist  Scooting: mod A    BALANCE ASSESSMENT  Static Sitting: Minimal Assist  Sitting Unilateral: Minimal Assist  Static Standing: Moderate Assist  Standing Unilateral: Maximum Assist    FUNCTIONAL ADL ASSESSMENT  Eating: Stand-by Assist      ACTIVITY TOLERANCE: Tolerated > 20 min EOB sitting without c/o fatigue.  Standing tolerance: 1 min x 3 reps w/ c/o fatigue and returning to sit.      EOB BP: 136/90         O2 SATURATIONS   93% on room air    EDUCATION PROVIDED  Patient: Plan of Care; Role of Occupational Therapy; Functional Transfer Techniques; Fall Prevention; Posture/Positioning; Compensatory ADL Techniques; Proper Body Mechanics  Patient's Response to Education: Verbalized Understanding; Requires Further Education  Family/Caregiver: Role of Occupational Therapy; Plan of Care  Family/Caregiver's Response to Education: Verbalized Understanding      Equipment used: RW, gait belt, hospital functions  Demonstrates functional use, Would benefit from additional trial      Exercises:    Exercises Repetitions Comments   Scapular elevation     Scapular retraction     Shoulder rolls     Shoulder flexion     Shoulder abduction     Shoulder internal/external rotation     Forward punch     Elbow flexion     Elbow extension     Forearm pronation/supination     Wrist flexion/extension     Gross grasp/fist pumps     Ankle pumps     Knee extension     Marching       Patient End of Session: With  staff;Needs met;Call light within reach;RN aware of session/findings;All patient questions and concerns addressed;Alarm set;Family present;Discussed recommendations with /;In bed    SUBJECTIVE  \"It's May of course.\"    PAIN ASSESSMENT  Rating: Unable to  rate  Location: c/o pain w/ transitional movements however unable to specify location  Management Techniques: Body mechanics;Activity promotion;Repositioning     OBJECTIVE  Precautions: Bed/chair alarm;Spine    AM-PAC ‘6-Clicks’ Inpatient Daily Activity Short Form  -   Putting on and taking off regular lower body clothing?: A Lot  -   Bathing (including washing, rinsing, drying)?: A Lot  -   Toileting, which includes using toilet, bedpan or urinal? : A Lot  -   Putting on and taking off regular upper body clothing?: A Lot  -   Taking care of personal grooming such as brushing teeth?: A Little  -   Eating meals?: A Little    AM-PAC Score:  Score: 14  Approx Degree of Impairment: 59.67%  Standardized Score (AM-PAC Scale): 33.39    PLAN  OT Treatment Plan: Balance activities;Energy conservation/work simplification techniques;ADL training;Functional transfer training;Visual perceptual training;UE strengthening/ROM;Endurance training;Cognitive reorientation;Patient/Family education;Equipment eval/education;Patient/Family training;Neuromuscluar reeducation;Fine motor coordination activities;Compensatory technique education  Rehab Potential : Good  Frequency: 3-5x/week    (ongoing 5/28/2024)  ADL Goals  Patient will perform toileting with mod (A) and AE PRN  Patient will perform LB dressing with mod (A) and AE PRN     Functional Transfer Goals  Patient will perform bed mobility supine to sit with mod (A)- MET 5/28 will continue goal to work towards consistency   Patient will perform bed mobility sit to supine with mod (A)  Patient will perform commode transfer with mod (A)     Additional Goals:  Patient will maintain spine precautions during ADL with cueing prn  Pt will sit EOB CGA 5 minutes    OT Session Time: 45 minutes  Self-Care Home Management: 25 minutes  Therapeutic Activity: 20 minutes

## 2024-05-28 NOTE — CM/SW NOTE
Notified by PT anticipated need has changed to intensive therapy program.     Anticipated therapy need: Intensive Therapy Program  PMR consult placed and pending. Updated [facility] liaison of new consult.     Await PMR recommendations for further discharge planning. STACY will continue to follow.     Addendum (12:30pm) - STACY met with pt's dtr Itzel and pt's family at bedside to discuss discharge plan. STACY informed pt's dtr the therapy team anticipate pt will benefit from an intensive therapy program at discharge. Pt's dtr stated rehab would be beneficial to pt, but stated this writer needed to talk with pt's HCPOA Negro. Pt's dtr stated pt's spouse is Abhi and pt does not want her spouse involved in her care. Pt's dtr stated pt's HCPOA is a deacon from her [employer] and used to be a paramedic, but they communicate with each other. STACY informed pt's dtr this writer will reach out to Negro once PMR recommendations are available to discuss discharge plan.     Addendum (3:20pm) - STACY noted PMR is recommending Acute Inpatient Rehab for pt at discharge. STACY spoke with pt's HCPOA/friend Negro via phone ([phone]). Pt does not have HCPOA documentation on paper chart or in Epic.     STACY discussed AR. Negro stated he is agreeable with AR if it is recommended and beneficial to the pt. Negro stated pt's spouse lives at [facility] and he is planning to have pt move there as well. STACY informed Negro [facility] would be a short-term acute rehab prior to senior living placement. Negro stated he is agreeable to AR and will review facility options. Negro provided his email [email or web address].     AR referrals pending in [facility], choice list will be emailed once available. Insurance authorization will be needed prior to discharge. STACY will continue to follow.     BRIDGETTE Broderick  Discharge Planner

## 2024-05-28 NOTE — PLAN OF CARE
89 y/o female a/o to person only. Patient is more able to use left side of body and has more strength on left arm and left leg. EEG was ordered and completed. MRI with anesthesia was ordered and is currently being competed. Dual RN witness to POA over the phone to consent for anesthesia. Tele NSR.  RA. Q4 Neuro.  Continue plan of care.    Problem: Patient/Family Goals  Goal: Patient/Family Long Term Goal  Description: Patient's Long Term Goal: No pain    Interventions:  - Pain meds PRN   - Repositioning  - PT/OT  - See additional Care Plan goals for specific interventions     Outcome: Progressing  Goal: Patient/Family Short Term Goal  Description: Patient's Short Term Goal: Less pain    Interventions:   - Pain meds PRN   - Repositioning  - PT/OT  - See additional Care Plan goals for specific interventions     Outcome: Progressing

## 2024-05-28 NOTE — PROCEDURES
EEG REPORT;     Reason for Examination: Encephalopathy     Technical Summary:   18 Channels of EEG and 1 Channel of EKG was performed utilizing internation 10/20 method.          Background Activity:   The background activity consisted of 9 Hz waveforms, reactive to eye opening/ external stimulation.     Abnormality:  Throughout the recording low to medium voltage, polymorphic, 3 to 6 Hz slow activity was noted diffusely over both hemispheres        Activation:     Hyperventilation:   Not Performed.     Photic Stimulation:  Driving response seen.No        Sleep:  Stage I and II sleep seen.     Impression:  This is a Abnormal EEG.    Mild diffuse slowing into delta and theta range was noted.  This constellation of findings can be seen in encephalopathy due to metabolic/toxic etiology, medication effects or diffuse cerebral injury.  No focal, lateralized or generalized epileptiform activity seen.  Clinical correlation is recommended.           Rich Ruvalcaba MD  [facility].

## 2024-05-29 ENCOUNTER — APPOINTMENT (OUTPATIENT)
Dept: CV DIAGNOSTICS | Facility: HOSPITAL | Age: 89
DRG: 064 | End: 2024-05-29
Attending: INTERNAL MEDICINE
Payer: MEDICARE

## 2024-05-29 LAB
ANION GAP SERPL CALC-SCNC: 4 MMOL/L (ref 0–18)
BASOPHILS # BLD AUTO: 0.04 X10(3) UL (ref 0–0.2)
BASOPHILS NFR BLD AUTO: 0.5 %
BUN BLD-MCNC: 25 MG/DL (ref 9–23)
CALCIUM BLD-MCNC: 8.7 MG/DL (ref 8.5–10.1)
CHLORIDE SERPL-SCNC: 111 MMOL/L (ref 98–112)
CO2 SERPL-SCNC: 23 MMOL/L (ref 21–32)
CREAT BLD-MCNC: 0.79 MG/DL
EGFRCR SERPLBLD CKD-EPI 2021: 68 ML/MIN/1.73M2 (ref 60–?)
EOSINOPHIL # BLD AUTO: 0.21 X10(3) UL (ref 0–0.7)
EOSINOPHIL NFR BLD AUTO: 2.6 %
ERYTHROCYTE [DISTWIDTH] IN BLOOD BY AUTOMATED COUNT: 14.6 %
GLUCOSE BLD-MCNC: 116 MG/DL (ref 70–99)
GLUCOSE BLD-MCNC: 124 MG/DL (ref 70–99)
GLUCOSE BLD-MCNC: 145 MG/DL (ref 70–99)
GLUCOSE BLD-MCNC: 147 MG/DL (ref 70–99)
HCT VFR BLD AUTO: 38.7 %
HGB BLD-MCNC: 12.8 G/DL
IMM GRANULOCYTES # BLD AUTO: 0.03 X10(3) UL (ref 0–1)
IMM GRANULOCYTES NFR BLD: 0.4 %
LYMPHOCYTES # BLD AUTO: 1.12 X10(3) UL (ref 1–4)
LYMPHOCYTES NFR BLD AUTO: 13.8 %
MCH RBC QN AUTO: 29.8 PG (ref 26–34)
MCHC RBC AUTO-ENTMCNC: 33.1 G/DL (ref 31–37)
MCV RBC AUTO: 90.2 FL
MONOCYTES # BLD AUTO: 0.7 X10(3) UL (ref 0.1–1)
MONOCYTES NFR BLD AUTO: 8.6 %
NEUTROPHILS # BLD AUTO: 6.04 X10 (3) UL (ref 1.5–7.7)
NEUTROPHILS # BLD AUTO: 6.04 X10(3) UL (ref 1.5–7.7)
NEUTROPHILS NFR BLD AUTO: 74.1 %
OSMOLALITY SERPL CALC.SUM OF ELEC: 293 MOSM/KG (ref 275–295)
PLATELET # BLD AUTO: 133 10(3)UL (ref 150–450)
PLATELETS.RETICULATED NFR BLD AUTO: 2.1 % (ref 0–7)
POTASSIUM SERPL-SCNC: 3.9 MMOL/L (ref 3.5–5.1)
RBC # BLD AUTO: 4.29 X10(6)UL
SODIUM SERPL-SCNC: 138 MMOL/L (ref 136–145)
WBC # BLD AUTO: 8.1 X10(3) UL (ref 4–11)

## 2024-05-29 PROCEDURE — 93306 TTE W/DOPPLER COMPLETE: CPT | Performed by: INTERNAL MEDICINE

## 2024-05-29 PROCEDURE — 99233 SBSQ HOSP IP/OBS HIGH 50: CPT | Performed by: OTHER

## 2024-05-29 PROCEDURE — 99232 SBSQ HOSP IP/OBS MODERATE 35: CPT | Performed by: INTERNAL MEDICINE

## 2024-05-29 PROCEDURE — 99221 1ST HOSP IP/OBS SF/LOW 40: CPT | Performed by: NEUROLOGICAL SURGERY

## 2024-05-29 NOTE — PROGRESS NOTES
[facility]   part of [facility]     Hospitalist Progress Note     Tierra Anderson Patient Status:  Observation    1927 MRN [id number]   Location [facility] 3SW-A Attending Kathy Leal, DO   Hosp Day # 1 PCP RAMON WU     Chief Complaint: Back pain    Subjective:     Patient denies any back pain. She is emotional because she is homesick.     Objective:    Review of Systems:   A comprehensive review of systems was completed; pertinent positive and negatives stated in subjective.    Vital signs:  Temp:  [97.6 °F (36.4 °C)-98.3 °F (36.8 °C)] 98.1 °F (36.7 °C)  Pulse:  [56-87] 62  Resp:  [13-21] 16  BP: (115-186)/() 147/109  SpO2:  [94 %-98 %] 96 %    Physical Exam:    General: No acute distress, frail/elderly appearing, slightly confused  Respiratory: No wheezes, no rhonchi  Cardiovascular: S1, S2, regular rate and rhythm  Abdomen: Soft, Non-tender, non-distended, positive bowel sounds  Neuro: Left sided weakness. Has difficulty following commands  Extremities: No edema    Diagnostic Data:    Labs:  Recent Labs   Lab 24  0409   WBC 11.5*   HGB 14.5   MCV 90.3   .0*     Recent Labs   Lab 24  0409 24  0752   *  --    BUN 30*  --    CREATSERUM 0.94  --    CA 9.1  --      --    K 3.8 4.2     --    CO2 25.0  --      Estimated Creatinine Clearance: 27.1 mL/min (based on SCr of 0.94 mg/dL).    No results for input(s): \"TROP\", \"TROPHS\", \"CK\" in the last 168 hours.    No results for input(s): \"PTP\", \"INR\" in the last 168 hours.    Lab Results   Component Value Date    TSH 2.030 2024     Microbiology  No results found for this visit on 24.    Imaging: Reviewed in Epic.    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 Concern for embolic etiology of CVA  -Stroke protocol  -Echo with bubble study ordered  -Heparin ppx/aspirin discontinued  -Neurology consult placed  -PT/OT/ST    #Intractable back pain  #Mechanical fall  #Inability to ambulate  #L2 compression fracture  -Pain control, anti-emetics PRN  -PMR recommending acute inpatient rehab  -No acute surgical intervention per NSGY     #HTN  -Metoprolol  -Imdur     #HLD  -Statin    #CAD  -Hold aspirin d/t hemorrhagic conversion CVA  -Statin       Erwin Rivas,     Supplementary Documentation:     Quality:  DVT Mechanical Prophylaxis:   SCDs,    DVT Pharmacologic Prophylaxis   Medication   None     Code Status: Full Code  Gibbons: External urinary catheter in place  GISSELL: TBD    Discharge is dependent on: Placement, consultant recs  At this point Ms. Anderson is expected to be discharge to: [facility]    The 21st Century Cures Act makes medical notes like these available to patients in the interest of transparency. Please be advised this is a medical document. Medical documents are intended to carry relevant information, facts as evident, and the clinical opinion of the practitioner. The medical note is intended as peer to peer communication and may appear blunt or direct. It is written in medical language and may contain abbreviations or verbiage that are unfamiliar.

## 2024-05-29 NOTE — PLAN OF CARE
Assume care @ 0730  AO X 2, Verbally responsive, GERALD CORONA. Neuro check Q4H, NIH daily. Weakness to left side   NSR on Tele. Denies pain. LSO brace ordered and delivered.   Continent with purewick on. X2 assist with care.   Echo done. Oral Nystatin  IVFs 0.9 @ 75ml/hr. Need set up with meals.  Fair appetite meals. On stroke protocol. QID accu check.   Fall and safety precautions in place   and daughter update on POC      Problem: Patient/Family Goals  Goal: Patient/Family Long Term Goal  Description: Patient's Long Term Goal: No pain    Interventions:  - Pain meds PRN   - Repositioning  - PT/OT  - See additional Care Plan goals for specific interventions     5/29/2024 1802 by Zara Gabriel RN  Outcome: Progressing  5/29/2024 1802 by Zara Gabriel RN  Outcome: Progressing  Goal: Patient/Family Short Term Goal  Description: Patient's Short Term Goal: Less pain    Interventions:   - Pain meds PRN   - Repositioning  - PT/OT  - See additional Care Plan goals for specific interventions     5/29/2024 1802 by Zara Gabriel RN  Outcome: Progressing  5/29/2024 1802 by Zara Gabriel RN  Outcome: Progressing     Problem: NEUROLOGICAL - ADULT  Goal: Achieves stable or improved neurological status  Description: INTERVENTIONS  - Assess for and report changes in neurological status  - Initiate measures to prevent increased intracranial pressure  - Maintain blood pressure and fluid volume within ordered parameters to optimize cerebral perfusion and minimize risk of hemorrhage  - Monitor temperature, glucose, and sodium. Initiate appropriate interventions as ordered  5/29/2024 1802 by Zara Gabriel RN  Outcome: Progressing  5/29/2024 1802 by Zara Gabriel RN  Outcome: Progressing  Goal: Achieves maximal functionality and self care  Description: INTERVENTIONS  - Monitor swallowing and airway patency with patient fatigue and changes in neurological status  - Encourage and assist patient to increase  activity and self care with guidance from PT/OT  - Encourage visually impaired, hearing impaired and aphasic patients to use assistive/communication devices  5/29/2024 1802 by Zara Gabriel RN  Outcome: Progressing  5/29/2024 1802 by Zara Gabriel RN  Outcome: Progressing     Problem: SAFETY ADULT - FALL  Goal: Free from fall injury  Description: INTERVENTIONS:  - Assess pt frequently for physical needs  - Identify cognitive and physical deficits and behaviors that affect risk of falls.  - [place] fall precautions as indicated by assessment.  - Educate pt/family on patient safety including physical limitations  - Instruct pt to call for assistance with activity based on assessment  - Modify environment to reduce risk of injury  - Provide assistive devices as appropriate  - Consider OT/PT consult to assist with strengthening/mobility  - Encourage toileting schedule  5/29/2024 1802 by Zara Gabriel RN  Outcome: Progressing  5/29/2024 1802 by Zara Gabriel RN  Outcome: Progressing     Problem: PAIN - ADULT  Goal: Verbalizes/displays adequate comfort level or patient's stated pain goal  Description: INTERVENTIONS:  - Encourage pt to monitor pain and request assistance  - Assess pain using appropriate pain scale  - Administer analgesics based on type and severity of pain and evaluate response  - Implement non-pharmacological measures as appropriate and evaluate response  - Consider cultural and social influences on pain and pain management  - Manage/alleviate anxiety  - Utilize distraction and/or relaxation techniques  - Monitor for opioid side effects  - Notify MD/LIP if interventions unsuccessful or patient reports new pain  - Anticipate increased pain with activity and pre-medicate as appropriate  5/29/2024 1802 by Zara Gabriel RN  Outcome: Progressing  5/29/2024 1802 by Zara Gabriel RN  Outcome: Progressing     Problem: SKIN/TISSUE INTEGRITY - ADULT  Goal: Skin integrity remains  intact  Description: INTERVENTIONS  - Assess and document risk factors for pressure ulcer development  - Assess and document skin integrity  - Monitor for areas of redness and/or skin breakdown  - Initiate interventions, skin care algorithm/standards of care as needed  5/29/2024 1802 by Zara Gabriel RN  Outcome: Progressing  5/29/2024 1802 by Zara Gabriel RN  Outcome: Progressing     Problem: MUSCULOSKELETAL - ADULT  Goal: Maintain proper alignment of affected body part  Description: INTERVENTIONS:  - Support and protect limb and body alignment per provider's orders  - Instruct and reinforce with patient and family use of appropriate assistive device and precautions (e.g. spinal or hip dislocation precautions)  5/29/2024 1802 by Zara Gabriel RN  Outcome: Progressing  5/29/2024 1802 by Zara Gabriel RN  Outcome: Progressing     Problem: Impaired Cognition  Goal: Patient will exhibit improved attention, thought processing and/or memory  Description: Interventions:  5/29/2024 1802 by Zara Gabriel RN  Outcome: Progressing  5/29/2024 1802 by Zara Gabriel RN  Outcome: Progressing

## 2024-05-29 NOTE — PLAN OF CARE
Assumed care at 1930. A&O X2, disoriented to time and situation. RA, VSS, NSR on tele. SBP parameters 100-150. Cardiac electrolyte replacement protocol. Lab draw. PIV in L upper arm flushed and capped. Dressing CDI. Regular diet, pills whole with thin. MD notified after pt returned from MRI/MRA and diet was re-ordered. Passed RN dysphagia. Incontinent, purewick in place. Last BM 5/26. Denies pain. Max assist. Sz prec. Stroke protocol, neuro checks Q4, NIH daily, aspiration precautions. L sided weakness and decreased sensation. Accu QID per stroke protocol. Daily weight. Thrush, receiving nystatin swish. Pt was very disoriented after waking up from anesthesia of MRI. Pulling at medical equipment including IV, tele, and purewick. Re-orientation strategies utilized, medical equipment disguised, and busy blanket given. Effective, pt reoriented to A&O x2.     Problem: Patient/Family Goals  Goal: Patient/Family Long Term Goal  Description: Patient's Long Term Goal: No pain    Interventions:  - Pain meds PRN   - Repositioning  - PT/OT  - See additional Care Plan goals for specific interventions     Outcome: Progressing  Goal: Patient/Family Short Term Goal  Description: Patient's Short Term Goal: Less pain    Interventions:   - Pain meds PRN   - Repositioning  - PT/OT  - See additional Care Plan goals for specific interventions     Outcome: Progressing     Problem: NEUROLOGICAL - ADULT  Goal: Achieves stable or improved neurological status  Description: INTERVENTIONS  - Assess for and report changes in neurological status  - Initiate measures to prevent increased intracranial pressure  - Maintain blood pressure and fluid volume within ordered parameters to optimize cerebral perfusion and minimize risk of hemorrhage  - Monitor temperature, glucose, and sodium. Initiate appropriate interventions as ordered  Outcome: Progressing  Goal: Achieves maximal functionality and self care  Description: INTERVENTIONS  - Monitor  swallowing and airway patency with patient fatigue and changes in neurological status  - Encourage and assist patient to increase activity and self care with guidance from PT/OT  - Encourage visually impaired, hearing impaired and aphasic patients to use assistive/communication devices  Outcome: Progressing     Problem: SAFETY ADULT - FALL  Goal: Free from fall injury  Description: INTERVENTIONS:  - Assess pt frequently for physical needs  - Identify cognitive and physical deficits and behaviors that affect risk of falls.  - [place] fall precautions as indicated by assessment.  - Educate pt/family on patient safety including physical limitations  - Instruct pt to call for assistance with activity based on assessment  - Modify environment to reduce risk of injury  - Provide assistive devices as appropriate  - Consider OT/PT consult to assist with strengthening/mobility  - Encourage toileting schedule  Outcome: Progressing     Problem: PAIN - ADULT  Goal: Verbalizes/displays adequate comfort level or patient's stated pain goal  Description: INTERVENTIONS:  - Encourage pt to monitor pain and request assistance  - Assess pain using appropriate pain scale  - Administer analgesics based on type and severity of pain and evaluate response  - Implement non-pharmacological measures as appropriate and evaluate response  - Consider cultural and social influences on pain and pain management  - Manage/alleviate anxiety  - Utilize distraction and/or relaxation techniques  - Monitor for opioid side effects  - Notify MD/LIP if interventions unsuccessful or patient reports new pain  - Anticipate increased pain with activity and pre-medicate as appropriate  Outcome: Progressing     Problem: SKIN/TISSUE INTEGRITY - ADULT  Goal: Skin integrity remains intact  Description: INTERVENTIONS  - Assess and document risk factors for pressure ulcer development  - Assess and document skin integrity  - Monitor for areas of redness and/or skin  breakdown  - Initiate interventions, skin care algorithm/standards of care as needed  Outcome: Progressing     Problem: MUSCULOSKELETAL - ADULT  Goal: Maintain proper alignment of affected body part  Description: INTERVENTIONS:  - Support and protect limb and body alignment per provider's orders  - Instruct and reinforce with patient and family use of appropriate assistive device and precautions (e.g. spinal or hip dislocation precautions)  Outcome: Progressing     Problem: Impaired Cognition  Goal: Patient will exhibit improved attention, thought processing and/or memory  Description: Interventions:  - Minimize distractions in the room when full attention is required  Outcome: Progressing

## 2024-05-29 NOTE — SPIRITUAL CARE NOTE
Spiritual Care Visit Note    Patient Name: Tierra Anderson Date of Spiritual Care Visit: 24   : 1927 Primary Dx: Closed compression fracture of L2 vertebra, initial encounter ([facility])       Referred By: Referral From: Family    Spiritual Care Taxonomy:    Intended Effects: Aligning care plan with patient's values    Methods: Offer emotional support    Interventions: Ask guided questions about cultural and Latter-day values;Provide compassionate touch;Provde spiritual/Latter-day resources;Share written prayer;Prayer for healing;Explain  role    Visit Type/Summary:     - Spiritual Care: Responded to a request for spiritual care and assessed the patient for spiritual care needs. Consulted with RN prior to visit. Provided support for Patient's spiritual/Latter-day requests. Coordinated Jehovah's witness Communion and verified NPO status. Itzel daughter was present and shared of Tierra's healthy, long life and resiliency through marriages and challenges.  also provided care to Itzel who is providing care not only to her mother, but her step dad in a nursing home.  remains available as needed for follow up.    Spiritual Care support can be requested via an Epic consult. For urgent/immediate needs, please contact the On Call  at: [facility]: ext [phone]

## 2024-05-29 NOTE — CM/SW NOTE
STACY emailed AR choice list to pt's HCPOA Negro at [email or web address]. Insurance authorization will be needed prior to discharge. STACY will f/u with Negro regarding AR choice.     Addendum (2:20pm) - STACY called pt's HCPOA Negro via phone regarding AR choice. Negro stated he did not receive the email and stated correct email is [email or web address]. STACY re-emailed AR choice list to Negro. Negro stated he has requested to speak with the neurologist and has not received a call, relayed to RN. Engro stated he still has not received choice list and provided additional email [email or web address]. Negro still did not receive choice list, STACY provided Negro with names of accepting facilities    [facility]     SW encouraged Negro to use Medicare.gov to review facility choices. Negro stated pt's dtr is not fond of [facility] and does not feel [facility] is an option. Negro stated he will review AR choices with pt's dtr Christina and he will have an answer for SW in 1-2 days. STACY informed Negro pt could be cleared for discharge soon and insurance authorization is needed prior to discharge. Negro stated he will reach out to SW tomorrow with AR choice after he speaks with pt's dtr.     Insurance authorization needed prior to discharge. STACY will f/u with pt's HCPOA regarding AR choice.     BRIDGETTE Broderick  Discharge Planner
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
## 2024-05-29 NOTE — DISCHARGE PLANNING
Patient follow-up appointment information:     Visit Type: Stroke follow-up  Date of TIA/Stroke: 05/26/2024  Type of Stroke: Ischemic  Patient to follow-up: 4 weeks  OP Neurologist: Any available neurologist  New patient to neurology service: Yes  Anticipated discharge (if known): TBD  Discharge disposition (if known): Acute rehab    Please call patient's daughter Fiona at [phone] to schedule follow-up appt.       RN Stroke Navigator team  [phone]

## 2024-05-29 NOTE — SPIRITUAL CARE NOTE
Spiritual Care Visit Note    Patient Name: Tierra Anderson Date of Spiritual Care Visit: 24   : 1927 Primary Dx: Closed compression fracture of L2 vertebra, initial encounter ([facility])       Referred By: Referral From: Other (Comment)    Spiritual Care Taxonomy:    Intended Effects: Promote a sense of peace    Methods: Collaborate with care team member;Offer support;Encourage story-telling    Interventions: Acknowledge current situation    Visit Type/Summary:     - Spiritual Care: Responded to a request via the on call phone Consulted with RN prior to visit.  remains available as needed for follow up.    Spiritual Care support can be requested via an [facility] consult. For urgent/immediate needs, please contact the On Call  at: [facility]: ext [phone]

## 2024-05-29 NOTE — PROGRESS NOTES
Stroke booklet given to patient; the following education was provided:     What is stroke  BEFAST - Stroke warning sings and symptoms  How to initiate EMS  Secondary stroke prevention and personalized risk factors: HTN, HL, CAD  Lifestyle modifications (nutrition and exercise)  Post-stroke recovery and follow-up  Community resources (stroke support group and non-emergent stroke line)    Daughter present during education, receptive to teachings. All pertinent questions and concerns were addressed.    Patient has chosen daughter Fiona as her designated care partner. She can be reached at [phone].      RN Stroke Navigator team  [phone]

## 2024-05-29 NOTE — PAYOR COMM NOTE
STARTED OUT OBSERVATION 5/26  MADE INPT 5/28    ADMISSION REVIEW     Payor: [facility] [facility]ARR [facility]  Subscriber #:  [id number]  Authorization Number: [id number]    Admit date: 5/28/24  Admit time:  9:52 AM       REVIEW DOCUMENTATION:     ED Provider Notes        ED Provider Notes signed by Demond Anderson MD at 5/26/2024  4:36 PM       Author: Demond Anderson MD Service: -- Author Type: Physician    Filed: 5/26/2024  4:36 PM Date of Service: 5/26/2024  2:19 PM Status: Signed    : Demond Anderson MD (Physician)           Patient Seen in: [facility] Emergency Department      History     Chief Complaint   Patient presents with    Fall     Stated Complaint: Fall    Subjective:     HPI    89-year-old woman with CAD and recent admission to [facility].  The daughter relays that she was admitted because her heart enzyme was abnormal and that there was \"heart damage.\"  She had medication adjustment and was discharged.  She lives at home with her daughter.  Today, patient reported a fall at home, managed to get herself up.  The daughter was at work but there was a friend with her.  It was reported that she was able to get herself up to the couch without assistance.  No reported pain in the head, neck, chest, or abdomen. Reported pain in the lower back.      Objective:   Past Medical History:    Coronary atherosclerosis    Essential hypertension    High blood pressure    High cholesterol              History reviewed. No pertinent surgical history.             Social History     Socioeconomic History    Marital status:    Tobacco Use    Smoking status: Never    Smokeless tobacco: Never   Vaping Use    Vaping status: Never Used   Substance and Sexual Activity    Alcohol use: Not Currently    Drug use: Never     Social Determinants of Health     Food Insecurity: No Food Insecurity (12/21/2023)    Food Insecurity     Food Insecurity: Never true   Transportation Needs: No Transportation Needs (12/21/2023)     Transportation Needs     Lack of Transportation: No   Physical Activity: Inactive (2/25/2021)    Received from [facility]    Exercise Vital Sign     Days of Exercise per Week: 0 days     Minutes of Exercise per Session: 0 min   Housing Stability: Low Risk  (12/21/2023)    Housing Stability     Housing Instability: No              Review of Systems    Positive for stated complaint: Fall  Other systems are as noted in HPI.  Constitutional and vital signs reviewed.      All other systems reviewed and negative except as noted above.    Physical Exam     ED Triage Vitals   BP 05/26/24 1344 136/80   Pulse 05/26/24 1344 117   Resp 05/26/24 1344 14   Temp 05/26/24 1401 98.7 °F (37.1 °C)   Temp src 05/26/24 1401 Oral   SpO2 05/26/24 1344 96 %   O2 Device 05/26/24 1344 None (Room air)       Current:/80   Pulse 111   Temp 98.7 °F (37.1 °C) (Oral)   Resp 22   Ht 160 cm (5' 3\")   Wt 49.9 kg   SpO2 95%   BMI 19.49 kg/m²         General:  Vitals as listed.  No acute distress   HEENT: Sclerae anicteric.  Conjunctivae show no pallor.  Oropharynx clear, mucous membranes moist  Neck: No posterior tenderness.  Chest: No tenderness  Lungs: Decreased air exchange and clear   Heart: regular rate rhythm and no murmur  Back: No spinous process tenderness.  Abdomen: Soft and nontender.  Pelvis: Stable without tenderness upon rocking.  Extremities: Atraumatic.  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   LOCATION:  [facility]   Dictated by (CST): Jamaal Mireles MD on 5/26/2024 at 4:20 PM     Finalized by (CST): Jamaal Mireles MD on 5/26/2024 at 4:22 PM       XR  LUMBAR SPINE (MIN 4 VIEWS) (CPT=72110)    Result Date: 5/26/2024  CONCLUSION:   1. Age-indeterminate compression fracture of the superior endplate of L2 which is mild.  If there is concern for an acute fracture, further evaluation with MRI lumbar spine would be recommended.   2. Moderate multilevel degenerative changes of the lumbar spine.   LOCATION:  [facility]   Dictated by (CST): Jamaal Mireles MD on 5/26/2024 at 4:19 PM     Finalized by (CST): Jamaal Mireles MD on 5/26/2024 at 4:20 PM        I independently read the radiographs and arthritis of lumbar spine and right hip.  EKG    Rate, intervals and axes as noted on EKG Report.  Rate: 119  Rhythm: Sinus Rhythm  Reading: Very frequent PVCs. QRST morphology similar to EKG done 12/22/2023           ED COURSE and MDM     Sources of the medical history included the patient and her daughter who accompanies her.    The daughter relayed that she is having a lot of difficulty managing the patient at home.  I recommend that  evaluate the patient for SNF.    I reviewed prior external notes including echocardiogram done 12/22/2023 that showed an ejection fraction of 30 to 35% with diffuse hypokinesis    The staff tried to eat the patient up and she is struggling due to back pain.  She cannot ambulate at this time.    With Shyam Morales hospitalist.    I have discussed with the patient the results of testing, differential diagnosis, and treatment plan. They expressed clear understanding of these instructions and agrees to the plan provided.    Disposition and Plan     Clinical Impression:  1. Closed compression fracture of L2 vertebra, initial encounter ([facility])    2. Unable to ambulate         Disposition:  Admit  5/26/2024  4:24 pm    Follow-up:  No follow-up provider specified.      Medications Prescribed:  Current Discharge Medication List          Signed by Demond Anderson MD on 5/26/2024  4:36 PM         MEDICATIONS ADMINISTERED IN LAST 1  DAY:  acetaminophen (Tylenol Extra Strength) tab 1,000 mg       Date Action Dose Route User    5/29/2024 1033 Given 1,000 mg Oral Zara Gabriel RN    5/28/2024 2045 Given 1,000 mg Oral Emerald Solomon RN          atorvastatin (Lipitor) tab 20 mg       Date Action Dose Route User    5/28/2024 2045 Given 20 mg Oral Emerald Solomon RN          calcium carbonate-vitamin D (Oyster Shell-D) 250-3.125 MG-MCG per tab 2 tablet       Date Action Dose Route User    5/29/2024 1035 Given 2 tablet Oral Zara Gabriel RN          isosorbide mononitrate ER (Imdur) 24 hr tab 30 mg       Date Action Dose Route User    5/29/2024 1035 Given 30 mg Oral Zara Gabriel RN          metoprolol succinate ER (Toprol XL) 24 hr tab 25 mg       Date Action Dose Route User    5/29/2024 0601 Given 25 mg Oral Emerald Solomon RN          nystatin (Mycostatin) 847999 UNIT/ML oral suspension 500,000 Units       Date Action Dose Route User    5/29/2024 0900 Given 500,000 Units Oral aZra Gabriel RN    5/28/2024 2045 Given 500,000 Units Oral Emerald Solomon RN          propofol (Diprivan) 10 MG/ML injection       Date Action Dose Route User    5/28/2024 1612 Given 120 mg Intravenous Greg Sterling MD    5/28/2024 1609 Given 30 mg Intravenous Greg Sterling MD          sennosides (Senokot) tab 17.2 mg       Date Action Dose Route User    5/28/2024 2045 Given 17.2 mg Oral Emerald Solomon RN          sodium chloride 0.9% infusion       Date Action Dose Route User    5/29/2024 0728 New Bag (none) Intravenous Emerald Solomon RN    5/28/2024 1800 New Bag (none) Intravenous Katie Crook RN    5/28/2024 1720 Restarted (none) Intravenous Katie Crook RN    5/28/2024 1607 Continued by Anesthesia (none) Intravenous Greg Sterling MD            Vitals (last day)       Date/Time Temp Pulse Resp BP SpO2 Weight O2 Device O2 Flow Rate (L/min) [employer]    05/29/24 0800 98.1 °F (36.7 °C) 62 16 147/109 96 % -- None (Room air) 0 L/min JM     05/29/24 0600 98 °F (36.7 °C) 56 13 130/63 97 % -- None (Room air) -- SB    05/29/24 0400 98.3 °F (36.8 °C) -- -- 115/58 97 % -- None (Room air) -- SB    05/29/24 0000 97.7 °F (36.5 °C) 67 19 118/59 96 % -- None (Room air) -- SB    05/28/24 2000 98 °F (36.7 °C) 87 19 151/89 97 % -- None (Room air) -- SB    05/28/24 1933 97.6 °F (36.4 °C) 77 21 158/137 -- -- -- -- AR    05/28/24 1815 -- 75 19 -- 94 % -- -- -- DJ    05/28/24 1800 -- 73 20 166/66 95 % -- -- -- DJ    05/28/24 1745 -- 75 18 169/68 94 % -- None (Room air) -- DJ    05/28/24 1730 -- 77 19 -- 97 % -- -- -- DJ    05/28/24 1725 -- 78 20 180/72 98 % -- -- -- DJ    05/28/24 1720 -- 74 21 186/79 -- -- -- -- DJ    05/28/24 1719 -- -- -- -- 97 % -- None (Room air) -- DJ    05/28/24 1719 97.7 °F (36.5 °C) 74 16 184/77 -- -- -- --     05/28/24 0800 97.9 °F (36.6 °C) -- -- -- -- -- None (Room air) -- OR    05/28/24 0600 -- 73 18 -- 99 % 108 lb 3.9 oz -- --     05/28/24 0404 97.5 °F (36.4 °C) 75 24 136/90 99 % -- -- -- XM    05/28/24 0000 98 °F (36.7 °C) 66 15 134/65 -- -- -- --            5/26 H&P  Chief Complaint: back pain        Subjective:  History of Present Illness:      Tierra Anderson is a 89 year old female with pmhx of CAD, HTN, HLD who presents from home with complaint of back pain. Pt is a poor historian and limited d/t pain at time of encounter. History obtained from ED signout, chart review, and daughter at bedside. Daughter reports pt was just released from OSH ([facility]) a few days ago after a fall and was told she \"had a mild heart attack\". She was treated supportively and her cardiac medication regimen was adjusted. She was doing well yesterday at home after discharge. This morning, she felt weak and slid off the couch. She did not hit her head or lose consciousness. Denies any chest pain/pressure, SOB/TOUSSAINT, palpitations, abdominal pain, n/v/d, fevers/chills, urinary symptoms. She was able to pull herself back up onto the cough, but had  severe L sided back pain. She asked her daughter to call 911 d/t severity of pain.           Assessment & Plan:  #Intractable back pain  #Mechanical fall  #Inability to ambulate  #L2 compression fracture  - pain control, anti-emetics PRN  - PT/OT     #Acute kidney injury  - gentle IVF  - hold NSAIDs, ACEi     #Hyperglycemia, suspect reactive     #HTN  - metoprolol  - hold lisinopril d/t WESLEY > resume in am if renal function improving     #HLD  - statin    #CAD  - aspirin, statin           Plan of care discussed with pt, pt's daughter, ED    5/28 HOSPITALIST NOTE    Chief Complaint: back pain        Subjective:  More confused this morning, but knows she's in the hospital and states \"I don't like it here and I'm never coming back!'. Moving all extremities spontaneously, but difficulty following commands       Vital signs:  Temp:  [97.5 °F (36.4 °C)-98.6 °F (37 °C)] 97.9 °F (36.6 °C)  Pulse:  [66-92] 73  Resp:  [15-24] 18  BP: ()/(65-90) 136/90  SpO2:  [94 %-99 %] 99 %     Physical Exam:    General: No acute distress, frail/elderly appearing, confused  Respiratory: No wheezes, no rhonchi  Cardiovascular: S1, S2, regular rate and rhythm  Abdomen: Soft, Non-tender, non-distended, positive bowel sounds  Neuro: No new focal deficits.   Extremities: No edema     Diagnostic Data:    Labs:      Recent Labs   Lab 05/27/24  0409   WBC 11.5*   HGB 14.5   MCV 90.3   .0*              Recent Labs   Lab 05/27/24  0409 05/28/24  0752   *  --    BUN 30*  --    CREATSERUM 0.94  --    CA 9.1  --      --    K 3.8 4.2     --    CO2 25.0  --              Assessment & Plan:  #Sub-acute R frontoparietal lobe infarct with possible hemorrhagic conversion  - CTH reviewed  - MRI/MRA brain non-diagnostic > repeat MR imaging ordered with anesthesia  - stroke protocol  - echo ordered  - heparin ppx/aspirin discontinued  - neurology consult placed  - PT/OT/ST     #Intractable back pain  #Mechanical fall  #Inability to  ambulate  #L2 compression fracture  - pain control, anti-emetics PRN  - PT/OT     #Acute kidney injury  - improving with gentle IVF  - hold NSAIDs     #Hyperglycemia, suspect reactive     #HTN  - metoprolol  - imdur     #HLD  - statin    #CAD  - hold aspirin d/t hemorrhagic conversion CVA  - statin     5/28 NEUROSURGERY CONSULT NOTE    REASON FOR CONSULTATION:  Closed compression fracture of L2 vertebra, initial encounter (HCC)  Right frontoparietal infarct with possible hemorrhagic conversion     HISTORY OF PRESENT ILLNESS      Tierra Anderson is a pleasant 89 year old female with PMH of HTN, HLD, cardiomyopathy, bilateral carotid disease, and CAD s/p stent who presented to ED following a witnessed fall at her daughter's home, where she resides. Pt was reportedly able to stand without assistance but reported severe back pain following the fall thus her friend brought her to ED. XR lumbar spine revealed a mild age-indeterminate compression fracture of the superior endplate of L2. CT head completed on evening of 5/27 in setting of increased confusion shows an acute-subacute infarct within the right frontoparietal lobe with hemorrhagic conversion. Neurosurgery is consulted for this. Subsequent MRI/MRA brain is limited d/t significant motion artifact and is ordered for repeat. On exam, patient is drowsy but arousable. She is confused. She has no complaints. Of note, pt was recently admitted to [facility] for abnormal cardiac enzymes - hospital notes are unavailable for review. She does take baby Aspirin daily. At this time, pt denies headache, dizziness, visual disturbance, changes in speech, numbness, paresthesias, or focal weakness.       PHYSICAL EXAMINATION      VITALS: /90 (BP Location: Left arm)   Pulse 73   Temp 97.9 °F (36.6 °C) (Oral)   Resp 18   Ht 63\"   Wt 108 lb 3.9 oz (49.1 kg)   SpO2 99%   BMI 19.17 kg/m²      GENERAL:  No acute distress, non-toxic appearing, speech fluent, mood  appropriate     HEENT:  Normocephalic, atraumatic     RESP: Non-labored, easy, even     CV: NSR on tele     NEUROLOGICAL:  Alert to self and month only.  Speech fluent. Able to follow most simple commands. PERRLA +3 brisk,  EOMI.  VFF.  Face is symmetrical. CN II-XII grossly intact. Sensation to light touch is intact bilaterally.  No drift RUE or BLE. LETTY LUE drift due to proximal LUE weakness. Gait deferred.        DIAGNOSTIC DATA            Lab Results   Component Value Date     K 4.2 05/28/2024     PGLU 102 05/28/2024        ASSESSMENT & PLAN      ASSESSMENT:  Right frontoparietal infarct with hemorrhagic conversion  Age-indeterminate L2 compression fracture   LUE weakness  AMS     PLAN:  No acute surgical intervention is indicated at this time  Repeat MRI/MRA brain pending   Medical management per hospitalist  PT/OT/ST  Stroke management and Keppra per Neurology  Further Neurosurgical recommendations to follow imaging     5/28 NEUROLOGY CONSULT NOTE    History of Presenting Illness  89 year old female with coronary disease, hypertension, hyperlipidemia consulted for acute stroke.  Patient was initially admitted after a fall and was evaluated by orthopedics for compression fracture of the spine.  Patient was noted to be more confused and disoriented and a CT scan of the head was performed which reported subacute infarct in the right frontoparietal area with hemorrhagic conversion.  MRI performed reported hemorrhagic infarct in the right parietal lobe measuring 4.3 x 3.5 cm.  Also reported separate punctate acute infarcts within the parasagittal frontal lobes.  Patient is currently awake, responsive, answering questions denies to be in any significant pain.  No witnessed episode of seizure noted daughter has noted patient to be intermittently confused.        Source of history was patient's daughter and chart review.    ASSESSMENT/PLAN   89 year old female with:     Left parietal hemorrhagic infarct.  Patient  currently off antiplatelet medications.  MRA of head and neck pending.  Will also start patient on Keppra 500 mg twice daily.  Advised OT/PT/rehab eval.  Systolic blood pressure target 110-1 50.  Will request neurosurgery input.  Encephalopathy.  As mentioned above we will start patient on Keppra for seizure prophylaxis which could be contributing.  Will check EEG, ammonia level and TSH.  OT/PT/rehab eval for neglect, visual field cut.  Fall precaution and seizure precaution advised..  Mild to moderate dementia.  Compression fracture of the spine.  Orthopedic/spine for further management.  Situation discussed with patient's daughter.     Principal Problem:    Closed compression fracture of L2 vertebra, initial encounter ([facility])  Active Problems:    Inability to ambulate due to multiple joints    Intractable back pain    WESLEY (acute kidney injury) ([facility])    Cerebrovascular accident (CVA) ([facility])       Impression:  This is a Abnormal EEG.    Mild diffuse slowing into delta and theta range was noted.  This constellation of findings can be seen in encephalopathy due to metabolic/toxic etiology, medication effects or diffuse cerebral injury.  No focal, lateralized or generalized epileptiform activity seen.  Clinical correlation is recommended.     5/29 HOSPITALIST NOTE    Vital signs:  Temp:  [97.6 °F (36.4 °C)-98.3 °F (36.8 °C)] 98.1 °F (36.7 °C)  Pulse:  [56-87] 62  Resp:  [13-21] 16  BP: (115-186)/() 147/109  SpO2:  [94 %-98 %] 96 %     Physical Exam:    General: No acute distress, frail/elderly appearing, slightly confused  Respiratory: No wheezes, no rhonchi  Cardiovascular: S1, S2, regular rate and rhythm  Abdomen: Soft, Non-tender, non-distended, positive bowel sounds  Neuro: Left sided weakness. Has difficulty following commands  Extremities: No edema     Diagnostic Data:    Labs:      Recent Labs   Lab 05/27/24 0409   WBC 11.5*   HGB 14.5   MCV 90.3   .0*           Recent Labs   Lab 05/27/24 0409  05/28/24  0752   *  --    BUN 30*  --    CREATSERUM 0.94  --    CA 9.1  --      --    K 3.8 4.2     --    CO2 25.0  --           Assessment & Plan:  #Sub-acute R frontoparietal lobe infarct with  hemorrhagic conversion  #Left frontal CVA  #Right cerebella CVA  -CTH reviewed  -MRI/MRA brain with anesthesia reviewed. Concern for embolic etiology of CVA  -Stroke protocol  -Echo with bubble study ordered  -Heparin ppx/aspirin discontinued  -Neurology consult placed  -PT/OT/ST     #Intractable back pain  #Mechanical fall  #Inability to ambulate  #L2 compression fracture  -Pain control, anti-emetics PRN  -PMR recommending acute inpatient rehab  -No acute surgical intervention per NSGY     #HTN  -Metoprolol  -Imdur     #HLD  -Statin    #CAD  -Hold aspirin d/t hemorrhagic conversion CVA  -Statin

## 2024-05-30 LAB
GLUCOSE BLD-MCNC: 104 MG/DL (ref 70–99)
GLUCOSE BLD-MCNC: 123 MG/DL (ref 70–99)
GLUCOSE BLD-MCNC: 211 MG/DL (ref 70–99)
GLUCOSE BLD-MCNC: 93 MG/DL (ref 70–99)

## 2024-05-30 PROCEDURE — 99232 SBSQ HOSP IP/OBS MODERATE 35: CPT | Performed by: INTERNAL MEDICINE

## 2024-05-30 RX ORDER — LISINOPRIL 2.5 MG/1
5 TABLET ORAL DAILY
Status: DISCONTINUED | OUTPATIENT
Start: 2024-05-30 | End: 2024-05-31

## 2024-05-30 RX ORDER — METOPROLOL TARTRATE 25 MG/1
25 TABLET, FILM COATED ORAL DAILY
COMMUNITY
Start: 2024-05-03

## 2024-05-30 RX ORDER — SIMVASTATIN 40 MG
40 TABLET ORAL DAILY
COMMUNITY

## 2024-05-30 RX ORDER — LISINOPRIL 5 MG/1
5 TABLET ORAL DAILY
Qty: 30 TABLET | Refills: 0 | Status: SHIPPED | OUTPATIENT
Start: 2024-05-30

## 2024-05-30 NOTE — CM/SW NOTE
05/30/24 0917   Choice of Post-Acute Provider   Informed patient of right to choose their preferred provider Yes   List of appropriate post-acute services provided to patient/family with quality data Yes   Patient/family choice [facility]   Information given to Guardian/HCPOA/Surrogate     STACY received call from pt's HCPOA [place] regarding AR choice. Pt's HCPOA stated he has spoken with pt's dtr and they have decided on [facility] in [place]. SW inquired if pt's HCPOA is certain  is the preferred facility, since pt's HCPOA stated pt's dtr is not fond of . Pt's HCPOA stated pt's dtr lives in [place] and  is conveniently located for her, the other facilities were located in [place], [place], and [place]. Pt's HCPOA stated he has discussed AR facility options with pt's dtr and they are both certain [facility] is selected facility.     [facility] reserved in [facility]. STACY messaged [facility] liaison to initiate insurance authorization for AR. STACY will continue to follow.     [facility]  [id number] [place] [address]    BRIDGETTE Broderick  Discharge Planner

## 2024-05-30 NOTE — PLAN OF CARE
Assumed care at 0730. Pt. A&O x2, on RA, NSR on tele. VSS. Up with Nadia Steady. No c/o pain. Saline locked. Neuro checks Q shift, daily NIH. Upper dentures at bedside. QID accucheck per stroke protocol. Daily weight. Fall and safety precautions in place. Plan of care ongoing.     Problem: NEUROLOGICAL - ADULT  Goal: Achieves stable or improved neurological status  Description: INTERVENTIONS  - Assess for and report changes in neurological status  - Initiate measures to prevent increased intracranial pressure  - Maintain blood pressure and fluid volume within ordered parameters to optimize cerebral perfusion and minimize risk of hemorrhage  - Monitor temperature, glucose, and sodium. Initiate appropriate interventions as ordered  Outcome: Progressing  Goal: Achieves maximal functionality and self care  Description: INTERVENTIONS  - Monitor swallowing and airway patency with patient fatigue and changes in neurological status  - Encourage and assist patient to increase activity and self care with guidance from PT/OT  - Encourage visually impaired, hearing impaired and aphasic patients to use assistive/communication devices  Outcome: Progressing     Problem: SAFETY ADULT - FALL  Goal: Free from fall injury  Description: INTERVENTIONS:  - Assess pt frequently for physical needs  - Identify cognitive and physical deficits and behaviors that affect risk of falls.  - [place] fall precautions as indicated by assessment.  - Educate pt/family on patient safety including physical limitations  - Instruct pt to call for assistance with activity based on assessment  - Modify environment to reduce risk of injury  - Provide assistive devices as appropriate  - Consider OT/PT consult to assist with strengthening/mobility  - Encourage toileting schedule  Outcome: Progressing     Problem: PAIN - ADULT  Goal: Verbalizes/displays adequate comfort level or patient's stated pain goal  Description: INTERVENTIONS:  - Encourage pt to monitor  pain and request assistance  - Assess pain using appropriate pain scale  - Administer analgesics based on type and severity of pain and evaluate response  - Implement non-pharmacological measures as appropriate and evaluate response  - Consider cultural and social influences on pain and pain management  - Manage/alleviate anxiety  - Utilize distraction and/or relaxation techniques  - Monitor for opioid side effects  - Notify MD/LIP if interventions unsuccessful or patient reports new pain  - Anticipate increased pain with activity and pre-medicate as appropriate  Outcome: Progressing     Problem: SKIN/TISSUE INTEGRITY - ADULT  Goal: Skin integrity remains intact  Description: INTERVENTIONS  - Assess and document risk factors for pressure ulcer development  - Assess and document skin integrity  - Monitor for areas of redness and/or skin breakdown  - Initiate interventions, skin care algorithm/standards of care as needed  Outcome: Progressing     Problem: MUSCULOSKELETAL - ADULT  Goal: Maintain proper alignment of affected body part  Description: INTERVENTIONS:  - Support and protect limb and body alignment per provider's orders  - Instruct and reinforce with patient and family use of appropriate assistive device and precautions (e.g. spinal or hip dislocation precautions)  Outcome: Progressing     Problem: Impaired Cognition  Goal: Patient will exhibit improved attention, thought processing and/or memory  Description: Interventions:    Outcome: Progressing

## 2024-05-30 NOTE — PLAN OF CARE
Assumed pt care at 1930  Pt is A&Ox2, following commands.   Pt on room air, VSS.  NSR  Pt denies pain.  Pt max assist.   Pt on regular diet. Tolerating diet.   Left upper  saline locked  Neuro check q4  Head of bed 30 degrees or more  Seizure precautions  Non card elec protocol  Bed in lowest position, call light in reach.     Problem: Patient/Family Goals  Goal: Patient/Family Long Term Goal  Description: Patient's Long Term Goal: No pain    Interventions:  - Pain meds PRN   - Repositioning  - PT/OT  - See additional Care Plan goals for specific interventions     Outcome: Progressing  Goal: Patient/Family Short Term Goal  Description: Patient's Short Term Goal: Less pain    Interventions:   - Pain meds PRN   - Repositioning  - PT/OT  - See additional Care Plan goals for specific interventions     Outcome: Progressing     Problem: NEUROLOGICAL - ADULT  Goal: Achieves stable or improved neurological status  Description: INTERVENTIONS  - Assess for and report changes in neurological status  - Initiate measures to prevent increased intracranial pressure  - Maintain blood pressure and fluid volume within ordered parameters to optimize cerebral perfusion and minimize risk of hemorrhage  - Monitor temperature, glucose, and sodium. Initiate appropriate interventions as ordered  Outcome: Progressing  Goal: Achieves maximal functionality and self care  Description: INTERVENTIONS  - Monitor swallowing and airway patency with patient fatigue and changes in neurological status  - Encourage and assist patient to increase activity and self care with guidance from PT/OT  - Encourage visually impaired, hearing impaired and aphasic patients to use assistive/communication devices  Outcome: Progressing     Problem: SAFETY ADULT - FALL  Goal: Free from fall injury  Description: INTERVENTIONS:  - Assess pt frequently for physical needs  - Identify cognitive and physical deficits and behaviors that affect risk of falls.  - [place] fall  precautions as indicated by assessment.  - Educate pt/family on patient safety including physical limitations  - Instruct pt to call for assistance with activity based on assessment  - Modify environment to reduce risk of injury  - Provide assistive devices as appropriate  - Consider OT/PT consult to assist with strengthening/mobility  - Encourage toileting schedule  Outcome: Progressing     Problem: PAIN - ADULT  Goal: Verbalizes/displays adequate comfort level or patient's stated pain goal  Description: INTERVENTIONS:  - Encourage pt to monitor pain and request assistance  - Assess pain using appropriate pain scale  - Administer analgesics based on type and severity of pain and evaluate response  - Implement non-pharmacological measures as appropriate and evaluate response  - Consider cultural and social influences on pain and pain management  - Manage/alleviate anxiety  - Utilize distraction and/or relaxation techniques  - Monitor for opioid side effects  - Notify MD/LIP if interventions unsuccessful or patient reports new pain  - Anticipate increased pain with activity and pre-medicate as appropriate  Outcome: Progressing     Problem: SKIN/TISSUE INTEGRITY - ADULT  Goal: Skin integrity remains intact  Description: INTERVENTIONS  - Assess and document risk factors for pressure ulcer development  - Assess and document skin integrity  - Monitor for areas of redness and/or skin breakdown  - Initiate interventions, skin care algorithm/standards of care as needed  Outcome: Progressing     Problem: MUSCULOSKELETAL - ADULT  Goal: Maintain proper alignment of affected body part  Description: INTERVENTIONS:  - Support and protect limb and body alignment per provider's orders  - Instruct and reinforce with patient and family use of appropriate assistive device and precautions (e.g. spinal or hip dislocation precautions)  Outcome: Progressing

## 2024-05-30 NOTE — PHYSICAL THERAPY NOTE
PHYSICAL THERAPY TREATMENT NOTE - INPATIENT    Room Number: 3616/3616-A     Session: 2     Number of Visits to Meet Established Goals: 5    Presenting Problem: s/p fall off couch, Closed compression ofx of L2 vertebra, Acute CVA  Co-Morbidities : CAD, HTN, scoliosis (per xray L spine)    PHYSICAL THERAPY MEDICAL/SOCIAL HISTORY  History related to current admission: Patient is a 89 year old female admitted on 5/26/2024 from home for fall off couch with c/o back pain.  Pt diagnosed with L2 compression fracture.     HOME SITUATION  Type of Home: House   Home Layout: One level  Stairs to Enter : 2     Lives With: Alone  Drives: Yes  Patient Owned Equipment: Rolling walker  Patient Regularly Uses: Reading glasses     Prior Level of Anna:   Pt questionable historian- reported she lived in house with her spouse, ambulates with rollator, unable to tell much more.  Writer called daughter (Itzel)- pt lives in [facility], stays on main floor of home. Lives alone, but dtr (Itzel) visits everyday. Supposed to ambulate with RW at all times. Up until now she was doing everything on her own- sponge bathing, dressing, etc.   Drives- just passed her drivers test and renewed her license.   Does laundry one in a while, otherwise dtr does it for her. Daughter brings over meals each day.   Writes her own checks for her bills.   Had recent admission at [facility] for \"mild heart attack\" (per dtr) and had a change in meds.    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   XR LUMBAR SPINE:   There is mild to moderate levoscoliosis of the lumbar spine.  There is no acute fracture dislocation.  There is diffuse osteopenia.  There is moderate degenerative disc disease throughout the lumbar spine most pronounced at L3-4.  There is mild to   moderate facet joint degenerative changes on the right greater than left at L3-4 L4-5 and L5-S1.  There is mild depression of  the superior endplate of L2 which is an age indeterminate compression fracture.     5/27 MRI Brain    FINDINGS:       Examination is essentially nondiagnostic.  There is a hemorrhagic infarct within the right parietal lobe as noted on the head CT from earlier on 5/27/2024.  This measures 4.3 x 3.5 cm in the axial plane.      There are separate punctate acute infarcts within the parasagittal frontal lobes.      There is moderate prominence of the lateral ventricles.      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  A component this finding could be developmental.      Chronic sphenoid sinusitis.       ASSESSMENT   Patient demonstrates fair progress this session, goals  remain in progress.    Patient continues to function below baseline with bed mobility, transfers, and gait.  Contributing factors to remaining limitations include decreased endurance/aerobic capacity, pain, impaired Standing and sitting balance, impaired coordination, impaired motor planning, decreased muscular endurance, and cognitive deficits (delayed processing/Lime).  Next session anticipate patient to progress bed mobility, transfers, and gait.  Physical Therapy will continue to follow patient for duration of hospitalization.    Patient continues to benefit from continued skilled PT services: to facilitate return to prior level of function as patient demonstrates high motivation with excellent tolerance to an intensive therapy program .    PT Treatment Plan: Bed mobility;Body mechanics;Coordination;Endurance;Energy conservation;Patient education;Family education;Gait training;Range of motion;Neuromuscular re-educate;Strengthening;Stoop training;Stair training;Transfer training;Balance training  Rehab Potential : Fair  Frequency (Obs): 3-5x/week    CURRENT GOALS     Goal #1 Patient is able to demonstrate supine - sit EOB @ level: supervision      Goal #2 Patient is able to demonstrate transfers Sit to/from Stand at assistance level: supervision       Goal #3 Patient is able to ambulate 50 feet with assist device: walker - rolling at assistance level: minimum assistance      Goal #4     Goal #5     Goal #6     Goal Comments: Goals established on 2024 all goals ongoing    SUBJECTIVE  \"Get to the chair?\"    OBJECTIVE  Precautions: Bed/chair alarm;Spine    WEIGHT BEARING RESTRICTION  Weight Bearing Restriction: None                PAIN ASSESSMENT   Ratin  Location: denied       BALANCE                                                                                                                       Static Sitting: Fair -  Dynamic Sitting: Poor +           Static Standing: Poor  Dynamic Standing: Poor    ACTIVITY TOLERANCE                         O2 WALK         AM-PAC '6-Clicks' INPATIENT SHORT FORM - BASIC MOBILITY  How much difficulty does the patient currently have...  Patient Difficulty: Turning over in bed (including adjusting bedclothes, sheets and blankets)?: A Lot   Patient Difficulty: Sitting down on and standing up from a chair with arms (e.g., wheelchair, bedside commode, etc.): A Lot   Patient Difficulty: Moving from lying on back to sitting on the side of the bed?: A Lot   How much help from another person does the patient currently need...   Help from Another: Moving to and from a bed to a chair (including a wheelchair)?: A Lot   Help from Another: Need to walk in hospital room?: A Lot   Help from Another: Climbing 3-5 steps with a railing?: Total       AM-PAC Score:  Raw Score: 11   Approx Degree of Impairment: 72.57%   Standardized Score (AM-PAC Scale): 33.86   CMS Modifier (G-Code): CL    FUNCTIONAL ABILITY STATUS  Gait Assessment   Functional Mobility/Gait Assessment  Gait Assistance: Not tested  Distance (ft): 0    Skilled Therapy Provided    Bed Mobility:  Rolling: Max A   Supine<>Sit: Max A (HOB flat)  Sit<>Supine:     Transfer Mobility:  Sit<>Stand: Max A (posterior lean)  Stand<>Sit: Max A   Gait: NT    Therapist's  Comments:     Pt able to safely stand with Max A x 1 to Selam Steady    Continued L side inattention, LUE/LLE chorea like movements and inability to maintain LLE in contact with floor/surface of lift.     Pt able to stand x 4 attempts to selam steady d/t incontinence - Pt would require Max A - Total A for static standing balance and increased chore-like movements/ataxia.      Pt is unsafe to ambulate at this time, or pivot to chair - recommend selam steady.  PCT Fortino witnessed use of lift, and reported confidence in usage.       Patient End of Session: Up in chair;Needs met;Call light within reach;RN aware of session/findings;All patient questions and concerns addressed;Alarm set    PT Session Time: 23 minutes  Therapeutic Activity: 15 minutes  NMRE: 8 minutes

## 2024-05-30 NOTE — PROGRESS NOTES
[facility]   part of [facility]     Hospitalist Progress Note     Tierra Anderson Patient Status:  Observation    1927 MRN [id number]   Location [facility] 3SW-A Attending Kathy Leal, DO   Hosp Day # 2 PCP RAMON WU     Chief Complaint: Back pain    Subjective:     Patient denies pain. She has no complaints.     Objective:    Review of Systems:   A comprehensive review of systems was completed; pertinent positive and negatives stated in subjective.    Vital signs:  Temp:  [97.3 °F (36.3 °C)-98.4 °F (36.9 °C)] 97.3 °F (36.3 °C)  Pulse:  [54-75] 63  Resp:  [11-22] 22  BP: (126-179)/(55-82) 149/80  SpO2:  [95 %-98 %] 98 %    Physical Exam:    General: No acute distress, awake and alert  Respiratory: No wheezes, no rhonchi  Cardiovascular: S1, S2, regular rate and rhythm  Abdomen: Soft, Non-tender, non-distended, positive bowel sounds  Neuro: Left sided weakness, mild and improved from yesterday  Extremities: No edema    Diagnostic Data:    Labs:  Recent Labs   Lab 24  0409 24  1031   WBC 11.5* 8.1   HGB 14.5 12.8   MCV 90.3 90.2   .0* 133.0*     Recent Labs   Lab 24  0409 24  0752 24  1031   *  --  147*   BUN 30*  --  25*   CREATSERUM 0.94  --  0.79   CA 9.1  --  8.7     --  138   K 3.8 4.2 3.9     --  111   CO2 25.0  --  23.0     Estimated Creatinine Clearance: 32.3 mL/min (based on SCr of 0.79 mg/dL).    No results for input(s): \"TROP\", \"TROPHS\", \"CK\" in the last 168 hours.    No results for input(s): \"PTP\", \"INR\" in the last 168 hours.         Microbiology  No results found for this visit on 24.    Imaging: Reviewed in Epic.    Medications:    nystatin  5 mL Oral QID    isosorbide mononitrate ER  30 mg Oral Daily    calcium carbonate-vitamin D  2 tablet Oral TID CC    sennosides  17.2 mg Oral Nightly    atorvastatin  20 mg Oral Nightly    metoprolol succinate ER  25 mg Oral Daily Beta Blocker    acetaminophen  1,000  mg Oral TID    Or    HYDROcodone-acetaminophen  1 tablet Oral TID    Or    HYDROcodone-acetaminophen  2 tablet Oral TID       Assessment & Plan:      #Sub-acute R frontoparietal lobe infarct with  hemorrhagic conversion  #Left frontal CVA  #Right cerebella CVA  -CTH reviewed  -MRI/MRA brain with anesthesia reviewed. Concern for embolic etiology of CVA  -Stroke protocol  -Echo with bubble study ordered  -Heparin ppx/aspirin discontinued. She would benefit from NOAC but high fall risk. Neuro discussed with daughter and plan to hold off. Repeat CT scan in 3 weeks  -Keppra for seizure ppx  -Neurology consult   -PT/OT/ST plan for acute rehab    #Mechanical fall  #L2 compression fracture  -Pain control, anti-emetics PRN  -PMR recommending acute inpatient rehab  -No acute surgical intervention per NSGY. LSO brace ordered     #HTN  -Metoprolol  -Imdur  -Add low dose lisinopril     #HLD  -Statin    #CAD  -Hold aspirin d/t hemorrhagic conversion CVA  -Statin    #Dementia       Erwin Rivas,     Supplementary Documentation:     Quality:  DVT Mechanical Prophylaxis:   SCDs,    DVT Pharmacologic Prophylaxis   Medication   None     Code Status: Full Code  Gibbons: External urinary catheter in place  GISSELL: Medically ready    Discharge is dependent on: Placement, consultant recs  At this point Ms. Anderson is expected to be discharge to: Rehab    The 21st Century Cures Act makes medical notes like these available to patients in the interest of transparency. Please be advised this is a medical document. Medical documents are intended to carry relevant information, facts as evident, and the clinical opinion of the practitioner. The medical note is intended as peer to peer communication and may appear blunt or direct. It is written in medical language and may contain abbreviations or verbiage that are unfamiliar.

## 2024-05-31 ENCOUNTER — APPOINTMENT (OUTPATIENT)
Dept: CT IMAGING | Facility: HOSPITAL | Age: 89
DRG: 064 | End: 2024-05-31
Attending: INTERNAL MEDICINE
Payer: MEDICARE

## 2024-05-31 VITALS
OXYGEN SATURATION: 99 % | BODY MASS INDEX: 19.18 KG/M2 | DIASTOLIC BLOOD PRESSURE: 69 MMHG | TEMPERATURE: 98 F | HEART RATE: 56 BPM | HEIGHT: 63 IN | RESPIRATION RATE: 20 BRPM | SYSTOLIC BLOOD PRESSURE: 129 MMHG | WEIGHT: 108.25 LBS

## 2024-05-31 LAB
ANION GAP SERPL CALC-SCNC: 6 MMOL/L (ref 0–18)
BASOPHILS # BLD AUTO: 0.05 X10(3) UL (ref 0–0.2)
BASOPHILS NFR BLD AUTO: 0.7 %
BUN BLD-MCNC: 19 MG/DL (ref 9–23)
CALCIUM BLD-MCNC: 9.6 MG/DL (ref 8.5–10.1)
CHLORIDE SERPL-SCNC: 107 MMOL/L (ref 98–112)
CO2 SERPL-SCNC: 26 MMOL/L (ref 21–32)
CREAT BLD-MCNC: 0.76 MG/DL
EGFRCR SERPLBLD CKD-EPI 2021: 72 ML/MIN/1.73M2 (ref 60–?)
EOSINOPHIL # BLD AUTO: 0.22 X10(3) UL (ref 0–0.7)
EOSINOPHIL NFR BLD AUTO: 3 %
ERYTHROCYTE [DISTWIDTH] IN BLOOD BY AUTOMATED COUNT: 14.7 %
GLUCOSE BLD-MCNC: 111 MG/DL (ref 70–99)
GLUCOSE BLD-MCNC: 125 MG/DL (ref 70–99)
GLUCOSE BLD-MCNC: 128 MG/DL (ref 70–99)
HCT VFR BLD AUTO: 39.7 %
HGB BLD-MCNC: 12.8 G/DL
IMM GRANULOCYTES # BLD AUTO: 0.04 X10(3) UL (ref 0–1)
IMM GRANULOCYTES NFR BLD: 0.5 %
LYMPHOCYTES # BLD AUTO: 1.36 X10(3) UL (ref 1–4)
LYMPHOCYTES NFR BLD AUTO: 18.7 %
MCH RBC QN AUTO: 30 PG (ref 26–34)
MCHC RBC AUTO-ENTMCNC: 32.2 G/DL (ref 31–37)
MCV RBC AUTO: 93 FL
MONOCYTES # BLD AUTO: 0.73 X10(3) UL (ref 0.1–1)
MONOCYTES NFR BLD AUTO: 10 %
NEUTROPHILS # BLD AUTO: 4.88 X10 (3) UL (ref 1.5–7.7)
NEUTROPHILS # BLD AUTO: 4.88 X10(3) UL (ref 1.5–7.7)
NEUTROPHILS NFR BLD AUTO: 67.1 %
OSMOLALITY SERPL CALC.SUM OF ELEC: 292 MOSM/KG (ref 275–295)
PLATELET # BLD AUTO: 179 10(3)UL (ref 150–450)
POTASSIUM SERPL-SCNC: 3.9 MMOL/L (ref 3.5–5.1)
RBC # BLD AUTO: 4.27 X10(6)UL
SODIUM SERPL-SCNC: 139 MMOL/L (ref 136–145)
WBC # BLD AUTO: 7.3 X10(3) UL (ref 4–11)

## 2024-05-31 PROCEDURE — 99233 SBSQ HOSP IP/OBS HIGH 50: CPT | Performed by: OTHER

## 2024-05-31 PROCEDURE — 99239 HOSP IP/OBS DSCHRG MGMT >30: CPT | Performed by: INTERNAL MEDICINE

## 2024-05-31 PROCEDURE — 70450 CT HEAD/BRAIN W/O DYE: CPT | Performed by: INTERNAL MEDICINE

## 2024-05-31 RX ORDER — MEMANTINE HYDROCHLORIDE 5 MG/1
5 TABLET ORAL DAILY
Qty: 30 TABLET | Refills: 3 | Status: SHIPPED | OUTPATIENT
Start: 2024-05-31

## 2024-05-31 RX ORDER — LEVETIRACETAM 500 MG/1
500 TABLET ORAL 2 TIMES DAILY
Qty: 60 TABLET | Refills: 3 | Status: SHIPPED | OUTPATIENT
Start: 2024-05-31

## 2024-05-31 NOTE — PLAN OF CARE
Problem: Patient/Family Goals  Goal: Patient/Family Long Term Goal  Description: Patient's Long Term Goal: No pain    Interventions:  - Pain meds PRN   - Repositioning  - PT/OT  - See additional Care Plan goals for specific interventions     Outcome: Adequate for Discharge  Goal: Patient/Family Short Term Goal  Description: Patient's Short Term Goal: Less pain    Interventions:   - Pain meds PRN   - Repositioning  - PT/OT  - See additional Care Plan goals for specific interventions     Outcome: Adequate for Discharge     Problem: NEUROLOGICAL - ADULT  Goal: Achieves stable or improved neurological status  Description: INTERVENTIONS  - Assess for and report changes in neurological status  - Initiate measures to prevent increased intracranial pressure  - Maintain blood pressure and fluid volume within ordered parameters to optimize cerebral perfusion and minimize risk of hemorrhage  - Monitor temperature, glucose, and sodium. Initiate appropriate interventions as ordered  5/31/2024 1714 by Love Hernandez RN  Outcome: Adequate for Discharge  5/31/2024 1713 by Love Hernandez RN  Outcome: Progressing  5/31/2024 1159 by Love Hernandez RN  Outcome: Progressing  Goal: Achieves maximal functionality and self care  Description: INTERVENTIONS  - Monitor swallowing and airway patency with patient fatigue and changes in neurological status  - Encourage and assist patient to increase activity and self care with guidance from PT/OT  - Encourage visually impaired, hearing impaired and aphasic patients to use assistive/communication devices  5/31/2024 1714 by Love Hernandez RN  Outcome: Adequate for Discharge  5/31/2024 1713 by Love Hernandez RN  Outcome: Progressing  5/31/2024 1159 by Love Hernandez RN  Outcome: Progressing     Problem: SAFETY ADULT - FALL  Goal: Free from fall injury  Description: INTERVENTIONS:  - Assess pt frequently for physical needs  - Identify cognitive and physical deficits and behaviors  that affect risk of falls.  - [place] fall precautions as indicated by assessment.  - Educate pt/family on patient safety including physical limitations  - Instruct pt to call for assistance with activity based on assessment  - Modify environment to reduce risk of injury  - Provide assistive devices as appropriate  - Consider OT/PT consult to assist with strengthening/mobility  - Encourage toileting schedule  5/31/2024 1714 by Love Hernandez RN  Outcome: Adequate for Discharge  5/31/2024 1713 by Love Hernandez RN  Outcome: Progressing  5/31/2024 1159 by Love Hernandez RN  Outcome: Progressing     Problem: PAIN - ADULT  Goal: Verbalizes/displays adequate comfort level or patient's stated pain goal  Description: INTERVENTIONS:  - Encourage pt to monitor pain and request assistance  - Assess pain using appropriate pain scale  - Administer analgesics based on type and severity of pain and evaluate response  - Implement non-pharmacological measures as appropriate and evaluate response  - Consider cultural and social influences on pain and pain management  - Manage/alleviate anxiety  - Utilize distraction and/or relaxation techniques  - Monitor for opioid side effects  - Notify MD/LIP if interventions unsuccessful or patient reports new pain  - Anticipate increased pain with activity and pre-medicate as appropriate  5/31/2024 1714 by Love Hernandez RN  Outcome: Adequate for Discharge  5/31/2024 1713 by Love Hernandez RN  Outcome: Progressing  5/31/2024 1159 by Love Hernandez RN  Outcome: Progressing     Problem: SKIN/TISSUE INTEGRITY - ADULT  Goal: Skin integrity remains intact  Description: INTERVENTIONS  - Assess and document risk factors for pressure ulcer development  - Assess and document skin integrity  - Monitor for areas of redness and/or skin breakdown  - Initiate interventions, skin care algorithm/standards of care as needed  5/31/2024 1714 by Love Hernandez RN  Outcome: Adequate for  Discharge  5/31/2024 1713 by Love Hernandez RN  Outcome: Progressing  5/31/2024 1159 by Love Hernandez RN  Outcome: Progressing     Problem: MUSCULOSKELETAL - ADULT  Goal: Maintain proper alignment of affected body part  Description: INTERVENTIONS:  - Support and protect limb and body alignment per provider's orders  - Instruct and reinforce with patient and family use of appropriate assistive device and precautions (e.g. spinal or hip dislocation precautions)  5/31/2024 1714 by Love Hernandez RN  Outcome: Adequate for Discharge  5/31/2024 1713 by Love Hernandez RN  Outcome: Progressing  5/31/2024 1159 by Love Hernandez RN  Outcome: Progressing     Problem: Impaired Cognition  Goal: Patient will exhibit improved attention, thought processing and/or memory  Description: Interventions:    5/31/2024 1714 by Love Hernandez RN  Outcome: Adequate for Discharge  5/31/2024 1713 by Love Hernandez RN  Outcome: Progressing  5/31/2024 1159 by Love Hernandez RN  Outcome: Progressing

## 2024-05-31 NOTE — PHYSICAL THERAPY NOTE
PHYSICAL THERAPY TREATMENT NOTE - INPATIENT    Room Number: 3616/3616-A       Session: 3     Number of Visits to Meet Established Goals: 5    Presenting Problem: s/p fall off couch, Closed compression ofx of L2 vertebra, Acute CVA  Co-Morbidities : CAD, HTN, scoliosis (per xray L spine)    PHYSICAL THERAPY MEDICAL/SOCIAL HISTORY  History related to current admission: Patient is a 89 year old female admitted on 5/26/2024 from home for fall off couch with c/o back pain.  Pt diagnosed with L2 compression fracture.     HOME SITUATION  Type of Home: House   Home Layout: One level  Stairs to Enter : 2     Lives With: Alone  Drives: Yes  Patient Owned Equipment: Rolling walker  Patient Regularly Uses: Reading glasses     Prior Level of [place]:   Pt questionable historian- reported she lived in house with her spouse, ambulates with rollator, unable to tell much more.  Writer called daughter (Itzel)- pt lives in [facility], stays on main floor of home. Lives alone, but dtr (Itzel) visits everyday. Supposed to ambulate with RW at all times. Up until now she was doing everything on her own- sponge bathing, dressing, etc.   Drives- just passed her drivers test and renewed her license.   Does laundry one in a while, otherwise dtr does it for her. Daughter brings over meals each day.   Writes her own checks for her bills.   Had recent admission at [facility] for \"mild heart attack\" (per dtr) and had a change in meds.    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   XR LUMBAR SPINE:   There is mild to moderate levoscoliosis of the lumbar spine.  There is no acute fracture dislocation.  There is diffuse osteopenia.  There is moderate degenerative disc disease throughout the lumbar spine most pronounced at L3-4.  There is mild to   moderate facet joint degenerative changes on the right greater than left at L3-4 L4-5 and L5-S1.  There is mild depression of  the superior endplate of L2 which is an age indeterminate compression fracture.     5/27 MRI Brain    FINDINGS:       Examination is essentially nondiagnostic.  There is a hemorrhagic infarct within the right parietal lobe as noted on the head CT from earlier on 5/27/2024.  This measures 4.3 x 3.5 cm in the axial plane.      There are separate punctate acute infarcts within the parasagittal frontal lobes.      There is moderate prominence of the lateral ventricles.      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  A component this finding could be developmental.      Chronic sphenoid sinusitis.       ASSESSMENT   Patient demonstrates limited progress this session, goals  remain in progress.    Pt now requiring two person assist for sit<>stand - inconsistent volitional effort noted.      Patient continues to function below baseline with bed mobility, transfers, and gait.  Contributing factors to remaining limitations include decreased endurance/aerobic capacity, pain, impaired Standing and sitting balance, impaired coordination, impaired motor planning, decreased muscular endurance, and cognitive deficits (delayed processing/[place]).  Next session anticipate patient to progress bed mobility, transfers, and gait.  Physical Therapy will continue to follow patient for duration of hospitalization.    Patient continues to benefit from continued skilled PT services: to facilitate return to prior level of function as patient demonstrates high motivation with excellent tolerance to an intensive therapy program .    Plan to re-assess discharge rec if patient remains in hospital d/t inconsistent presentation/participation - discussed with [facility] [place]      PT Treatment Plan: Bed mobility;Body mechanics;Coordination;Endurance;Energy conservation;Patient education;Family education;Gait training;Range of motion;Neuromuscular re-educate;Strengthening;Stoop training;Stair training;Transfer training;Balance training  Rehab Potential :  Fair  Frequency (Obs): 3-5x/week    CURRENT GOALS     Goal #1 Patient is able to demonstrate supine - sit EOB @ level: supervision      Goal #2 Patient is able to demonstrate transfers Sit to/from Stand at assistance level: supervision      Goal #3 Patient is able to ambulate 50 feet with assist device: walker - rolling at assistance level: minimum assistance      Goal #4     Goal #5     Goal #6     Goal Comments: Goals established on 2024 all goals ongoing    SUBJECTIVE  \"Wait where am I\" - Pt tearful and confused upon writer/MCLEOD arrival    Able to re-orientate after some time, but still confused, poor carryover from earlier in session    OBJECTIVE  Precautions: Bed/chair alarm;Spine    WEIGHT BEARING RESTRICTION  Weight Bearing Restriction: None                PAIN ASSESSMENT   Ratin  Location: denied       BALANCE                                                                                                                       Static Sitting: Fair -  Dynamic Sitting: Poor +           Static Standing: Poor  Dynamic Standing: Poor    ACTIVITY TOLERANCE                         O2 WALK         AM-PAC '6-Clicks' INPATIENT SHORT FORM - BASIC MOBILITY  How much difficulty does the patient currently have...  Patient Difficulty: Turning over in bed (including adjusting bedclothes, sheets and blankets)?: A Lot   Patient Difficulty: Sitting down on and standing up from a chair with arms (e.g., wheelchair, bedside commode, etc.): A Lot   Patient Difficulty: Moving from lying on back to sitting on the side of the bed?: A Lot   How much help from another person does the patient currently need...   Help from Another: Moving to and from a bed to a chair (including a wheelchair)?: A Lot   Help from Another: Need to walk in hospital room?: A Lot   Help from Another: Climbing 3-5 steps with a railing?: Total       AM-PAC Score:  Raw Score: 11   Approx Degree of Impairment: 72.57%   Standardized Score  (AM-PAC Scale): 33.86   CMS Modifier (G-Code): CL    FUNCTIONAL ABILITY STATUS  Gait Assessment   Functional Mobility/Gait Assessment  Gait Assistance: Not tested  Distance (ft): 0    Skilled Therapy Provided    Bed Mobility:  Rolling:   Supine<>Sit: Mod A (HOB elevated)  Sit<>Supine:     Transfer Mobility:  Sit<>Stand: Max A x 2 (posterior lean)  Stand<>Sit: Max A x 2   Gait: attempted a few steps with HHA x 2 person - unsafe, and Pt reported \"fear\"    Therapist's Comments:     Continued L side inattention, LUE/LLE chorea like movements and inability to maintain LLE in contact with floor - L foot sliding anteriorly when attempting to stand from BS recliner.         Patient End of Session: Up in chair;Needs met;Call light within reach;RN aware of session/findings;All patient questions and concerns addressed;Alarm set    PT Session Time: 23 minutes  Therapeutic Activity: 15 minutes  Gait Trainin minutes

## 2024-05-31 NOTE — PROGRESS NOTES
History of Presenting Illness:  Patient seen.  Still continues to have vision issues.  Denies any headache, nausea, vomiting, new weakness, numbness, paresthesias.  No seizure-like activity.  Nursing staff felt the patient was more drowsy.  Hospitalist also for the patient was more drowsy than usual.      Vitals:   Vitals:    05/31/24 1208   BP: 129/69   Pulse: 56   Resp: 20   Temp: 98.1 °F (36.7 °C)          Examination:    Awake , non-dysarthric, expressive an receptive language normal.   Pupils round and reactive, right visual field deficit noted , no facial weakness, hearing normal.  Motor strength and reflexes symmetrical.  Finger to Nose testing normal.     Investigations:    CT BRAIN OR HEAD ([id number])    Result Date: 5/31/2024  CONCLUSION:  Hemorrhagic infarct showing slight increase in the overall size of the infarct zone consistent with evolution since the recent prior, with no change in the overall extent of hemorrhage.  No other new finding.    LOCATION:  [facility]   Dictated by (CST): Phil Singh MD on 5/31/2024 at 11:48 AM     Finalized by (CST): Phil Singh MD on 5/31/2024 at 11:52 AM          No results found for: \"A1C\"     Lab Results   Component Value Date    LDL 83 01/25/2024    HDL 94 (H) 01/25/2024    TRIG 81 01/25/2024        Recent Labs   Lab 05/27/24  0409 05/29/24  1031 05/31/24  1216   RBC 4.87 4.29 4.27   HGB 14.5 12.8 12.8   HCT 44.0 38.7 39.7   MCV 90.3 90.2 93.0   MCH 29.8 29.8 30.0   MCHC 33.0 33.1 32.2   RDW 14.7 14.6 14.7   NEPRELIM 9.41* 6.04 4.88   WBC 11.5* 8.1 7.3   .0* 133.0* 179.0       Recent Labs   Lab 05/27/24  0409 05/28/24  0752 05/29/24  1031 05/31/24  1216   *  --  147* 125*   BUN 30*  --  25* 19   CREATSERUM 0.94  --  0.79 0.76   EGFRCR 56*  --  68 72   CA 9.1  --  8.7 9.6     --  138 139   K 3.8 4.2 3.9 3.9     --  111 107   CO2 25.0  --  23.0 26.0         Impression/MDM.    Left parietal infarct with hemorrhagic transformation.  Initially  felt that this may be a hemorrhagic infarct..  Patient currently off antiplatelet medications.  MRA of head and neck reviewed, abnormalities noted.  Continue Keppra 500 mg twice daily.  Advised OT/PT/rehab eval.  Systolic blood pressure target 110-1 50.  Neurosurgery input reviewed.    LDL 51.  Start atorvastatin 20 mg daily.  Encephalopathy.  Primary team and nursing staff felt the patient was more drowsy in the last 24 hours.  Repeat CT scan did not show any evidence of worsening.  If patient's symptoms worsen we will consider repeating EEG.  OT/PT/rehab eval for neglect, visual field cut.  Fall precaution and seizure precaution advised..  Mild to moderate dementia.  Will start memantine 5 mg daily.  Compression fracture of the spine.  Orthopedic/spine for further management.  Patient to have repeat CT scan after 3 weeks for evaluation of hemorrhagic transformation.  No antiplatelet or anticoagulant at this time.

## 2024-05-31 NOTE — CM/SW NOTE
STACY spoke with GRACE Thomas regarding insurance authorization status. Martha stated insurance authorization is still pending at this time. CBC and BMP needed prior to discharge, relayed to RN.     Insurance authorization is still pending at this time. STACY will continue to follow.     Addendum (1:25pm) - STACY received message from GRACE Thomas stating she has received insurance authorization for AR. Martha stated she will request a bed.     Updated RN to confirm medical clearance.     BRIDGETTE Broderick  Discharge Planner

## 2024-05-31 NOTE — PLAN OF CARE
Assumed care of pt at 0730 hr- pt is alert, disoriented, endorses pain, on RA, continuours cardiac and Spo2 monitoring is in place, PIV is saline locked, up with selam stedy x2 assist and back brace in place. Safety precautions are in place.    Stroke protocol, neuro assessment and NIH. Aspiration precautions in place.    Isolation precautions -,   Problem: NEUROLOGICAL - ADULT  Goal: Achieves stable or improved neurological status  Description: INTERVENTIONS  - Assess for and report changes in neurological status  - Initiate measures to prevent increased intracranial pressure  - Maintain blood pressure and fluid volume within ordered parameters to optimize cerebral perfusion and minimize risk of hemorrhage  - Monitor temperature, glucose, and sodium. Initiate appropriate interventions as ordered  Outcome: Progressing  Goal: Achieves maximal functionality and self care  Description: INTERVENTIONS  - Monitor swallowing and airway patency with patient fatigue and changes in neurological status  - Encourage and assist patient to increase activity and self care with guidance from PT/OT  - Encourage visually impaired, hearing impaired and aphasic patients to use assistive/communication devices  Outcome: Progressing     Problem: SAFETY ADULT - FALL  Goal: Free from fall injury  Description: INTERVENTIONS:  - Assess pt frequently for physical needs  - Identify cognitive and physical deficits and behaviors that affect risk of falls.  - [place] fall precautions as indicated by assessment.  - Educate pt/family on patient safety including physical limitations  - Instruct pt to call for assistance with activity based on assessment  - Modify environment to reduce risk of injury  - Provide assistive devices as appropriate  - Consider OT/PT consult to assist with strengthening/mobility  - Encourage toileting schedule  Outcome: Progressing     Problem: PAIN - ADULT  Goal: Verbalizes/displays adequate comfort level or patient's  stated pain goal  Description: INTERVENTIONS:  - Encourage pt to monitor pain and request assistance  - Assess pain using appropriate pain scale  - Administer analgesics based on type and severity of pain and evaluate response  - Implement non-pharmacological measures as appropriate and evaluate response  - Consider cultural and social influences on pain and pain management  - Manage/alleviate anxiety  - Utilize distraction and/or relaxation techniques  - Monitor for opioid side effects  - Notify MD/LIP if interventions unsuccessful or patient reports new pain  - Anticipate increased pain with activity and pre-medicate as appropriate  Outcome: Progressing     Problem: SKIN/TISSUE INTEGRITY - ADULT  Goal: Skin integrity remains intact  Description: INTERVENTIONS  - Assess and document risk factors for pressure ulcer development  - Assess and document skin integrity  - Monitor for areas of redness and/or skin breakdown  - Initiate interventions, skin care algorithm/standards of care as needed  Outcome: Progressing     Problem: MUSCULOSKELETAL - ADULT  Goal: Maintain proper alignment of affected body part  Description: INTERVENTIONS:  - Support and protect limb and body alignment per provider's orders  - Instruct and reinforce with patient and family use of appropriate assistive device and precautions (e.g. spinal or hip dislocation precautions)  Outcome: Progressing     Problem: Impaired Cognition  Goal: Patient will exhibit improved attention, thought processing and/or memory  Description: Interventions:  {Cognition Interventions:[id number]:::0}  Outcome: Progressing

## 2024-05-31 NOTE — DISCHARGE SUMMARY
[place] HOSPITALIST  DISCHARGE SUMMARY     Tierra Anderson Patient Status:  Inpatient    1927 MRN [id number]   Location [facility] 3NE-A Attending Erwin Rivas DO   Hosp Day # 3 PCP RAMON WU     Date of Admission: 2024  Date of Discharge: 2024  Discharge Disposition: Acute rehab    Discharge Diagnosis:   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 Pt is a poor historian and limited d/t pain at time of encounter. History obtained from ED signout, chart review, and daughter at bedside. Daughter reports pt was just released from OSH ([facility]) a few days ago after a fall and was told she \"had a mild heart attack\". She was treated supportively and her cardiac medication regimen was adjusted. She was doing well yesterday at home after discharge. This morning, she felt weak and slid off the couch. She did not hit her head or lose consciousness. Denies any chest pain/pressure, SOB/TOUSSAINT, palpitations, abdominal pain, n/v/d, fevers/chills, urinary symptoms. She was able to pull herself back up onto the cough, but had severe L sided back pain. She asked her daughter to call [phone] d/t severity of pain.     Brief Synopsis: Patient was found to have L2 compression fracture.  Neurosurgery did not recommend any acute surgical intervention and LSO brace was recommended.  Pain improved in the hospital.  She was found to have confusion and imaging showed subacute right parietal lobe infarct with hemorrhagic conversion as well as left frontal CVA and right cerebellar CVA.  Echo showed no shunt.  She would benefit from NOAC but is a high fall risk and neuro discussed this with her daughter and plan is  to hold off on anticoagulation.  Plans for repeat CT scan in 3 weeks as an outpatient.  Until then, antiplatelets and anticoagulation will be held.  She was discharged to acute rehab in stable condition.    Lace+ Score: 64  59-90 High Risk  29-58 Medium Risk  0-28   Low Risk       TCM Follow-Up Recommendation:  LACE > 58: High Risk of readmission after discharge from the hospital.    Procedures during hospitalization:   None    Incidental or significant findings and recommendations (brief descriptions):  Please see brief synopsis above    Lab/Test results pending at Discharge:   None    Consultants:  Neurosurgery  Neurology  PM&R    Discharge Medication List:     Discharge Medications        START taking these medications        Instructions Prescription details   levETIRAcetam 500 MG Tabs  Commonly known as: Keppra      Take 1 tablet (500 mg total) by mouth 2 (two) times daily.   Quantity: 60 tablet  Refills: 3     memantine 5 MG Tabs  Commonly known as: Namenda      Take 1 tablet (5 mg total) by mouth daily.   Quantity: 30 tablet  Refills: 3            CHANGE how you take these medications        Instructions Prescription details   lisinopril 5 MG Tabs  Commonly known as: Prinivil; Zestril  What changed:   medication strength  how much to take      Take 1 tablet (5 mg total) by mouth daily.   Quantity: 30 tablet  Refills: 0            CONTINUE taking these medications        Instructions Prescription details   isosorbide mononitrate ER 30 MG Tb24  Commonly known as: Imdur      Take 1 tablet (30 mg total) by mouth daily.   Refills: 0     metoprolol tartrate 25 MG Tabs  Commonly known as: Lopressor      Take 1 tablet (25 mg total) by mouth daily.   Refills: 0     simvastatin 40 MG Tabs  Commonly known as: Zocor      Take 1 tablet (40 mg total) by mouth daily.   Refills: 0            STOP taking these medications      aspirin 81 MG Chew        carvedilol 3.125 MG Tabs  Commonly known as: Coreg        celecoxib 200 MG  Caps  Commonly known as: CeleBREX                  Where to Get Your Medications        These medications were sent to [id number] - [place], [place] - [address] OF [address] [place] [address], [phone], [phone]  [address], [facility] [address]      Phone: [phone]   levETIRAcetam 500 MG Tabs  lisinopril 5 MG Tabs  memantine 5 MG Tabs         [place]: No controlled substances prescribed at discharge.    Follow-up appointment:   Storm Mittal  [address] [facility] [address] [phone]    Schedule an appointment as soon as possible for a visit in 1 week(s)      Appointments for Next 30 Days 5/31/2024 - 6/30/2024      None            Vital signs:  Temp:  [96.3 °F (35.7 °C)-98.1 °F (36.7 °C)] 98.1 °F (36.7 °C)  Pulse:  [53-76] 56  Resp:  [18-20] 20  BP: (105-157)/(63-79) 129/69  SpO2:  [96 %-100 %] 99 %    Physical Exam:    See progress note dated same day.  -----------------------------------------------------------------------------------------------  PATIENT DISCHARGE INSTRUCTIONS: See electronic chart    Erwin Rivas DO    Time spent:  35 minutes

## 2024-05-31 NOTE — PLAN OF CARE
Assumed pt care this evening at 1930.  Pt is A&Ox2, following commands.   Pt on room air, SB.  NSR  Pt denies pain.  Per PT, patient up with Nadia Glover. LSO brace to be worn when out of bed for comfort  Pt on reg diet but does not eat much.  Food was encouraged with dinner but pt only took a few bites.  L upper arm SL.  Pt was confused overnight, repeatedly asking the same questions are required frequent reorientation. PRN melatonin given and calming environment created. Activity vest given  Bed in lowest position, call light in reach.

## 2024-05-31 NOTE — PROGRESS NOTES
[facility]   part of [facility]     Hospitalist Progress Note     Tierra Anderson Patient Status:  Observation    1927 MRN [id number]   Location [facility] 3SW-A Attending Kathy Leal, DO   Hosp Day # 3 PCP RAMON WU     Chief Complaint: Back pain    Subjective:     Patient denies complaints but is a bit more confused this morning.    Objective:    Review of Systems:   A comprehensive review of systems was completed; pertinent positive and negatives stated in subjective.    Vital signs:  Temp:  [96.3 °F (35.7 °C)-97.6 °F (36.4 °C)] 97.4 °F (36.3 °C)  Pulse:  [53-76] 76  Resp:  [18-20] 20  BP: (105-157)/(63-79) 143/65  SpO2:  [96 %-100 %] 97 %    Physical Exam:    General: No acute distress, awake and alert but confused  Respiratory: No wheezes, no rhonchi  Cardiovascular: S1, S2, regular rate and rhythm  Abdomen: Soft, Non-tender, non-distended, positive bowel sounds  Neuro: No new deficits  Extremities: No edema    Diagnostic Data:    Labs:  Recent Labs   Lab 24  0409 24  1031   WBC 11.5* 8.1   HGB 14.5 12.8   MCV 90.3 90.2   .0* 133.0*     Recent Labs   Lab 24  0409 24  0752 24  1031   *  --  147*   BUN 30*  --  25*   CREATSERUM 0.94  --  0.79   CA 9.1  --  8.7     --  138   K 3.8 4.2 3.9     --  111   CO2 25.0  --  23.0     Estimated Creatinine Clearance: 32.3 mL/min (based on SCr of 0.79 mg/dL).    No results for input(s): \"TROP\", \"TROPHS\", \"CK\" in the last 168 hours.    No results for input(s): \"PTP\", \"INR\" in the last 168 hours.     Microbiology  No results found for this visit on 24.    Imaging: Reviewed in Epic.    Medications:    lisinopril  5 mg Oral Daily    nystatin  5 mL Oral QID    isosorbide mononitrate ER  30 mg Oral Daily    calcium carbonate-vitamin D  2 tablet Oral TID CC    sennosides  17.2 mg Oral Nightly    atorvastatin  20 mg Oral Nightly    metoprolol succinate ER  25 mg Oral Daily Beta Blocker     acetaminophen  1,000 mg Oral TID    Or    HYDROcodone-acetaminophen  1 tablet Oral TID    Or    HYDROcodone-acetaminophen  2 tablet Oral TID       Assessment & Plan:      #Sub-acute R frontoparietal lobe infarct with  hemorrhagic conversion  #Left frontal CVA  #Right cerebella CVA  -CTH reviewed  -MRI/MRA brain with anesthesia reviewed. Concern for embolic etiology of CVA  -Echo with bubble study reviewed and no shunt  -Heparin ppx/aspirin discontinued. She would benefit from NOAC but high fall risk. Neuro discussed with daughter and plan to hold off. Repeat CT scan in 3 weeks as outpatient  -PT/OT/ST plan for acute rehab  -Repeat CT head today as she is a bit more confused. RN to notify neurology    #Mechanical fall  #L2 compression fracture  -Pain control, anti-emetics PRN  -PMR recommending acute inpatient rehab  -No acute surgical intervention per NSGY. LSO brace ordered when OOB     #HTN  -Metoprolol, imdur, lisinopril     #HLD  -Statin    #CAD  -Hold aspirin d/t hemorrhagic conversion CVA  -Statin    #Dementia    Plan: Repeat labs and CT head today. Await insurance auth for acute rehab.    Addendum: Reviewed CT head and BMP/CBC. Cleared for discharge.       Erwin Rivas DO    Supplementary Documentation:     Quality:  DVT Mechanical Prophylaxis:   SCDs,    DVT Pharmacologic Prophylaxis   Medication   None     Code Status: Full Code  [place]: External urinary catheter in place  GISSELL: Medically ready    Discharge is dependent on: Placement, consultant recs  At this point Ms. Anderson is expected to be discharge to: Acute rehab    The 21st Century Cures Act makes medical notes like these available to patients in the interest of transparency. Please be advised this is a medical document. Medical documents are intended to carry relevant information, facts as evident, and the clinical opinion of the practitioner. The medical note is intended as peer to peer communication and may appear blunt or direct. It is  written in medical language and may contain abbreviations or verbiage that are unfamiliar.

## 2024-05-31 NOTE — OCCUPATIONAL THERAPY NOTE
OCCUPATIONAL THERAPY TREATMENT NOTE - INPATIENT     Room Number: 3616/3616-A  Session: 2   Number of Visits to Meet Established Goals: 7    Presenting Problem: L2 compression fx; Acute R CVA  Prior Level of Function: Per daughter, pt is independent with ADL and mobility via walker. She sponge bathes. She drives and manages her own finances. Daughter visits daily but is often at work. Above home info per daughter.      PT Alison called daughter. Per PT:   \"pt lives in two [facility], stays on main floor of home. Lives alone, but dtr (Itzel) visits everyday. Supposed to ambulate with RW at all times. Up until now she was doing everything on her own- sponge bathing, dressing, etc.   Drives- just passed her drivers test and renewed her license.   Does laundry one in a while, otherwise dtr does it for her. Daughter brings over meals each day.   Writes her own checks for her bills.   Had recent admission at [facility] for \"mild heart attack\" (per dtr) and had a change in meds.\"     ASSESSMENT   Patient demonstrates limited progress this session, goals remain in progress.    Patient continues to function below baseline with toileting, lower body dressing, brace management, bed mobility, transfers, stating sitting balance, dynamic sitting balance, static standing balance, dynamic standing balance, maintaining seated position, and functional standing tolerance.   Contributing factors to remaining limitations include decreased functional strength, decreased functional reach, decreased endurance, impaired standing balance, impaired coordination, impaired motor planning, decreased muscular endurance, cognitive deficits ( ), decreased insight to deficits, and decreased safety awareness.  Next session anticipate patient to progress bed mobility, transfers, static standing balance, dynamic standing balance, and functional standing tolerance.  Occupational Therapy will continue to follow patient for duration of  hospitalization.    Patient continues to benefit from continued skilled OT services: to facilitate return to prior level of function as patient demonstrates high motivation with excellent tolerance to an intensive therapy program .          OT Device Recommendations: TBD    History: Patient is a 89 year old female admitted on 5/26/2024 with Presenting Problem: L2 fx, rececnt admit to [facility] for fall and heart attack.. 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   XR LUMBAR SPINE:   There is mild to moderate levoscoliosis of the lumbar spine.  There is no acute fracture dislocation.  There is diffuse osteopenia.  There is moderate degenerative disc disease throughout the lumbar spine most pronounced at L3-4.  There is mild to   moderate facet joint degenerative changes on the right greater than left at L3-4 L4-5 and L5-S1.  There is mild depression of the superior endplate of L2 which is an age indeterminate compression fracture.      5/27 MRI Brain     FINDINGS:       Examination is essentially nondiagnostic.  There is a hemorrhagic infarct within the right parietal lobe as noted on the head CT from earlier on 5/27/2024.  This measures 4.3 x 3.5 cm in the axial plane.      There are separate punctate acute infarcts within the parasagittal frontal lobes.      There is moderate prominence of the lateral ventricles.      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  A component this finding could be developmental.      Chronic sphenoid sinusitis.   WEIGHT BEARING RESTRICTION  Weight Bearing Restriction: None                Recommendations for nursing staff:   Transfers: selam leone  Toilepadmini location: bed level    TREATMENT SESSION:  Patient Start of Session: semi supine in bed  FUNCTIONAL TRANSFER ASSESSMENT  Sit to Stand: Edge of Bed; Chair  Edge of Bed: Maximum Assist  Chair: Maximum Assist    BED  MOBILITY  Rolling: Maximum Assist  Supine to Sit : Moderate Assist  Sit to Supine (OT): Not Tested  Scooting: Max A    BALANCE ASSESSMENT  Static Sitting: Minimal Assist  Sitting Unilateral: Minimal Assist  Static Standing: Maximum Assist  Standing Unilateral: Maximum Assist    FUNCTIONAL ADL ASSESSMENT  Eating: Not Tested      ACTIVITY TOLERANCE: fair                         O2 SATURATIONS       EDUCATION PROVIDED  Patient: Role of Occupational Therapy; Plan of Care; Discharge Recommendations; Functional Transfer Techniques; Fall Prevention; Posture/Positioning; Energy Conservation; Proper Body Mechanics  Patient's Response to Education: Verbalized Understanding; Requires Further Education  Family/Caregiver: Role of Occupational Therapy; Plan of Care  Family/Caregiver's Response to Education: Verbalized Understanding    Therapist comments: Pt received semi supine in bed. Upon this writer's arrival, pt very emotional. Pt re-oriented to location and reason why she's in the hospital. Pt declining that she had a fall. Pt requesting for her daughter. Daughter was called and was updated for therapy plan. Pt performs bed mobility at mod A to sit EOB. While seated EOB, pt was assisted in donning LSO brace. Pt then performs sit to stand transfer (pulling from selam stedy) requiring max A and transfers to bedside chair in preparation to trial transferring to bedside commode. Pt then engages in trials of sit to stand transfer from chair height with pt requiring max A x 2 at hand held. Attempted to trial functional mobility, however, pt is unable to sequence or sustain static standing safely. Pt left in chair with all needs.  Patient End of Session: Up in chair;Needs met;Call light within reach;RN aware of session/findings;All patient questions and concerns addressed;Alarm set    SUBJECTIVE  \"Baba (Itzel, pt's daughter).\"    PAIN ASSESSMENT  Rating: Unable to rate  Location: pain with transfers  Management Techniques: Body  mechanics;Activity promotion;Repositioning     OBJECTIVE  Precautions: Bed/chair alarm;Spine    AM-PAC ‘6-Clicks’ Inpatient Daily Activity Short Form  -   Putting on and taking off regular lower body clothing?: A Lot  -   Bathing (including washing, rinsing, drying)?: A Lot  -   Toileting, which includes using toilet, bedpan or urinal? : A Lot  -   Putting on and taking off regular upper body clothing?: A Lot  -   Taking care of personal grooming such as brushing teeth?: A Little  -   Eating meals?: A Little    AM-PAC Score:  Score: 14  Approx Degree of Impairment: 59.67%  Standardized Score (AM-PAC Scale): 33.39    PLAN  OT Treatment Plan: Balance activities;Energy conservation/work simplification techniques;ADL training;Functional transfer training;Visual perceptual training;UE strengthening/ROM;Endurance training;Cognitive reorientation;Patient/Family education;Equipment eval/education;Patient/Family training;Neuromuscluar reeducation;Fine motor coordination activities;Compensatory technique education  Rehab Potential : Good  Frequency: 3-5x/week    OT Goals:     ADL Goals  Patient will perform toileting with mod (A) and AE PRN  Patient will perform LB dressing with mod (A) and AE PRN     Functional Transfer Goals  Patient will perform bed mobility supine to sit with mod (A)  Patient will perform bed mobility sit to supine with mod (A)  Patient will perform commode transfer with mod (A)     Additional Goals:  Patient will maintain spine precautions during ADL with cueing prn  Pt will sit EOB CGA 5 minutes    OT Session Time: 25 minutes  Therapeutic Activity: 25 minutes

## 2024-05-31 NOTE — PLAN OF CARE
Patient given order to discharge to acute rehab. This RN called to give handoff report to RN named, Eriberto at [facility]. RN Eriberto, verbalized understanding of POC for pt. All questions were answered.    This RN discussed with patient and daughter named Fiona at the plan for care- both whom verbalized understanding of all instruction. Patient's IV and cardiac monitor were removed from the patient. Patient was transported in stable condition via ambulance transfer with [facility] .

## 2024-05-31 NOTE — CM/SW NOTE
05/31/24 1432   Discharge disposition   Expected discharge disposition Rehab Facili   Post Acute Care Provider [facility]   Discharge transportation [facility]     Notified by RN pt is medically cleared to discharge. SW received message from GRACE Thomas stated pt will go into room 1165. [facility] arranged for 4pm. Updated RN, GRACE Thomas, and pt's HCPOA Negro who is in agreement with discharge plan. Negro stated he will update pt's dtr. PCS form completed and available for RN to print.      [facility]   [phone]    [facility]  [phone]    BRIDGETTE Broderick  Discharge Planner

## 2024-06-03 NOTE — PAYOR COMM NOTE
--------------  DISCHARGE REVIEW    Payor: [employer] [facility]  Subscriber #:  [id number]  Authorization Number: [id number]    Admit date: 24  Admit time:   9:52 AM  Discharge Date: 2024  6:21 PM     Admitting Physician: Kathy Leal DO  Attending Physician:  No att. providers found  Primary Care Physician: Storm Mittal          Discharge Summary Notes        Discharge Summary signed by Erwin Rivas DO at 2024  2:51 PM       Author: Erwin Rivas DO Specialty: HOSPITALIST, Internal Medicine Author Type: Physician    Filed: 2024  2:51 PM Date of Service: 2024  2:43 PM Status: Addendum    : Erwin Rivas DO (Physician)    Related Notes: Original Note by Erwin Rivas DO (Physician) filed at 2024  2:46 PM           [facility]     Tierra Anderson Patient Status:  Inpatient    1927 MRN [id number]   Location [facility] 3NE-A Attending Erwin Rivas DO   Hosp Day # 3 PCP STORM MITTAL     Date of Admission: 2024  Date of Discharge: 2024  Discharge Disposition: Acute rehab    Discharge Diagnosis:   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 Pt is a poor historian and limited d/t pain at time of encounter. History obtained from ED signout, chart review, and daughter at bedside. Daughter reports pt was just released from OSH ([facility]) a few days ago after a fall and was told she \"had a mild heart attack\". She was treated supportively and her cardiac medication regimen was adjusted. She was doing well yesterday at home after discharge. This morning, she felt weak and slid off the couch. She did not hit her head or lose  consciousness. Denies any chest pain/pressure, SOB/TOUSSAINT, palpitations, abdominal pain, n/v/d, fevers/chills, urinary symptoms. She was able to pull herself back up onto the cough, but had severe L sided back pain. She asked her daughter to call [phone] d/t severity of pain.     Brief Synopsis: Patient was found to have L2 compression fracture.  Neurosurgery did not recommend any acute surgical intervention and LSO brace was recommended.  Pain improved in the hospital.  She was found to have confusion and imaging showed subacute right parietal lobe infarct with hemorrhagic conversion as well as left frontal CVA and right cerebellar CVA.  Echo showed no shunt.  She would benefit from NOAC but is a high fall risk and neuro discussed this with her daughter and plan is to hold off on anticoagulation.  Plans for repeat CT scan in 3 weeks as an outpatient.  Until then, antiplatelets and anticoagulation will be held.  She was discharged to acute rehab in stable condition.    Lace+ Score: 64  59-90 High Risk  29-58 Medium Risk  0-28   Low Risk       TCM Follow-Up Recommendation:  LACE > 58: High Risk of readmission after discharge from the hospital.    Procedures during hospitalization:   None    Incidental or significant findings and recommendations (brief descriptions):  Please see brief synopsis above    Lab/Test results pending at Discharge:   None    Consultants:  Neurosurgery  Neurology  PM&R    Discharge Medication List:     Discharge Medications        START taking these medications        Instructions Prescription details   levETIRAcetam 500 MG Tabs  Commonly known as: Keppra      Take 1 tablet (500 mg total) by mouth 2 (two) times daily.   Quantity: 60 tablet  Refills: 3     memantine 5 MG Tabs  Commonly known as: Namenda      Take 1 tablet (5 mg total) by mouth daily.   Quantity: 30 tablet  Refills: 3            CHANGE how you take these medications        Instructions Prescription details   lisinopril 5 MG  Tabs  Commonly known as: Prinivil; Zestril  What changed:   medication strength  how much to take      Take 1 tablet (5 mg total) by mouth daily.   Quantity: 30 tablet  Refills: 0            CONTINUE taking these medications        Instructions Prescription details   isosorbide mononitrate ER 30 MG Tb24  Commonly known as: Imdur      Take 1 tablet (30 mg total) by mouth daily.   Refills: 0     metoprolol tartrate 25 MG Tabs  Commonly known as: Lopressor      Take 1 tablet (25 mg total) by mouth daily.   Refills: 0     simvastatin 40 MG Tabs  Commonly known as: Zocor      Take 1 tablet (40 mg total) by mouth daily.   Refills: 0            STOP taking these medications      aspirin 81 MG Chew        carvedilol 3.125 MG Tabs  Commonly known as: Coreg        celecoxib 200 MG Caps  Commonly known as: CeleBREX                  Where to Get Your Medications        These medications were sent to [facility]/pharmacy #[id number] - [facility] [address] [facility] [address] [facility] [address], [phone], [phone]  [address] [place] [address] [address] , [facility] [address]      Phone: [phone]   levETIRAcetam 500 MG Tabs  lisinopril 5 MG Tabs  memantine 5 MG Tabs         [place] No [place] substances prescribed at discharge.    Follow-up appointment:   Storm Mittal  [address] [facility] [address] [phone]    Schedule an appointment as soon as possible for a visit in 1 week(s)      Appointments for Next 30 Days 5/31/2024 - 6/30/2024      None            Vital signs:  Temp:  [96.3 °F (35.7 °C)-98.1 °F (36.7 °C)] 98.1 °F (36.7 °C)  Pulse:  [53-76] 56  Resp:  [18-20] 20  BP: (105-157)/(63-79) 129/69  SpO2:  [96 %-100 %] 99 %    Physical Exam:    See progress note dated same day.  -----------------------------------------------------------------------------------------------  PATIENT DISCHARGE INSTRUCTIONS: See electronic chart    Erwin Rivas DO    Time spent:  35 minutes    Electronically signed by Erwin Rivas DO on  5/31/2024  2:51 PM         REVIEWER COMMENTS

## 2024-06-07 ENCOUNTER — PATIENT OUTREACH (OUTPATIENT)
Dept: CASE MANAGEMENT | Age: 89
End: 2024-06-07

## 2024-06-07 NOTE — PROGRESS NOTES
Neuro/Stroke-Ischemic 05/26/24 apt request (discharged 05/31) - pt discharged to [facility]  [facility] [place] [address]  [phone]  Follow up 1 month  Apt made:  [place] 07/09 @1:00pm  Confirmed w/pt Negro DELGADO  Closing encounter

## 2024-06-21 ENCOUNTER — HOSPITAL ENCOUNTER (OUTPATIENT)
Dept: CT IMAGING | Facility: HOSPITAL | Age: 89
Discharge: HOME | End: 2024-06-21
Attending: INTERNAL MEDICINE

## 2024-06-21 DIAGNOSIS — I63.9 CEREBROVASCULAR ACCIDENT (CVA), UNSPECIFIED MECHANISM (HCC): ICD-10-CM

## 2024-06-21 PROCEDURE — 70450 CT HEAD/BRAIN W/O DYE: CPT | Performed by: INTERNAL MEDICINE

## 2024-09-05 ENCOUNTER — APPOINTMENT (OUTPATIENT)
Dept: CT IMAGING | Facility: HOSPITAL | Age: 89
End: 2024-09-05
Payer: MEDICARE

## 2024-09-05 ENCOUNTER — HOSPITAL ENCOUNTER (EMERGENCY)
Facility: HOSPITAL | Age: 89
Discharge: HOME | End: 2024-09-05
Attending: EMERGENCY MEDICINE
Payer: MEDICARE

## 2024-09-05 VITALS
HEART RATE: 73 BPM | DIASTOLIC BLOOD PRESSURE: 64 MMHG | SYSTOLIC BLOOD PRESSURE: 134 MMHG | TEMPERATURE: 98 F | OXYGEN SATURATION: 100 % | RESPIRATION RATE: 16 BRPM
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
## 2024-09-05 LAB
ALBUMIN SERPL-MCNC: 4 G/DL (ref 3.2–4.8)
ALBUMIN/GLOB SERPL: 1.7 {RATIO} (ref 1–2)
ALP LIVER SERPL-CCNC: 109 U/L
ALT SERPL-CCNC: 18 U/L
ANION GAP SERPL CALC-SCNC: 5 MMOL/L (ref 0–18)
AST SERPL-CCNC: 35 U/L (ref ?–34)
BASOPHILS # BLD AUTO: 0.07 X10(3) UL (ref 0–0.2)
BASOPHILS NFR BLD AUTO: 0.7 %
BILIRUB SERPL-MCNC: 0.4 MG/DL (ref 0.2–0.9)
BUN BLD-MCNC: 29 MG/DL (ref 9–23)
CALCIUM BLD-MCNC: 10 MG/DL (ref 8.7–10.4)
CHLORIDE SERPL-SCNC: 107 MMOL/L (ref 98–112)
CO2 SERPL-SCNC: 27 MMOL/L (ref 21–32)
CREAT BLD-MCNC: 0.95 MG/DL
EGFRCR SERPLBLD CKD-EPI 2021: 54 ML/MIN/1.73M2 (ref 60–?)
EOSINOPHIL # BLD AUTO: 0.26 X10(3) UL (ref 0–0.7)
EOSINOPHIL NFR BLD AUTO: 2.4 %
ERYTHROCYTE [DISTWIDTH] IN BLOOD BY AUTOMATED COUNT: 14.3 %
GLOBULIN PLAS-MCNC: 2.3 G/DL (ref 2–3.5)
GLUCOSE BLD-MCNC: 113 MG/DL (ref 70–99)
HCT VFR BLD AUTO: 40.3 %
HGB BLD-MCNC: 13.6 G/DL
IMM GRANULOCYTES # BLD AUTO: 0.03 X10(3) UL (ref 0–1)
IMM GRANULOCYTES NFR BLD: 0.3 %
LYMPHOCYTES # BLD AUTO: 1.56 X10(3) UL (ref 1–4)
LYMPHOCYTES NFR BLD AUTO: 14.5 %
MCH RBC QN AUTO: 32.4 PG (ref 26–34)
MCHC RBC AUTO-ENTMCNC: 33.7 G/DL (ref 31–37)
MCV RBC AUTO: 96 FL
MONOCYTES # BLD AUTO: 1.03 X10(3) UL (ref 0.1–1)
MONOCYTES NFR BLD AUTO: 9.6 %
NEUTROPHILS # BLD AUTO: 7.81 X10 (3) UL (ref 1.5–7.7)
NEUTROPHILS # BLD AUTO: 7.81 X10(3) UL (ref 1.5–7.7)
NEUTROPHILS NFR BLD AUTO: 72.5 %
OSMOLALITY SERPL CALC.SUM OF ELEC: 295 MOSM/KG (ref 275–295)
PLATELET # BLD AUTO: 273 10(3)UL (ref 150–450)
POTASSIUM SERPL-SCNC: 3.5 MMOL/L (ref 3.5–5.1)
PROT SERPL-MCNC: 6.3 G/DL (ref 5.7–8.2)
RBC # BLD AUTO: 4.2 X10(6)UL
SODIUM SERPL-SCNC: 139 MMOL/L (ref 136–145)
WBC # BLD AUTO: 10.8 X10(3) UL (ref 4–11)

## 2024-09-05 PROCEDURE — 70450 CT HEAD/BRAIN W/O DYE: CPT

## 2024-09-05 PROCEDURE — 80053 COMPREHEN METABOLIC PANEL: CPT | Performed by: EMERGENCY MEDICINE

## 2024-09-05 PROCEDURE — 85025 COMPLETE CBC W/AUTO DIFF WBC: CPT | Performed by: EMERGENCY MEDICINE

## 2024-09-05 PROCEDURE — 93005 ELECTROCARDIOGRAM TRACING: CPT

## 2024-09-05 PROCEDURE — 99285 EMERGENCY DEPT VISIT HI MDM: CPT

## 2024-09-05 PROCEDURE — 99284 EMERGENCY DEPT VISIT MOD MDM: CPT

## 2024-09-05 PROCEDURE — 36415 COLL VENOUS BLD VENIPUNCTURE: CPT

## 2024-09-05 PROCEDURE — 93010 ELECTROCARDIOGRAM REPORT: CPT

## 2024-09-06 LAB
ATRIAL RATE: 65 BPM
P AXIS: 83 DEGREES
P-R INTERVAL: 226 MS
Q-T INTERVAL: 458 MS
QRS DURATION: 144 MS
QTC CALCULATION (BEZET): 476 MS
R AXIS: -57 DEGREES
T AXIS: 105 DEGREES
VENTRICULAR RATE: 65 BPM

## 2024-09-06 NOTE — ED PROVIDER NOTES
Patient Seen in: [facility] Emergency Department      History     Chief Complaint   Patient presents with    Fall     Stated Complaint:     Subjective:   HPI    Patient is a 89-year-old female presents to ED for evaluation of a fall.  Patient is demented and unable to provide any reliable history with regards to her fall or why she is here.  Per ER nurse who spoke with EMS they stated her bed mattress is at ground-level 5 inches off the floor and nursing staff at nursing facility thinks she rolled off her bed.  Patient denies any complaints of headache, chest pain, abdominal pain.  She is requesting to leave.  Denies fever, cough or vomiting    Objective:   No pertinent past medical history.            No pertinent past surgical history.              No pertinent social history.            Review of Systems    Positive for stated Chief Complaint: Fall    Other systems are as noted in HPI.  Constitutional and vital signs reviewed.      All other systems reviewed and negative except as noted above.    Physical Exam     ED Triage Vitals   BP 09/05/24 2145 134/64   Pulse 09/05/24 2127 81   Resp 09/05/24 2127 20   Temp 09/05/24 2127 98 °F (36.7 °C)   Temp src 09/05/24 2127 Temporal   SpO2 09/05/24 2127 100 %   O2 Device 09/05/24 2127 None (Room air)       Current Vitals:   Vital Signs  BP: 134/64  Pulse: 73  Resp: 16  Temp: 98 °F (36.7 °C)  Temp src: Temporal  MAP (mmHg): 83    Oxygen Therapy  SpO2: 100 %  O2 Device: None (Room air)            Physical Exam    GENERAL: No apparent distress, nontoxic, well-appearing.  HEENT: Normocephalic, atraumatic.  Moist mucous membranes.  Pupils equal round reactive to light accommodation, extraocular motion is intact, sclerae white, conjunctiva is pink.  Oropharynx is unremarkable, no exudate, dentition intact, no facial bone tenderness or septal hematomas, TMs clear bilaterally  NECK: Supple, trachea midline, no lymphadenopathy, full ROM cervical spine without any pain, no  pain on axial loading.      No midline cervical spine tenderness  LUNG: Lungs clear to auscultation bilaterally, no wheezing, no rales, no rhonchi.  CARDIOVASCULAR: Regular rate and rhythm, normal S1-S2, no S3-S4 or murmur  CHEST: No tenderness, no crepitus, no ecchymosis, no abrasions  BACK: No midline lumbar or thoracic tenderness, no evidence for ecchymosis or abrasions, no CVA tenderness  PELVIS: Pelvis is stable to compression.  ABDOMEN: Bowel sounds are present, soft, nondistended, no pulsatile masses, nontender  MUSCULOSKELETAL: No calf tenderness, no clubbing, no cyanosis, or edema.  Dorsalis pedis and posterior tibial pulses 2+.  No long bone tenderness or deformities noted  SKIN EXAMINATION: Warm and dry.  No rashes   NEUROLOGICAL:  alert and oriented X 3, motor 5/5 all groups, normal sensation, speech is intact.    ED Course     Labs Reviewed   CBC WITH DIFFERENTIAL WITH PLATELET - Abnormal; Notable for the following components:       Result Value    Neutrophil Absolute Prelim 7.81 (*)     Neutrophil Absolute 7.81 (*)     Monocyte Absolute 1.03 (*)     All other components within normal limits   COMP METABOLIC PANEL (14) - Abnormal; Notable for the following components:    Glucose 113 (*)     BUN 29 (*)     eGFR-Cr 54 (*)     AST 35 (*)     All other components within normal limits   BUN 29.  EKG    Rate, intervals and axes as noted on EKG Report.  Rate: 65  Rhythm: Sinus Rhythm  Reading: No acute changes                 I personally reviewd CT images of head and independent interpretation shows no acute bleed.  I also viewed formal radiology report as read by radiology with findings below:    CT BRAIN OR HEAD (CPT=70450)    Result Date: 9/5/2024  PROCEDURE:  CT BRAIN OR HEAD (15150)  COMPARISON:  [facility] , CT, CT BRAIN OR HEAD ([id number]), 5/27/2024, 11:53 AM.  [facility] , MR, MRI BRAIN MRA BRAIN MRA NECK (CPT=70551/26218/30108), 5/28/2024, 4:18 PM.  [facility] , MR, MRI BRAIN MRA BRAIN MRA NECK  (CPT=70551/81263/97899), 5/27/2024, 9:10 PM.  [facility] , CT, CT BRAIN OR HEAD ([id number]), 5/31/2024, 10:53 AM.  [facility] , CT, CT BRAIN OR HEAD ([id number]), 6/21/2024, 3:18 PM.  INDICATIONS:  Status post fall, head injury.  Recent history of ischemic infarct involving the right parietal lobe.  TECHNIQUE:  Noncontrast CT scanning is performed through the brain. Dose reduction techniques were used. Dose information is transmitted to the ACR (American College of Radiology) NRDR (National Radiology Data Registry) which includes the Dose Index Registry.  PATIENT STATED HISTORY: (As transcribed by Technologist)  fall    FINDINGS:  No acute intracranial hemorrhage noted.  No mass effect or midline shift.  There has been interval progression in the previously seen ischemic infarct of the right parietal lobe with a increasing focus of encephalomalacia and volume loss, currently measuring 3.9 x 4.9 cm.  Stable mild diffuse atrophy and white matter disease noted.  Stable chronic sinusitis suggested with extensive, complete opacification of the right sphenoid sinus which appears expanded and stable from prior imaging the possibly of an underlying mucocele cannot be entirely excluded.  No osseous dehiscence is noted.  No acute sinus disease.  The mastoid air cells are well pneumatized.  The bony calvarium is intact.            CONCLUSION:  1. No acute intracranial process. 2. Further evolution of ischemic infarct involving the right parietal lobe with an increasing size of volume loss and encephalomalacia in this location as described above. 3. Stable diffuse atrophy and white matter disease. 4. Stable chronic sinus disease involving the right sphenoid which is completely opacified with osseous expansion.  No osseous dehiscence identified.  The possibility of an underlying mucocele cannot be entirely excluded.    LOCATION:  [facility]   Dictated by (CST): Richard Oakes DO on 9/05/2024 at 10:18 PM     Finalized by (JOSE): Richard Oakes DO on  9/05/2024 at 10:23 PM             [facility]      Patient is a 89-year-old female presents to ED for evaluation of a fall.  Differential mechanical fall, electrolyte abnormality, intracranial bleed.  No traumatic findings noted on exam.  CT scan shows old infarct.  No acute bleed.  Laboratory test unremarkable except for elevated BUN of 29.  Patient be discharged back to nursing facility given negative workup.    Patient was screened and evaluated during this visit.   As a treating physician attending to the patient, I determined, within reasonable clinical confidence and prior to discharge, that an emergency medical condition was not or was no longer present.  There was no indication for further evaluation, treatment or admission on an emergency basis.  Comprehensive verbal and written discharge and follow-up instructions were provided to help prevent relapse or worsening.  Patient was instructed to follow-up with her primary care provider for further evaluation and treatment, but to return immediately to the ER for worsening, concerning, new, changing or persisting symptoms.  I discussed the case with the patient and they had no questions, complaints, or concerns.  Patient felt comfortable going home.                                       [facility]    Disposition and Plan     Clinical Impression:  1. Fall, initial encounter         Disposition:  Discharge  9/5/2024 10:55 pm    Follow-up:  Storm Mittal  [address]  [facility] [address]  [phone]    Follow up  As needed          Medications Prescribed:  Discharge Medication List as of 9/5/2024 10:57 PM

## 2024-10-06 ENCOUNTER — APPOINTMENT (OUTPATIENT)
Dept: GENERAL RADIOLOGY | Facility: HOSPITAL | Age: 89
End: 2024-10-06
Attending: EMERGENCY MEDICINE
Payer: MEDICARE

## 2024-10-06 ENCOUNTER — HOSPITAL ENCOUNTER (EMERGENCY)
Facility: HOSPITAL | Age: 89
Discharge: HOME | End: 2024-10-06
Attending: EMERGENCY MEDICINE
Payer: MEDICARE

## 2024-10-06 VITALS
TEMPERATURE: 98 F | WEIGHT: 108 LBS | SYSTOLIC BLOOD PRESSURE: 118 MMHG | DIASTOLIC BLOOD PRESSURE: 71 MMHG | HEART RATE: 51 BPM | HEIGHT: 61 IN | OXYGEN SATURATION: 100 % | RESPIRATION RATE: 14 BRPM | BODY MASS INDEX: 20.39 KG/M2
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
## 2024-10-06 DIAGNOSIS — M87.9 OSTEONECROSIS OF RIGHT HIP (HCC): ICD-10-CM

## 2024-10-06 LAB
ALBUMIN SERPL-MCNC: 3.6 G/DL (ref 3.2–4.8)
ALBUMIN/GLOB SERPL: 1.4 {RATIO} (ref 1–2)
ALP LIVER SERPL-CCNC: 102 U/L
ALT SERPL-CCNC: 17 U/L
ANION GAP SERPL CALC-SCNC: 5 MMOL/L (ref 0–18)
ANTIBODY SCREEN: NEGATIVE
AST SERPL-CCNC: 31 U/L (ref ?–34)
BASOPHILS # BLD AUTO: 0.06 X10(3) UL (ref 0–0.2)
BASOPHILS NFR BLD AUTO: 0.9 %
BILIRUB SERPL-MCNC: 0.5 MG/DL (ref 0.2–0.9)
BUN BLD-MCNC: 26 MG/DL (ref 9–23)
CALCIUM BLD-MCNC: 10.1 MG/DL (ref 8.7–10.4)
CHLORIDE SERPL-SCNC: 107 MMOL/L (ref 98–112)
CO2 SERPL-SCNC: 30 MMOL/L (ref 21–32)
CREAT BLD-MCNC: 0.92 MG/DL
EGFRCR SERPLBLD CKD-EPI 2021: 57 ML/MIN/1.73M2 (ref 60–?)
EOSINOPHIL # BLD AUTO: 0.35 X10(3) UL (ref 0–0.7)
EOSINOPHIL NFR BLD AUTO: 5.4 %
ERYTHROCYTE [DISTWIDTH] IN BLOOD BY AUTOMATED COUNT: 14.2 %
GLOBULIN PLAS-MCNC: 2.5 G/DL (ref 2–3.5)
GLUCOSE BLD-MCNC: 96 MG/DL (ref 70–99)
HCT VFR BLD AUTO: 40.3 %
HGB BLD-MCNC: 13.2 G/DL
IMM GRANULOCYTES # BLD AUTO: 0.01 X10(3) UL (ref 0–1)
IMM GRANULOCYTES NFR BLD: 0.2 %
LYMPHOCYTES # BLD AUTO: 1.83 X10(3) UL (ref 1–4)
LYMPHOCYTES NFR BLD AUTO: 28.5 %
MCH RBC QN AUTO: 31.8 PG (ref 26–34)
MCHC RBC AUTO-ENTMCNC: 32.8 G/DL (ref 31–37)
MCV RBC AUTO: 97.1 FL
MONOCYTES # BLD AUTO: 0.77 X10(3) UL (ref 0.1–1)
MONOCYTES NFR BLD AUTO: 12 %
MRSA DNA SPEC QL NAA+PROBE: POSITIVE
NEUTROPHILS # BLD AUTO: 3.41 X10 (3) UL (ref 1.5–7.7)
NEUTROPHILS # BLD AUTO: 3.41 X10(3) UL (ref 1.5–7.7)
NEUTROPHILS NFR BLD AUTO: 53 %
OSMOLALITY SERPL CALC.SUM OF ELEC: 299 MOSM/KG (ref 275–295)
PLATELET # BLD AUTO: 259 10(3)UL (ref 150–450)
POTASSIUM SERPL-SCNC: 3.5 MMOL/L (ref 3.5–5.1)
PROT SERPL-MCNC: 6.1 G/DL (ref 5.7–8.2)
RBC # BLD AUTO: 4.15 X10(6)UL
RH BLOOD TYPE: POSITIVE
RH BLOOD TYPE: POSITIVE
SODIUM SERPL-SCNC: 142 MMOL/L (ref 136–145)
WBC # BLD AUTO: 6.4 X10(3) UL (ref 4–11)

## 2024-10-06 PROCEDURE — 86900 BLOOD TYPING SEROLOGIC ABO: CPT | Performed by: EMERGENCY MEDICINE

## 2024-10-06 PROCEDURE — 93010 ELECTROCARDIOGRAM REPORT: CPT

## 2024-10-06 PROCEDURE — 99285 EMERGENCY DEPT VISIT HI MDM: CPT

## 2024-10-06 PROCEDURE — 99284 EMERGENCY DEPT VISIT MOD MDM: CPT

## 2024-10-06 PROCEDURE — 80053 COMPREHEN METABOLIC PANEL: CPT | Performed by: EMERGENCY MEDICINE

## 2024-10-06 PROCEDURE — 93005 ELECTROCARDIOGRAM TRACING: CPT

## 2024-10-06 PROCEDURE — 86901 BLOOD TYPING SEROLOGIC RH(D): CPT | Performed by: EMERGENCY MEDICINE

## 2024-10-06 PROCEDURE — 85025 COMPLETE CBC W/AUTO DIFF WBC: CPT | Performed by: EMERGENCY MEDICINE

## 2024-10-06 PROCEDURE — 87641 MR-STAPH DNA AMP PROBE: CPT

## 2024-10-06 PROCEDURE — 36415 COLL VENOUS BLD VENIPUNCTURE: CPT

## 2024-10-06 PROCEDURE — 86850 RBC ANTIBODY SCREEN: CPT | Performed by: EMERGENCY MEDICINE

## 2024-10-06 PROCEDURE — 73502 X-RAY EXAM HIP UNI 2-3 VIEWS: CPT | Performed by: EMERGENCY MEDICINE

## 2024-10-06 PROCEDURE — 87641 MR-STAPH DNA AMP PROBE: CPT | Performed by: EMERGENCY MEDICINE

## 2024-10-06 RX ORDER — ATORVASTATIN CALCIUM 20 MG/1
20 TABLET, FILM COATED ORAL NIGHTLY
COMMUNITY

## 2024-10-06 RX ORDER — ASCORBIC ACID 500 MG
500 TABLET ORAL DAILY
COMMUNITY

## 2024-10-06 RX ORDER — SERTRALINE HYDROCHLORIDE 25 MG/1
25 TABLET, FILM COATED ORAL DAILY
COMMUNITY

## 2024-10-06 RX ORDER — MELATONIN
3 NIGHTLY
COMMUNITY

## 2024-10-06 RX ORDER — TETRAHYDROZOLINE HCL 0.05 %
1 DROPS OPHTHALMIC (EYE) 3 TIMES DAILY
COMMUNITY

## 2024-10-06 RX ORDER — OLANZAPINE 2.5 MG/1
2.5 TABLET, FILM COATED ORAL 2 TIMES DAILY
COMMUNITY

## 2024-10-06 RX ORDER — CARBOXYMETHYLCELLULOSE SODIUM 10 MG/ML
1 GEL OPHTHALMIC AS NEEDED
COMMUNITY

## 2024-10-06 RX ORDER — SENNOSIDES 8.6 MG
17.2 TABLET ORAL 2 TIMES DAILY
COMMUNITY

## 2024-10-06 NOTE — ED PROVIDER NOTES
Patient Seen in: [facility] Emergency Department      History     Chief Complaint   Patient presents with    Leg or Foot Injury     Stated Complaint:     Subjective:   HPI  ED History source : EMS  89-year-old female who presents to the emergency department via EMS due to report of fractured right hip.  It was reported that the patient had a fall earlier in the week and had x-rays performed 10/4/2024.  X-rays were reviewed and was found that the patient had a broken right hip.  The patient presents with obvious shortening and rotation of the right lower extremity.  The patient has severe dementia and is unable to give history and history is obtained from EMS.  The patient does not complain of any discomfort at this time.  Reviewing her records she was seen here on 9/5/2024 for fall out of bed.  She denies having any discomfort at this time.    Objective:     Past Medical History:    Coronary atherosclerosis    Essential hypertension    High blood pressure    High cholesterol              History reviewed. No pertinent surgical history.             Social History     Socioeconomic History    Marital status:    Tobacco Use    Smoking status: Never    Smokeless tobacco: Never   Vaping Use    Vaping status: Never Used   Substance and Sexual Activity    Alcohol use: Not Currently    Drug use: Never     Social Determinants of Health     Food Insecurity: Low Risk  (6/20/2024)    Received from [facility]    Food Insecurity     Have there been times that your food ran out, and you didn't have money to get more?: No     Are there times that you worry that this might happen?: No   Transportation Needs: Low Risk  (6/21/2024)    Received from [facility]    Transportation Needs     Has lack of transportation kept you from medical appointments, meetings, work, or from getting things needed for daily living: No   Physical Activity: Inactive (2/25/2021)    [facility] from  [facility]    Exercise Vital Sign     Days of Exercise per Week: 0 days     Minutes of Exercise per Session: 0 min   Housing Stability: Low Risk  (6/20/2024)    Received from [facility]    Housing Stability     Are you worried that your electric, gas, oil, or water might be shut off?: No     Are you concerned about having a safe and reliable place to live?: No                  Physical Exam     ED Triage Vitals   BP 10/06/24 0818 99/55   Pulse 10/06/24 0818 55   Resp 10/06/24 0818 16   Temp 10/06/24 0816 98.4 °F (36.9 °C)   Temp src 10/06/24 0816 Temporal   SpO2 10/06/24 0818 100 %   O2 Device 10/06/24 0818 None (Room air)       Current Vitals:   Vital Signs  BP: 118/71  Pulse: 51  Resp: 14  Temp: 98.4 °F (36.9 °C)  Temp src: Temporal  MAP (mmHg): 85    Oxygen Therapy  SpO2: 100 %  O2 Device: None (Room air)        Physical Exam  General: Awake alert 89-year-old.  No complaints this time.  HEENT: Head appears atraumatic normocephalic.  Pupils reactive light.  Extraocular motions are intact.  Lungs: Clear bilateral without wheezes or rhonchi  Cardiac: Heart rate of 60 normal sinus without murmurs or ectopy  Abdomen: No tenderness on palpation.  Extremities: Right leg is shortened and rotated.  Pulses are +2 dorsalis pedis pulses.  No tenderness on palpation.    ED Course     Labs Reviewed   COMP METABOLIC PANEL (14) - Abnormal; Notable for the following components:       Result Value    BUN 26 (*)     Calculated Osmolality 299 (*)     eGFR-Cr 57 (*)     All other components within normal limits   ED/MRSA SCREEN BY PCR-CC - Abnormal; Notable for the following components:    MRSA Screen By PCR Positive (*)     All other components within normal limits    Narrative:     A positive result does not necessarily indicate the presence of viable organisms. For confirmation, epidemiological typing and susceptibility testing, appropriate culture is needed.    PLEASE  REFER TO MRSA SCREENING PROTOCOL FOR TREATMENT.     CBC WITH DIFFERENTIAL WITH PLATELET   TYPE AND SCREEN    Narrative:     The following orders were created for panel order Type and screen.  Procedure                               Abnormality         Status                     ---------                               -----------         ------                     ABORH (Blood Type)[[id number]]                               Final result               Antibody Screen[[id number]]                                  Final result                 Please view results for these tests on the individual orders.   ABORH (BLOOD TYPE)   ANTIBODY SCREEN   ABORH CONFIRMATION   RAINBOW DRAW LAVENDER   RAINBOW DRAW LIGHT GREEN   RAINBOW DRAW BLUE   RAINBOW DRAW GOLD     EKG    Rate, intervals and axes as noted on EKG Report.  Rate: 50  Rhythm: Sinus Rhythm  Reading: Sinus bradycardia with first-degree AV block left axis deviation nonspecific intraventricular block LVH voltage criteria appears similar to EKG September 2024         Narrative  PROCEDURE:  XR HIP W OR WO PELVIS 2 OR 3 VIEWS, RIGHT ( CPT=73502)     TECHNIQUE:  Unilateral 2 to 3 views of the hip and pelvis if performed.     COMPARISON:  None.     INDICATIONS:  Fall was shortened and rotated extremity     PATIENT STATED HISTORY: (As transcribed by Technologist)  Patient offered no additional history at this time.                      Impression  CONCLUSION:    No definite acute fracture, no sign of dislocation.  Severe osteoarthritic disease involving the right hip, with severe joint narrowing, sclerosis, osteophyte, and small appearance of the right femoral head, chronically eroded.  Short  femoral neck.  Partially seen left hip arthroplasty.  Very large amount of stool in the rectum.  Partially seen large amount of stool within other portions the colon.     LOCATION:  [facility]        Dictated by (CST): Phil Singh MD on 10/06/2024 at 9:13 AM      Finalized by (CST):  Phil Singh MD on 10/06/2024 at 9:13 AM                MDM    Differential diagnosis includes but is not limited to hip fracture, electrolyte abnormality, hip dislocation, mechanical fall.  Laboratories were sent.  X-rays were performed to assess the fracture.  I reviewed the x-rays and agree with the radiologist report that showed no definitive acute fracture, no sign of dislocation.  Severe osteoarthritic disease involving the right hip with superior joint narrowing, sclerosis, osteophyte and small appearance of the right femoral head, chronically eroded.  Short femoral neck.  Partially seen left hip arthroplasty.  Very large amount of stool in the rectum.  Partially seen large amount of stool within other portions of the colon.  On continued review of her records She had x-rays performed on 5/26/2024 that showed the severe degenerative changes of the right hip with loss of the joint space and remodeling the femoral head.  These findings appear to be old and chronic and you are even seen back in 2020 on review of her films.  She does not appear to have an acute injury and even the patient's daughter corroborates that the patient had severe degeneration and necrosis of the right hip.  She even told me that there is no chance of them repairing or replacing the hip with her 89-year-old patient.  She does not ambulate but she does try to climb out of bed and has had multiple falls.  They are working on a new place of living for the patient.  The patient will need to go back by ambulance and she is nonambulatory her MRSA was positive on her MRSA screen.  Her BUN of 26 rest her metabolic panel was normal.  CBC was normal.  She will be discharged.  Note to Patient  The 21st Century Cures Act makes medical notes like these available to patients in the interest of transparency. However, be advised this is a medical document and is intended as unou-xr-hapw communication; it is written in medical language and may appear  blunt, direct, or contain abbreviations or verbiage that are unfamiliar. Medical documents are intended to carry relevant information, facts as evident, and the clinical opinion of the practitioner.  I have considered other serious etiologies for this patient's complaints, however the presentation is not consistent with such entities. Patient or caregiver understands the course of events that occurred in the emergency department. Instructed to return to emergency department or contact PCP for persistent, recurrent, or worsening symptoms.  ^^Please note that this report has been produced using speech recognition software and may contain errors related to that system including, but not limited to, errors in grammar, punctuation, and spelling, as well as words and phrases that possibly may have been recognized inappropriately.  If there are any questions or concerns, contact the dictating provider for clarification.  Medical Decision Making      Disposition and Plan     Clinical Impression:  1. Fall, initial encounter    2. Osteonecrosis of right hip (HCC)         Disposition:  Discharge  10/6/2024  9:51 am    Follow-up:  Storm Mittal  [address] [place] [address]  [phone]    Schedule an appointment as soon as possible for a visit in 2 day(s)            Medications Prescribed:  Current Discharge Medication List              Supplementary Documentation:

## 2024-10-06 NOTE — ED QUICK NOTES
This nurse spoke with nurse at [facility], gave updated report on patient coming back to NH. Verbalized understanding. Daughter at bedside, aware of patient going back to NH.

## 2024-10-06 NOTE — ED INITIAL ASSESSMENT (HPI)
Pt presents to ED via [facility] from [facility] for a fall that happened Thursday. Xray completed Friday and showed possible subcapital fracture of right femoral neck. Pt is a/o x1, dementi. Pt denies pain at this time.

## 2024-10-07 LAB
ATRIAL RATE: 50 BPM
P AXIS: 81 DEGREES
P-R INTERVAL: 216 MS
Q-T INTERVAL: 506 MS
QRS DURATION: 140 MS
QTC CALCULATION (BEZET): 461 MS
R AXIS: -53 DEGREES
T AXIS: 111 DEGREES
VENTRICULAR RATE: 50 BPM

## 2024-10-09 ENCOUNTER — HOSPITAL ENCOUNTER (EMERGENCY)
Facility: HOSPITAL | Age: 89
Discharge: HOME | End: 2024-10-09
Attending: EMERGENCY MEDICINE
Payer: MEDICARE

## 2024-10-09 ENCOUNTER — APPOINTMENT (OUTPATIENT)
Dept: GENERAL RADIOLOGY | Facility: HOSPITAL | Age: 89
End: 2024-10-09
Attending: EMERGENCY MEDICINE
Payer: MEDICARE

## 2024-10-09 VITALS
DIASTOLIC BLOOD PRESSURE: 78 MMHG | TEMPERATURE: 98 F | SYSTOLIC BLOOD PRESSURE: 106 MMHG | OXYGEN SATURATION: 100 % | RESPIRATION RATE: 16 BRPM | HEART RATE: 59 BPM

## 2024-10-09 DIAGNOSIS — M80.059P: Primary | ICD-10-CM

## 2024-10-09 PROCEDURE — 73560 X-RAY EXAM OF KNEE 1 OR 2: CPT | Performed by: EMERGENCY MEDICINE

## 2024-10-09 PROCEDURE — 99283 EMERGENCY DEPT VISIT LOW MDM: CPT

## 2024-10-09 PROCEDURE — 73502 X-RAY EXAM HIP UNI 2-3 VIEWS: CPT | Performed by: EMERGENCY MEDICINE

## 2024-10-09 PROCEDURE — 73552 X-RAY EXAM OF FEMUR 2/>: CPT | Performed by: EMERGENCY MEDICINE

## 2024-10-09 PROCEDURE — 99284 EMERGENCY DEPT VISIT MOD MDM: CPT

## 2024-10-09 RX ORDER — ACETAMINOPHEN 325 MG/1
650 TABLET ORAL 3 TIMES DAILY
COMMUNITY

## 2024-10-09 NOTE — ED QUICK NOTES
Spoke to Elizabeth CEDEÑO from [facility] regarding pt xray results and informing she will be discharged.

## 2024-10-09 NOTE — ED INITIAL ASSESSMENT (HPI)
Pt to ED after unwitnessed fall at 0630 this morning, was found next to her bed, initially c/o R hip pain. had xray done saying R femur subcapital fracture. A&Ox1 per baseline, no thinners.

## 2024-10-09 NOTE — ED PROVIDER NOTES
Patient Seen in: [facility] Emergency Department      History     Chief Complaint   Patient presents with    Fall    Leg or Foot Injury     Stated Complaint: fall, femur fx    Subjective:   HPI      Patient here with daughter at bedside, there is concern for femur fracture.  She has a medical history as noted below.  Per daughter he does not ambulate.    She was found next her bed this morning at 630.  An x-ray was done of her hip notes concern for fracture.  Of note, daughter states that she was here few days ago, was told that there are some chronic changes of her hip but there is no fracture.  The patient denies any pain in her hip.  She apparently had some transient knee pain but at the time of this evaluation is denying any pain and is in fact displaying excellent mobility of both of her hips and knees voluntarily.      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              No pertinent social history.                Physical Exam     ED Triage Vitals   BP 10/09/24 1610 123/72   Pulse 10/09/24 1610 59   Resp 10/09/24 1610 16   Temp 10/09/24 1621 98 °F (36.7 °C)   Temp src 10/09/24 1621 Temporal   SpO2 10/09/24 1610 96 %   O2 Device 10/09/24 1610 None (Room air)       Current Vitals:   Vital Signs  BP: 106/78  Pulse: 59  Resp: 16  Temp: 98 °F (36.7 °C)  Temp src: Temporal  MAP (mmHg): 85    Oxygen Therapy  SpO2: 100 %  O2 Device: None (Room air)        Physical Exam    Physical Exam   Constitutional: Awake, alert, well appearing  Head: Normocephalic and atraumatic.   Eyes: Conjunctivae are normal. Pupils are equal, round, and reactive to light.   Neck: Normal range of motion. No JVD  Cardiovascular: Normal rate, regular rhythm  Pulmonary/Chest: Normal  effort.  No accessory muscle use.  No cyanosis.  Abdominal: Soft. Not distended.  Neurological: Pt is alert and oriented to person, place, and time. no cranial nerve deficits. Speech fluent       right leg is shortened and ready other side but warm well-perfused full range of motion at the knee without any issues, she is somewhat limited for with  external rotation but I can flex and extend at the hip without any restriction and she has good internal rotation.  No pain to palpation.  Exam of the opposite hip is unremarkable.    No midline cervical thoracic spinal tenderness, normocephalic atraumatic.  Patient states she did not have any head trauma.      ED Course   Labs Reviewed - No data to display         XR HIP W OR WO PELVIS 2 OR 3 VIEWS, RIGHT (CPT=73502)    Result Date: 10/9/2024  CONCLUSION:  Chronic impacted right femoral neck fracture.  The appearance is similar to 2020.   LOCATION:  [facility]   Dictated by (CST): Jose Harper MD on 10/09/2024 at 5:33 PM     Finalized by (CST): Jose Harper MD on 10/09/2024 at 5:34 PM       XR FEMUR MIN 2 VIEWS RIGHT (CPT=73552)    Result Date: 10/9/2024  CONCLUSION:  Impacted femoral neck fracture.  This appears to be a chronic finding, as it appears similar to the prior radiograph from 2020.   LOCATION:  [facility]   Dictated by (CST): Jose Harper MD on 10/09/2024 at 5:30 PM     Finalized by (CST): Jose Harper MD on 10/09/2024 at 5:33 PM       XR KNEE (1 OR 2 VIEWS), RIGHT (CPT=73560)    Result Date: 10/9/2024  CONCLUSION:  Negative for acute fracture.   LOCATION:  [facility]   Dictated by (CST): Jose Harper MD on 10/09/2024 at 5:29 PM     Finalized by (CST): Jose Harper MD on 10/09/2024 at 5:30 PM             MDM              Differential diagnoses considered: Hip fracture, pelvis fracture, femur fracture    -Clinical exam does not suggest a fracture.  -Patient is not ambulatory at baseline, unless there is a obvious displaced fracture that would have a meaningful impact on her  quality of life or wellbeing plan on discharge home which is also the patient and daughters wish.      I looked at the x-rays of the hip, knee and femur I do not see any obvious displaced fractures.  Obvious chronic changes in the right hip.      Late addendum:  X-ray result discussed with patient and daughter, chronic fracture, it has been there for at least 4 years.  They are in understanding further evaluation which I think is appropriate.  Will discharge as per patient request    *Discussion of ongoing management of this patient's care included: n/a  *Comorbidities contributing to the complexity of decision making: n/a  *External charts reviewed: n/a  *Additional sources of history: n/a    Shared decision making was done by: patient, myself.          Medical Decision Making      Disposition and Plan     Clinical Impression:  1. Hip fracture due to osteoporosis with malunion, subsequent encounter         Disposition:  Discharge  10/9/2024  5:55 pm    Follow-up:  Nonstaff, Physician    Follow up            Medications Prescribed:  Discharge Medication List as of 10/9/2024  5:58 PM              Supplementary Documentation:

## 2024-10-10 ENCOUNTER — TELEPHONE (OUTPATIENT)
Dept: ORTHOPEDICS CLINIC | Facility: CLINIC | Age: 89
End: 2024-10-10

## 2024-10-10 NOTE — TELEPHONE ENCOUNTER
Spoke with Gris at [facility] to schedule patient  for right femoral neck fracture.  Gris will make sure patient has transportation and family will be notified to come with.          Future Appointments   Date Time Provider Department Center   10/17/2024  9:00 AM Pancho Abdullahi MD [facility] [address]

## 2024-10-10 NOTE — TELEPHONE ENCOUNTER
89 yr old female.  Ok to wait for appointment next week?  Please advise.  Thankxyou!    DOI 10/4/24 Fall  Xray 10/9/24    INDINGS:  There is deformity at hip consistent with an impacted subcapital fracture.  There is moderate to severe underlying hip arthritis.  The visualized pelvic ring appears intact.  Demineralization likely reflects osteoporosis.      Impression   CONCLUSION:  Impacted femoral neck fracture.  This appears to be a chronic finding, as it appears similar to the prior radiograph from 2020.      Billing Type: Third-Party Bill

## 2024-10-10 NOTE — TELEPHONE ENCOUNTER
Patient have a chronic impacted right femoral neck fracture. Please advise on available appointment time.

## 2024-10-10 NOTE — TELEPHONE ENCOUNTER
Looks like she was evaluated in the ER yesterday for this. She is unable to walk at baseline according to their note from yesterday.. If she has new onset pain or new inability to walk I would say not okay to wait. But if she is doing about the same as yesterday, I don't know if it makes sense to send her back to the ER today. Please have her come with family or a guardian next week

## (undated) DEVICE — UNIVERSAL STERIBUMP® STERILE (5/CASE): Brand: UNIVERSAL STERIBUMP®

## (undated) DEVICE — NEEDLE SPINAL 18X3-1/2 PINK.

## (undated) DEVICE — Device: Brand: STABLECUT®

## (undated) DEVICE — SUTURE TICRON 5 3027-79

## (undated) DEVICE — HIGH CAPACITY INTRAMEDULLARY BRUSH TIP: Brand: PULSAVAC®

## (undated) DEVICE — MLPD DISPOSABLE PAD (6' ROLL) 3 ROLLS: Brand: SCHAERER MEDICAL USA

## (undated) DEVICE — CONVERTORS STOCKINETTE: Brand: CONVERTORS

## (undated) DEVICE — DERMABOND LIQUID ADHESIVE

## (undated) DEVICE — THROAT PACKING X-RAY DETECT

## (undated) DEVICE — BLADE ELECTRODE: Brand: EDGE

## (undated) DEVICE — CHLORAPREP 26ML APPLICATOR

## (undated) DEVICE — Device: Brand: POWER-FLO®

## (undated) DEVICE — DRAPE,U/SHT,SPLIT,FILM,60X84,STERILE: Brand: MEDLINE

## (undated) DEVICE — TOTAL HIP CDS: Brand: MEDLINE INDUSTRIES, INC.

## (undated) DEVICE — HOOD, PEEL-AWAY: Brand: FLYTE

## (undated) DEVICE — STERILE SYNTHETIC POLYISOPRENE POWDER-FREE SURGICAL GLOVES WITH HYDROGEL COATING, SMOOTH FINISH, STRAIGHT FINGER: Brand: PROTEXIS

## (undated) DEVICE — 3M™ STERI-STRIP™ REINFORCED ADHESIVE SKIN CLOSURES, R1547, 1/2 IN X 4 IN (12 MM X 100 MM), 6 STRIPS/ENVELOPE: Brand: 3M™ STERI-STRIP™

## (undated) DEVICE — SUTURE ETHIBOND 2 V-37

## (undated) DEVICE — REM POLYHESIVE ADULT PATIENT RETURN ELECTRODE: Brand: VALLEYLAB

## (undated) DEVICE — SUTURE MONOCRYL 4-0 PS-2

## (undated) DEVICE — STERILE HOOK LOCK LATEX FREE ELASTIC BANDAGE 6INX5YD: Brand: HOOK LOCK™

## (undated) DEVICE — FAN SPRAY KIT: Brand: PULSAVAC®

## (undated) DEVICE — SOL  .9 3000ML

## (undated) DEVICE — 3M™ IOBAN™ 2 ANTIMICROBIAL INCISE DRAPE 6651EZ: Brand: IOBAN™ 2

## (undated) DEVICE — STERILE POLYISOPRENE POWDER-FREE SURGICAL GLOVES: Brand: PROTEXIS

## (undated) DEVICE — PILLOW ABDUCTION HIP SM

## (undated) DEVICE — KENDALL SCD EXPRESS SLEEVES, KNEE LENGTH, MEDIUM: Brand: KENDALL SCD

## (undated) DEVICE — SUTURE VICRYL 2-0 CP-1

## (undated) DEVICE — Device

## (undated) DEVICE — INTENDED TO AID IN THE PASSING OF SUTURES THROUGH BONE AND SOFT TISSUE DURING ORTHOPEDIC SURGERY: Brand: HOFFEE SUTURE RETRIEVER

## (undated) DEVICE — SPECIMEN CONTAINER,POSITIVE SEAL INDICATOR, OR PACKAGED: Brand: PRECISION

## (undated) DEVICE — DRESSING AQUACEL AG 3.5 X 10

## (undated) DEVICE — GOWN SURG AERO CHROME XXL

## (undated) DEVICE — ALCOHOL 70% 4 OZ

## (undated) DEVICE — DRAIN RELIAVAC W/DRN MED 1/8

## (undated) NOTE — ED AVS SNAPSHOT
Marium Munoz   MRN: [id number]    Department:  THE [facility] Emergency Department in [place]   Date of Visit:  8/18/2017           Disclosure     Insurance plans vary and the physician(s) referred by the ER may not be covered by your plan.  Please cont If you have been prescribed any medication(s), please fill your prescription right away and begin taking the medication(s) as directed    If the emergency physician has read X-rays, these will be re-interpreted by a radiologist.  If there is a significant

## (undated) NOTE — IP AVS SNAPSHOT
Patient Demographics     Address  [address] [place] [address] [address] [place] [address] Phone  [phone] (Home) *Preferred*  [phone] (Mobile)      Patient Contacts     Name Relation Home Work Mobile    SUJATA AL   [phone]    Abhi Bartlett Spouse [phone]    Negro Keating Power of  [phone]        Allergies as of 5/31/2024  Review status set to In Progress on 5/26/2024       Noted Reaction Type Reactions    Aspirin 09/21/2020    OTHER (SEE COMMENTS)    Stomach upset      Code Status Information     Code Status    Full Code        Patient Instructions       Check CT head in 3 weeks. Stay off antiplatelets and anticoagulation until then.     Follow-up Information     Storm Mittal. Schedule an appointment as soon as possible for a visit in 1 week(s).    Specialty: SURGERY, PLASTIC  Contact information:  [address] [place] [address]  [phone]                        Your Home Meds List      TAKE these medications       Instructions Authorizing Provider Morning Afternoon Evening As Needed   isosorbide mononitrate ER 30 MG Tb24  Commonly known as: Imdur      Take 1 tablet (30 mg total) by mouth daily.          levETIRAcetam 500 MG Tabs  Commonly known as: Keppra      Take 1 tablet (500 mg total) by mouth 2 (two) times daily.   Erwin Rivas         lisinopril 5 MG Tabs  Commonly known as: Prinivil; Zestril      Take 1 tablet (5 mg total) by mouth daily.   Erwin Rivas         memantine 5 MG Tabs  Commonly known as: Namenda      Take 1 tablet (5 mg total) by mouth daily.   Erwin Rivas         metoprolol tartrate 25 MG Tabs  Commonly known as: Lopressor      Take 1 tablet (25 mg total) by mouth daily.          simvastatin 40 MG Tabs  Commonly known as: Zocor      Take 1 tablet (40 mg total) by mouth daily.                Where to Get Your Medications      These medications were sent to [facility]/pharmacy #[id number] - [place], [place] - [address] [address] [facility] [address] OF [address] [place] [address],  [phone], [phone]  [address] [address], [facility] [address]    Phone: [phone]   levETIRAcetam 500 MG Tabs  lisinopril 5 MG Tabs  memantine 5 MG Tabs           [id number] - MAR ACTION REPORT  (last 48 hrs)    ** SITE UNKNOWN **     Order ID Medication Name Action Time Action Reason Comments    [id number] HYDROcodone-acetaminophen (Norco) 5-325 MG per tab 1 tablet (Or Linked Group #1) 05/29/24 2146 Given      [id number] HYDROcodone-acetaminophen (Norco) 5-325 MG per tab 1 tablet 05/31/24 0926 Given      [id number] acetaminophen (Tylenol Extra Strength) tab 1,000 mg (Or Linked Group #1) 05/30/24 2152 Given      [id number] acetaminophen (Tylenol) tab 650 mg (Or Linked Group #2) 05/31/24 0642 Given      [id number] atorvastatin (Lipitor) tab 20 mg 05/29/24 2100 Given      [id number] atorvastatin (Lipitor) tab 20 mg 05/30/24 2152 Given      [id number] calcium carbonate-vitamin D (Oyster Shell-D) 250-3.125 MG-MCG per tab 2 tablet 05/29/24 1722 Given      [id number] calcium carbonate-vitamin D (Oyster Shell-D) 250-3.125 MG-MCG per tab 2 tablet 05/30/24 0928 Given      [id number] calcium carbonate-vitamin D (Oyster Shell-D) 250-3.125 MG-MCG per tab 2 tablet 05/30/24 1342 Given      [id number] calcium carbonate-vitamin D (Oyster Shell-D) 250-3.125 MG-MCG per tab 2 tablet 05/30/24 1824 Given      [id number] calcium carbonate-vitamin D (Oyster Shell-D) 250-3.125 MG-MCG per tab 2 tablet 05/31/24 0927 Given      [id number] calcium carbonate-vitamin D (Oyster Shell-D) 250-3.125 MG-MCG per tab 2 tablet 05/31/24 1303 Given      [id number] isosorbide mononitrate ER (Imdur) 24 hr tab 30 mg 05/30/24 0928 Given      [id number] isosorbide mononitrate ER (Imdur) 24 hr tab 30 mg 05/31/24 0926 Given      [id number] labetalol (Trandate) 5 mg/mL injection 10 mg 05/30/24 0642 Given  170/71. hr 62    [id number] lisinopril (Prinivil; Zestril) tab 5 mg 05/30/24 0928 Given      [id number] lisinopril (Prinivil; Zestril) tab 5 mg 05/31/24 0927 Given       [id number] melatonin tab 3 mg 05/30/24 2152 Given      [id number] metoprolol succinate ER (Toprol XL) 24 hr tab 25 mg 05/30/24 0603 Given      [id number] metoprolol succinate ER (Toprol XL) 24 hr tab 25 mg 05/31/24 0636 Given by Other      [id number] nystatin (Mycostatin) 222245 UNIT/ML oral suspension 500,000 Units 05/29/24 1722 Given      [id number] nystatin (Mycostatin) 445221 UNIT/ML oral suspension 500,000 Units 05/29/24 2146 Given      [id number] nystatin (Mycostatin) 771756 UNIT/ML oral suspension 500,000 Units 05/30/24 0927 Given      [id number] nystatin (Mycostatin) 572823 UNIT/ML oral suspension 500,000 Units 05/30/24 1342 Given      [id number] nystatin (Mycostatin) 145280 UNIT/ML oral suspension 500,000 Units 05/30/24 1824 Given      [id number] nystatin (Mycostatin) 534585 UNIT/ML oral suspension 500,000 Units 05/30/24 2151 Given      [id number] nystatin (Mycostatin) 334938 UNIT/ML oral suspension 500,000 Units 05/31/24 0927 Given      [id number] nystatin (Mycostatin) 488485 UNIT/ML oral suspension 500,000 Units 05/31/24 1303 Given      [id number] sennosides (Senokot) tab 17.2 mg 05/29/24 2145 Given      [id number] sennosides (Senokot) tab 17.2 mg 05/30/24 2151 Given              Recent Vital Signs    Flowsheet Row Most Recent Value   /69 Filed at 05/31/2024 1208   Pulse 56 Filed at 05/31/2024 1208   Resp 20 Filed at 05/31/2024 1208   Temp 98.1 °F (36.7 °C) Filed at 05/31/2024 1208   SpO2 99 % Filed at 05/31/2024 1208      Patient's Most Recent Weight    Flowsheet Row Most Recent Value   Patient Weight 49.1 kg (108 lb 3.9 oz)         Lab Results Last 24 Hours      Basic Metabolic Panel (8) [[id number]] (Abnormal)  Resulted: 05/31/24 1252, Result status: Final result   Ordering provider: Erwin Rivas DO  05/31/24 1142 Resulting lab: [facility] ([facility])    Specimen Information    Type Source Collected On   Blood — 05/31/24 1216          Components    Component Value Reference Range  Flag Lab   Glucose 125 70 - 99 mg/dL [facility] ([facility])   Sodium 139 136 - 145 mmol/L — [facility] ([facility])   Potassium 3.9 3.5 - 5.1 mmol/L — [facility] ([facility])   Chloride 107 98 - 112 mmol/L — [facility] ([facility])   CO2 26.0 21.0 - 32.0 mmol/L — [facility] ([facility])   Anion Gap 6 0 - 18 mmol/L — [facility] ([facility])   BUN 19 9 - 23 mg/dL — [facility] ([facility])   Creatinine 0.76 0.55 - 1.02 mg/dL — [facility] ([facility])   Calcium, Total 9.6 8.5 - 10.1 mg/dL — [facility] ([facility])   Calculated Osmolality 292 275 - 295 mOsm/kg — [facility] ([facility])   eGFR-Cr 72 >=60 mL/min/1.73m2 — [facility] ([facility])            CBC With Differential With Platelet [[id number]]  Resulted: 05/31/24 1239, Result status: Final result   Ordering provider: Erwin Rivas DO  05/31/24 1142 Resulting lab: [facility] ([facility])   Narrative:  The following orders were created for panel order CBC With Differential With Platelet.  Procedure                               Abnormality         Status                     ---------                               -----------         ------                     CBC W/ DIFFERENTIAL[[id number]]                              Final result                 Please view results for these tests on the individual orders.    Specimen Information    Type Source Collected On   Blood — 05/31/24 1216            Testing Performed By     Lab - Abbreviation Name Director Address Valid Date Range    139 - [facility] ([facility]) [facility] ([facility]) Goldberg, Cathryn A. MD [address] [place] [id number] 03/19/20 1441 - Present            Microbiology Results (All)     None         H&P - H&P Note      H&P signed by Kathy Leal DO at 5/26/2024  7:01 PM  Version 1 of 1    Author: Kathy Leal DO Service: — Author Type: Physician    Filed: 5/26/2024  7:01 PM Date of Service: 5/26/2024  4:35 PM Status: Signed    : Kathy Leal DO (Physician)         [facility]  [facility]     Tierra Anderson Patient Status:  Emergency    1927 MRN [id number]   [facility] EMERGENCY DEPARTMENT Attending Demond Anderson MD   Hosp Day # 0 PCP RAMON WU     Chief Complaint: back pain    Subjective:    History of Present Illness:     Tierra Anderson is a 89 year old female with pmhx of CAD, HTN, HLD who presents from home with complaint of back pain. Pt is a poor historian and limited d/t pain at time of encounter. History obtained from ED signout, chart review, and daughter at bedside. Daughter reports pt was just released from OSH ([facility]) a few days ago after a fall and was told she \"had a mild heart attack\". She was treated supportively and her cardiac medication regimen was adjusted. She was doing well yesterday at home after discharge. This morning, she felt weak and slid off the couch. She did not hit her head or lose consciousness. Denies any chest pain/pressure, SOB/TOUSSAINT, palpitations, abdominal pain, n/v/d, fevers/chills, urinary symptoms. She was able to pull herself back up onto the cough, but had severe L sided back pain. She asked her daughter to call 911 d/t severity of pain.     History/Other:    Past Medical History:  Past Medical History:    Coronary atherosclerosis    Essential hypertension    High blood pressure    High cholesterol     Past Surgical History:   History reviewed. No pertinent surgical history.   Family History:   No family history on file.  Social History:    reports that she has never smoked. She has never used smokeless tobacco. She reports that she does not currently use alcohol. She reports that she does not use drugs.     Allergies:   Allergies   Allergen Reactions    Aspirin OTHER (SEE COMMENTS)     Stomach upset       Medications:    No current facility-administered medications on file prior to encounter.     Current Outpatient Medications on File Prior to Encounter   Medication Sig Dispense Refill    atorvastatin 20 MG  Oral Tab Take 1 tablet (20 mg total) by mouth nightly. 30 tablet 0    aspirin 81 MG Oral Chew Tab Chew 1 tablet (81 mg total) by mouth daily. 30 tablet 0    lisinopril 10 MG Oral Tab Take 1 tablet (10 mg total) by mouth daily. 30 tablet 0    metoprolol succinate ER 25 MG Oral Tablet 24 Hr Take 1 tablet (25 mg total) by mouth Daily Beta Blocker. 30 tablet 0    Calcium Carb-Cholecalciferol (CALCIUM CARBONATE-VITAMIN D) 250-125 MG-UNIT Oral Tab Take 2 tablets by mouth 3 (three) times daily with meals. 100 tablet 0    docusate sodium 100 MG Oral Cap Take 100 mg by mouth 2 (two) times daily as needed for constipation. 60 capsule 0    Senna 17.2 MG Oral Tab Take 1 tablet (17.2 mg total) by mouth nightly. 30 tablet 0    celecoxib 200 MG Oral Cap Take 1 capsule (200 mg total) by mouth 2 (two) times daily.         Review of Systems:   A comprehensive review of systems was completed.    Pertinent positives and negatives noted in the HPI.    Objective:   Physical Exam:    /80   Pulse 111   Temp 98.7 °F (37.1 °C) (Oral)   Resp 22   Ht 5' 3\" (1.6 m)   Wt 110 lb (49.9 kg)   SpO2 95%   BMI 19.49 kg/m²   General:in severe pain; elderly, frail/cachetic appearing   Respiratory: No rhonchi, no wheezes  Cardiovascular: S1, S2. Regular rate and rhythm  Abdomen: Soft, Non-tender, non-distended, positive bowel sounds  Neuro: No new focal deficits  Extremities: No edema      Results:    Labs:      Labs Last 24 Hours:    No results for input(s): \"RBC\", \"HGB\", \"HCT\", \"MCV\", \"MCH\", \"MCHC\", \"RDW\", \"NEPRELIM\", \"WBC\", \"PLT\" in the last 168 hours.    No results for input(s): \"GLU\", \"BUN\", \"CREATSERUM\", \"GFRAA\", \"GFRNAA\", \"EGFRCR\", \"CA\", \"ALB\", \"NA\", \"K\", \"CL\", \"CO2\", \"ALKPHO\", \"AST\", \"ALT\", \"BILT\", \"TP\" in the last 168 hours.    Lab Results   Component Value Date    INR 1.04 03/29/2020       No results for input(s): \"TROP\", \"TROPHS\", \"CK\" in the last 168 hours.    No results for input(s): \"TROP\", \"PBNP\" in the last 168 hours.    No  results for input(s): \"PCT\" in the last 168 hours.    Imaging: Imaging data reviewed in Epic.    Assessment & Plan:      #Intractable back pain  #Mechanical fall  #Inability to ambulate  #L2 compression fracture  - pain control, anti-emetics PRN  - PT/OT    #Acute kidney injury  - gentle IVF  - hold NSAIDs, ACEi    #Hyperglycemia, suspect reactive    #HTN  - metoprolol  - hold lisinopril d/t WESLEY > resume in am if renal function improving    #HLD  - statin    #CAD  - aspirin, statin        Plan of care discussed with pt, pt's daughter, ED    Kathy Leal DO    Supplementary Documentation:     The 21st Century Cures Act makes medical notes like these available to patients in the interest of transparency. Please be advised this is a medical document. Medical documents are intended to carry relevant information, facts as evident, and the clinical opinion of the practitioner. The medical note is intended as peer to peer communication and may appear blunt or direct. It is written in medical language and may contain abbreviations or verbiage that are unfamiliar.                                   Electronically signed by Kathy Leal DO on 5/26/2024  7:01 PM              Consults - MD Consult Notes      Consults signed by Rich Ruvalcaba MD at 5/28/2024  1:15 PM     Author: Rich Ruvalcaba MD Service: Neurology Author Type: Physician    Filed: 5/28/2024  1:15 PM Date of Service: 5/28/2024 12:50 PM Status: Signed    : Rich Ruvalcaba MD (Physician)     Consult Orders    1. Consult to Neurology [[id number]] ordered by Kathy Leal DO at 05/27/24 1403             [facility]   NEUROLOGY   CONSULT NOTE    Admission date: 5/26/2024  Reason for Consult: Fall/stroke.  Chief Complaint:   Chief Complaint   Patient presents with    Fall   ________________________________________________________________    History     History of Presenting Illness  89 year old female with  coronary disease, hypertension, hyperlipidemia consulted for acute stroke.  Patient was initially admitted after a fall and was evaluated by orthopedics for compression fracture of the spine.  Patient was noted to be more confused and disoriented and a CT scan of the head was performed which reported subacute infarct in the right frontoparietal area with hemorrhagic conversion.  MRI performed reported hemorrhagic infarct in the right parietal lobe measuring 4.3 x 3.5 cm.  Also reported separate punctate acute infarcts within the parasagittal frontal lobes.  Patient is currently awake, responsive, answering questions denies to be in any significant pain.  No witnessed episode of seizure noted daughter has noted patient to be intermittently confused.      Source of history was patient's daughter and chart review.      Past Medical History:    Coronary atherosclerosis    Essential hypertension    High blood pressure    High cholesterol     History reviewed. No pertinent surgical history.  Social History     Socioeconomic History    Marital status:    Tobacco Use    Smoking status: Never    Smokeless tobacco: Never   Vaping Use    Vaping status: Never Used   Substance and Sexual Activity    Alcohol use: Not Currently    Drug use: Never     Social Determinants of Health     Food Insecurity: No Food Insecurity (5/26/2024)    Food Insecurity     Food Insecurity: Never true   Transportation Needs: No Transportation Needs (5/26/2024)    Transportation Needs     Lack of Transportation: No   Physical Activity: Inactive (2/25/2021)    Received from [facility] [employer] [facility]    Exercise Vital Sign     Days of Exercise per Week: 0 days     Minutes of Exercise per Session: 0 min   Housing Stability: Low Risk  (5/26/2024)    Housing Stability     Housing Instability: No     No family history on file.  Allergies   Allergies   Allergen Reactions    Aspirin OTHER (SEE COMMENTS)     Stomach upset       Home  Meds  Current Outpatient Medications   Medication Instructions    aspirin 81 mg, Oral, Daily    atorvastatin (LIPITOR) 20 mg, Oral, Nightly    Calcium Carb-Cholecalciferol (CALCIUM CARBONATE-VITAMIN D) 250-125 MG-UNIT Oral Tab 2 tablets, Oral, 3 times daily with meals    carvedilol (COREG) 3.125 mg, Oral, 2 times daily with meals    celecoxib (CELEBREX) 200 mg, Oral, 2 times daily    docusate sodium (COLACE) 100 mg, Oral, 2 times daily PRN    isosorbide mononitrate ER (IMDUR) 30 mg, Oral, Daily    lisinopril (ZESTRIL) 10 mg, Oral, Daily    metoprolol succinate ER (TOPROL XL) 25 mg, Oral, Daily Beta Blocker    Sennosides 17.2 mg, Oral, Nightly     Scheduled Meds:   nystatin  5 mL Oral QID    isosorbide mononitrate ER  30 mg Oral Daily    calcium carbonate-vitamin D  2 tablet Oral TID CC    sennosides  17.2 mg Oral Nightly    atorvastatin  20 mg Oral Nightly    metoprolol succinate ER  25 mg Oral Daily Beta Blocker    acetaminophen  1,000 mg Oral TID    Or    HYDROcodone-acetaminophen  1 tablet Oral TID    Or    HYDROcodone-acetaminophen  2 tablet Oral TID     Continuous Infusions:   sodium chloride 75 mL/hr at 05/27/24 1842     PRN Meds:  acetaminophen **OR** acetaminophen    labetalol    hydrALAzine    melatonin    ondansetron    acetaminophen    polyethylene glycol (PEG 3350)    sennosides    bisacodyl    fleet enema    morphINE **OR** morphINE **OR** morphINE    OBJECTIVE   VITAL SIGNS:   Temp:  [97.5 °F (36.4 °C)-98.6 °F (37 °C)] 97.9 °F (36.6 °C)  Pulse:  [66-92] 73  Resp:  [15-24] 18  BP: ()/(65-90) 136/90  SpO2:  [94 %-99 %] 99 %    PHYSICAL EXAM:    NEUROLOGIC:    Mental Status: Awake, alert, answering questions, following simple commands, no aphasia or dysarthria is noted.    Cranial nerves: PERRL.  Mild left-sided neglect with left visual field defect.  EOMI. mild left nasolabial fold flattening, Hearing grossly decreased. Tongue midline with normal movements.   Motor: Left plantar drift with mild  weakness in left upper extremity noted.  The rest of the extremities had normal strength.    Sensation: Intact to light touch bilaterally  Cerebellar: Normal Finger-To-Nose test      LABORATORY DATA:  Last 24 hour labs were reviewed in detail.  Recent Labs   Lab 05/27/24 0409 05/28/24  0752     --    K 3.8 4.2     --    CO2 25.0  --    *  --    BUN 30*  --    CREATSERUM 0.94  --      Recent Labs   Lab 05/27/24  0409   WBC 11.5*   HGB 14.5   .0*     No results for input(s): \"TOTALPROTEIN\", \"ALBUMIN\", \"ALT\", \"AST\" in the last 168 hours.    Invalid input(s): \"TOTALBILIRUBIN\", \"ALKPHOSPHATE\"  No results for input(s): \"MG\", \"PHOS\" in the last 168 hours.  Last A1c value was  % done  .           Radiology:    MRI BRAIN MRA BRAIN MRA NECK (CPT=70551/44276/63631)    Result Date: 5/28/2024  CONCLUSION:  See above.   Findings discussed with the patient's nurse Ms. Ray at 0210 hours on 5/28/2024.   LOCATION:  [facility]   Dictated by (CST): Stromberg, LeRoy, MD on 5/28/2024 at 2:03 AM     Finalized by (CST): Stromberg, LeRoy, MD on 5/28/2024 at 2:12 AM      ASSESSMENT/PLAN   89 year old female with:    Left parietal hemorrhagic infarct.  Patient currently off antiplatelet medications.  MRA of head and neck pending.  Will also start patient on Keppra 500 mg twice daily.  Advised OT/PT/rehab eval.  Systolic blood pressure target 110-1 50.  Will request neurosurgery input.  Encephalopathy.  As mentioned above we will start patient on Keppra for seizure prophylaxis which could be contributing.  Will check EEG, ammonia level and TSH.  OT/PT/rehab eval for neglect, visual field cut.  Fall precaution and seizure precaution advised..  Mild to moderate dementia.  Compression fracture of the spine.  Orthopedic/spine for further management.  Situation discussed with patient's daughter.    Principal Problem:    Closed compression fracture of L2 vertebra, initial encounter ([facility])  Active Problems:    Inability to  ambulate due to multiple joints    Intractable back pain    WESLEY (acute kidney injury) (HCC)    Cerebrovascular accident (CVA) (HCC)       Rich Ruvalcaba MD  Neurohospitalist  [facility]    Disclaimer: This record was dictated using Dragon software. There may be errors due to voice recognition problems that were not realized and corrected during the completion of the note.        Electronically signed by Rich Ruvalcaba MD on 2024  1:15 PM              Discharge Summary - D/C Summary      Discharge Summary signed by Erwin Rivas DO at 2024  2:51 PM  Version 2 of 2    Author: Erwin Rivas DO Service: Hospitalist Author Type: Physician    Filed: 2024  2:51 PM Date of Service: 2024  2:43 PM Status: Addendum    : Erwin Rivas DO (Physician)    Related Notes: Original Note by Erwin Rivas DO (Physician) filed at 2024  2:46 PM       [facility]PITAL[facility]  DISCHARGE SUMMARY     Tierra Anderson Patient Status:  Inpatient    1927 MRN [id number]   Location [facility] 3NE-A Attending Erwin Rivas DO   Hosp Day # 3 PCP RAMON WU     Date of Admission: 2024  Date of Discharge: 2024  Discharge Disposition: Acute rehab    Discharge Diagnosis:   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 Pt is a poor historian and limited d/t pain at time of encounter. History obtained from ED signout, chart review, and daughter at bedside. Daughter reports pt was just released from OSH ([facility]) a few days ago after a fall and was told she \"had a mild heart attack\". She was treated supportively and her cardiac medication regimen was adjusted. She  was doing well yesterday at home after discharge. This morning, she felt weak and slid off the couch. She did not hit her head or lose consciousness. Denies any chest pain/pressure, SOB/TOUSSAINT, palpitations, abdominal pain, n/v/d, fevers/chills, urinary symptoms. She was able to pull herself back up onto the cough, but had severe L sided back pain. She asked her daughter to call 911 d/t severity of pain.     Brief Synopsis: Patient was found to have L2 compression fracture.  Neurosurgery did not recommend any acute surgical intervention and LSO brace was recommended.  Pain improved in the hospital.  She was found to have confusion and imaging showed subacute right parietal lobe infarct with hemorrhagic conversion as well as left frontal CVA and right cerebellar CVA.  Echo showed no shunt.  She would benefit from NOAC but is a high fall risk and neuro discussed this with her daughter and plan is to hold off on anticoagulation.  Plans for repeat CT scan in 3 weeks as an outpatient.  Until then, antiplatelets and anticoagulation will be held.  She was discharged to acute rehab in stable condition.    Lace+ Score: 64  59-90 High Risk  29-58 Medium Risk  0-28   Low Risk       TCM Follow-Up Recommendation:  LACE > 58: High Risk of readmission after discharge from the hospital.    Procedures during hospitalization:   None    Incidental or significant findings and recommendations (brief descriptions):  Please see brief synopsis above    Lab/Test results pending at Discharge:   None    Consultants:  Neurosurgery  Neurology  PM&R    Discharge Medication List:     Discharge Medications        START taking these medications        Instructions Prescription details   levETIRAcetam 500 MG Tabs  Commonly known as: Keppra      Take 1 tablet (500 mg total) by mouth 2 (two) times daily.   Quantity: 60 tablet  Refills: 3     memantine 5 MG Tabs  Commonly known as: Namenda      Take 1 tablet (5 mg total) by mouth daily.   Quantity: 30  tablet  Refills: 3            CHANGE how you take these medications        Instructions Prescription details   lisinopril 5 MG Tabs  Commonly known as: Prinivil; Zestril  What changed:   medication strength  how much to take      Take 1 tablet (5 mg total) by mouth daily.   Quantity: 30 tablet  Refills: 0            CONTINUE taking these medications        Instructions Prescription details   isosorbide mononitrate ER 30 MG Tb24  Commonly known as: Imdur      Take 1 tablet (30 mg total) by mouth daily.   Refills: 0     metoprolol tartrate 25 MG Tabs  Commonly known as: Lopressor      Take 1 tablet (25 mg total) by mouth daily.   Refills: 0     simvastatin 40 MG Tabs  Commonly known as: Zocor      Take 1 tablet (40 mg total) by mouth daily.   Refills: 0            STOP taking these medications      aspirin 81 MG Chew        carvedilol 3.125 MG Tabs  Commonly known as: Coreg        celecoxib 200 MG Caps  Commonly known as: CeleBREX                  Where to Get Your Medications        These medications were sent to [facility]/pharmacy #[id number] - [facility] [address] [facility] [address], [phone], [phone]  [address] [place] [address] [address] [address]      Phone: [phone]   levETIRAcetam 500 MG Tabs  lisinopril 5 MG Tabs  memantine 5 MG Tabs         [place] substances prescribed at discharge.    Follow-up appointment:   Storm Mittal  [address] [facility] [address] [phone]    Schedule an appointment as soon as possible for a visit in 1 week(s)      Appointments for Next 30 Days 5/31/2024 - 6/30/2024      None            Vital signs:  Temp:  [96.3 °F (35.7 °C)-98.1 °F (36.7 °C)] 98.1 °F (36.7 °C)  Pulse:  [53-76] 56  Resp:  [18-20] 20  BP: (105-157)/(63-79) 129/69  SpO2:  [96 %-100 %] 99 %    Physical Exam:    See progress note dated same day.  -----------------------------------------------------------------------------------------------  PATIENT DISCHARGE  INSTRUCTIONS: See electronic chart    Erwin Rivas DO    Time spent:  35 minutes    Electronically signed by Erwin Rivas DO on 2024  2:51 PM     Discharge Summary signed by Erwin Rivas DO at 2024  2:46 PM  Version 1 of 2    Author: Erwin Rivas DO Service: Hospitalist Author Type: Physician    Filed: 2024  2:46 PM Date of Service: 2024  2:43 PM Status: Signed    : Erwin Rivas DO (Physician)    Related Notes: Addendum by Erwin Rivas DO (Physician) filed at 2024  2:51 PM       [facility]OSPITALIST  DISCHARGE SUMMARY     Tierra Anderson Patient Status:  Inpatient    1927 MRN [id number]   Location [facility] 3NE-A Attending Erwin Rivas DO   Hosp Day # 3 PCP RAMON WU     Date of Admission: 2024  Date of Discharge: 2024  Discharge Disposition: Acute rehab    Discharge Diagnosis:   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 Pt is a poor historian and limited d/t pain at time of encounter. History obtained from ED signout, chart review, and daughter at bedside. Daughter reports pt was just released from OSH ([facility]) a few days ago after a fall and was told she \"had a mild heart attack\". She was treated supportively and her cardiac medication regimen was adjusted. She was doing well yesterday at home after discharge. This morning, she felt weak and slid off the couch. She did not hit her head or lose consciousness. Denies any chest pain/pressure, SOB/TOUSSAINT, palpitations, abdominal pain, n/v/d, fevers/chills, urinary symptoms. She was able to pull herself back up onto the cough, but had severe L sided back pain. She asked her daughter to call  911 d/t severity of pain.     Brief Synopsis: Patient was found to have L2 compression fracture.  Neurosurgery did not recommend any acute surgical intervention and LSO brace was recommended.  Pain improved in the hospital.  She was found to have confusion and imaging showed subacute right parietal lobe infarct with hemorrhagic conversion as well as left frontal CVA and right cerebellar CVA.  Echo showed no shunt.  She would benefit from NOAC but is a high fall risk and neuro discussed this with her daughter and plan is to hold off on anticoagulation.  Plans for repeat CT scan in 3 weeks as an outpatient.  Until then, antiplatelets and anticoagulation will be held.  She was discharged to acute rehab in stable condition.    Lace+ Score: 64  59-90 High Risk  29-58 Medium Risk  0-28   Low Risk       TCM Follow-Up Recommendation:  LACE > 58: High Risk of readmission after discharge from the hospital.    Procedures during hospitalization:   None    Incidental or significant findings and recommendations (brief descriptions):  Please see brief synopsis above    Lab/Test results pending at Discharge:   None    Consultants:  Neurosurgery  Neurology  PM&R    Discharge Medication List:     Discharge Medications        CHANGE how you take these medications        Instructions Prescription details   lisinopril 5 MG Tabs  Commonly known as: Prinivil; Zestril  What changed:   medication strength  how much to take      Take 1 tablet (5 mg total) by mouth daily.   Quantity: 30 tablet  Refills: 0            CONTINUE taking these medications        Instructions Prescription details   isosorbide mononitrate ER 30 MG Tb24  Commonly known as: Imdur      Take 1 tablet (30 mg total) by mouth daily.   Refills: 0     metoprolol tartrate 25 MG Tabs  Commonly known as: Lopressor      Take 1 tablet (25 mg total) by mouth daily.   Refills: 0     simvastatin 40 MG Tabs  Commonly known as: Zocor      Take 1 tablet (40 mg total) by mouth daily.    Refills: 0            STOP taking these medications      aspirin 81 MG Chew        carvedilol 3.125 MG Tabs  Commonly known as: Coreg        celecoxib 200 MG Caps  Commonly known as: CeleBREX                  Where to Get Your Medications        These medications were sent to [facility]pharmacy #[id number] - [place], [place] - [address] [facility] [address], [phone], [phone]  [address] [facility] [address], [facility] [address]      Phone: [phone]   lisinopril 5 MG Tabs         [place] No [place] substances prescribed at discharge.    Follow-up appointment:   Storm Mittal  [address] [facility] [address] [phone]    Schedule an appointment as soon as possible for a visit in 1 week(s)      Appointments for Next 30 Days 5/31/2024 - 6/30/2024      None            Vital signs:  Temp:  [96.3 °F (35.7 °C)-98.1 °F (36.7 °C)] 98.1 °F (36.7 °C)  Pulse:  [53-76] 56  Resp:  [18-20] 20  BP: (105-157)/(63-79) 129/69  SpO2:  [96 %-100 %] 99 %    Physical Exam:    See progress note dated same day.  -----------------------------------------------------------------------------------------------  PATIENT DISCHARGE INSTRUCTIONS: See electronic chart    Erwin Rivas DO    Time spent:  35 minutes    Electronically signed by Erwin Rivas DO on 5/31/2024  2:46 PM              Physical Therapy Notes (last 72 hours)      Physical Therapy Note signed by Stacey Handley PTA at 5/31/2024 12:12 PM  Version 1 of 1    Author: Stacey Handley PTA Service: Rehab Author Type: Physical Therapy Assistant    Filed: 5/31/2024 12:12 PM Date of Service: 5/31/2024 12:07 PM Status: Signed    : Staecy Handley PTA (Physical Therapy Assistant)          PHYSICAL THERAPY TREATMENT NOTE - INPATIENT    Room Number: 3616/3616-A       Session: 3     Number of Visits to Meet Established Goals: 5    Presenting Problem: s/p fall off couch, Closed compression ofx of L2 vertebra, Acute CVA  Co-Morbidities : CAD, HTN, scoliosis  (per xray L spine)    PHYSICAL THERAPY MEDICAL/SOCIAL HISTORY  History related to current admission: Patient is a 89 year old female admitted on 5/26/2024 from home for fall off couch with c/o back pain.  Pt diagnosed with L2 compression fracture.     HOME SITUATION  Type of Home: House   Home Layout: One level  Stairs to Enter : 2     Lives With: Alone  Drives: Yes  Patient Owned Equipment: Rolling walker  Patient Regularly Uses: Reading glasses     Prior Level of [place]:   Pt questionable historian- reported she lived in house with her spouse, ambulates with rollator, unable to tell much more.  Writer called daughter (Itzel)- pt lives in [facility], stays on main floor of home. Lives alone, but dtr (Itzel) visits everyday. Supposed to ambulate with RW at all times. Up until now she was doing everything on her own- sponge bathing, dressing, etc.   Drives- just passed her drivers test and renewed her license.   Does laundry one in a while, otherwise dtr does it for her. Daughter brings over meals each day.   Writes her own checks for her bills.   Had recent admission at [facility] for \"mild heart attack\" (per dtr) and had a change in meds.    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   XR LUMBAR SPINE:   There is mild to moderate levoscoliosis of the lumbar spine.  There is no acute fracture dislocation.  There is diffuse osteopenia.  There is moderate degenerative disc disease throughout the lumbar spine most pronounced at L3-4.  There is mild to   moderate facet joint degenerative changes on the right greater than left at L3-4 L4-5 and L5-S1.  There is mild depression of the superior endplate of L2 which is an age indeterminate compression fracture.     5/27 MRI Brain    FINDINGS:       Examination is essentially nondiagnostic.  There is a hemorrhagic infarct within the right parietal lobe as noted on the head CT from earlier on  5/27/2024.  This measures 4.3 x 3.5 cm in the axial plane.      There are separate punctate acute infarcts within the parasagittal frontal lobes.      There is moderate prominence of the lateral ventricles.      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  A component this finding could be developmental.      Chronic sphenoid sinusitis.       ASSESSMENT   Patient demonstrates limited progress this session, goals  remain in progress.    Pt now requiring two person assist for sit<>stand - inconsistent volitional effort noted.      Patient continues to function below baseline with bed mobility, transfers, and gait.  Contributing factors to remaining limitations include decreased endurance/aerobic capacity, pain, impaired Standing and sitting balance, impaired coordination, impaired motor planning, decreased muscular endurance, and cognitive deficits (delayed processing/[place]).  Next session anticipate patient to progress bed mobility, transfers, and gait.  Physical Therapy will continue to follow patient for duration of hospitalization.    Patient continues to benefit from continued skilled PT services: to facilitate return to prior level of function as patient demonstrates high motivation with excellent tolerance to an intensive therapy program .    Plan to re-assess discharge rec if patient remains in hospital d/t inconsistent presentation/participation - discussed with MCLEOD River      PT Treatment Plan: Bed mobility;Body mechanics;Coordination;Endurance;Energy conservation;Patient education;Family education;Gait training;Range of motion;Neuromuscular re-educate;Strengthening;Stoop training;Stair training;Transfer training;Balance training  Rehab Potential : Fair  Frequency (Obs): 3-5x/week    CURRENT GOALS     Goal #1 Patient is able to demonstrate supine - sit EOB @ level: supervision      Goal #2 Patient is able to demonstrate transfers Sit to/from Stand at assistance level: supervision      Goal #3 Patient is able  to ambulate 50 feet with assist device: walker - rolling at assistance level: minimum assistance      Goal #4     Goal #5     Goal #6     Goal Comments: Goals established on 2024 all goals ongoing    SUBJECTIVE  \"Wait where am I\" - Pt tearful and confused upon writer/MCLEOD arrival    Able to re-orientate after some time, but still confused, poor carryover from earlier in session    OBJECTIVE  Precautions: Bed/chair alarm;Spine    WEIGHT BEARING RESTRICTION  Weight Bearing Restriction: None                PAIN ASSESSMENT   Ratin  Location: denied       BALANCE                                                                                                                       Static Sitting: Fair -  Dynamic Sitting: Poor +           Static Standing: Poor  Dynamic Standing: Poor    ACTIVITY TOLERANCE                         O2 WALK         AM-PAC '6-Clicks' INPATIENT SHORT FORM - BASIC MOBILITY  How much difficulty does the patient currently have...  Patient Difficulty: Turning over in bed (including adjusting bedclothes, sheets and blankets)?: A Lot   Patient Difficulty: Sitting down on and standing up from a chair with arms (e.g., wheelchair, bedside commode, etc.): A Lot   Patient Difficulty: Moving from lying on back to sitting on the side of the bed?: A Lot   How much help from another person does the patient currently need...   Help from Another: Moving to and from a bed to a chair (including a wheelchair)?: A Lot   Help from Another: Need to walk in hospital room?: A Lot   Help from Another: Climbing 3-5 steps with a railing?: Total       AM-PAC Score:  Raw Score: 11   Approx Degree of Impairment: 72.57%   Standardized Score (AM-PAC Scale): 33.86   CMS Modifier (G-Code): CL    FUNCTIONAL ABILITY STATUS  Gait Assessment   Functional Mobility/Gait Assessment  Gait Assistance: Not tested  Distance (ft): 0    Skilled Therapy Provided    Bed Mobility:  Rolling:   Supine<>Sit: Mod A (HOB  elevated)  Sit<>Supine:     Transfer Mobility:  Sit<>Stand: Max A x 2 (posterior lean)  Stand<>Sit: Max A x 2   Gait: attempted a few steps with HHA x 2 person - unsafe, and Pt reported \"fear\"    Therapist's Comments:     Continued L side inattention, LUE/LLE chorea like movements and inability to maintain LLE in contact with floor - L foot sliding anteriorly when attempting to stand from BS recliner.         Patient End of Session: Up in chair;Needs met;Call light within reach;RN aware of session/findings;All patient questions and concerns addressed;Alarm set    PT Session Time: 23 minutes  Therapeutic Activity: 15 minutes  Gait Trainin minutes              Physical Therapy Note signed by Stacey Handley PTA at 2024 12:10 PM  Version 1 of 1    Author: Stacey Handley PTA Service: Rehab Author Type: Physical Therapy Assistant    Filed: 2024 12:10 PM Date of Service: 2024 12:00 PM Status: Signed    : Stacey Handley PTA (Physical Therapy Assistant)          PHYSICAL THERAPY TREATMENT NOTE - INPATIENT    Room Number: 3616/3616-A     Session: 2     Number of Visits to Meet Established Goals: 5    Presenting Problem: s/p fall off couch, Closed compression ofx of L2 vertebra, Acute CVA  Co-Morbidities : CAD, HTN, scoliosis (per xray L spine)    PHYSICAL THERAPY MEDICAL/SOCIAL HISTORY  History related to current admission: Patient is a 89 year old female admitted on 2024 from home for fall off couch with c/o back pain.  Pt diagnosed with L2 compression fracture.     HOME SITUATION  Type of Home: House   Home Layout: One level  Stairs to Enter : 2     Lives With: Alone  Drives: Yes  Patient Owned Equipment: Rolling walker  Patient Regularly Uses: Reading glasses     Prior Level of [place]:   Pt questionable historian- reported she lived in house with her spouse, ambulates with rollator, unable to tell much more.  Writer called daughter (Itzel)- pt lives in [facility],  stays on main floor of home. Lives alone, but dtr (Itzel) visits everyday. Supposed to ambulate with RW at all times. Up until now she was doing everything on her own- sponge bathing, dressing, etc.   Drives- just passed her drivers test and renewed her license.   Does laundry one in a while, otherwise dtr does it for her. Daughter brings over meals each day.   Writes her own checks for her bills.   Had recent admission at [facility] for \"mild heart attack\" (per dtr) and had a change in meds.    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   XR LUMBAR SPINE:   There is mild to moderate levoscoliosis of the lumbar spine.  There is no acute fracture dislocation.  There is diffuse osteopenia.  There is moderate degenerative disc disease throughout the lumbar spine most pronounced at L3-4.  There is mild to   moderate facet joint degenerative changes on the right greater than left at L3-4 L4-5 and L5-S1.  There is mild depression of the superior endplate of L2 which is an age indeterminate compression fracture.     5/27 MRI Brain    FINDINGS:       Examination is essentially nondiagnostic.  There is a hemorrhagic infarct within the right parietal lobe as noted on the head CT from earlier on 5/27/2024.  This measures 4.3 x 3.5 cm in the axial plane.      There are separate punctate acute infarcts within the parasagittal frontal lobes.      There is moderate prominence of the lateral ventricles.      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  A component this finding could be developmental.      Chronic sphenoid sinusitis.       ASSESSMENT   Patient demonstrates fair progress this session, goals  remain in progress.    Patient continues to function below baseline with bed mobility, transfers, and gait.  Contributing factors to remaining limitations include decreased endurance/aerobic capacity, pain, impaired Standing and sitting balance, impaired coordination,  impaired motor planning, decreased muscular endurance, and cognitive deficits (delayed processing/[place]).  Next session anticipate patient to progress bed mobility, transfers, and gait.  Physical Therapy will continue to follow patient for duration of hospitalization.    Patient continues to benefit from continued skilled PT services: to facilitate return to prior level of function as patient demonstrates high motivation with excellent tolerance to an intensive therapy program .    PT Treatment Plan: Bed mobility;Body mechanics;Coordination;Endurance;Energy conservation;Patient education;Family education;Gait training;Range of motion;Neuromuscular re-educate;Strengthening;Stoop training;Stair training;Transfer training;Balance training  Rehab Potential : Fair  Frequency (Obs): 3-5x/week    CURRENT GOALS     Goal #1 Patient is able to demonstrate supine - sit EOB @ level: supervision      Goal #2 Patient is able to demonstrate transfers Sit to/from Stand at assistance level: supervision      Goal #3 Patient is able to ambulate 50 feet with assist device: walker - rolling at assistance level: minimum assistance      Goal #4     Goal #5     Goal #6     Goal Comments: Goals established on 2024 all goals ongoing    SUBJECTIVE  \"Get to the chair?\"    OBJECTIVE  Precautions: Bed/chair alarm;Spine    WEIGHT BEARING RESTRICTION  Weight Bearing Restriction: None                PAIN ASSESSMENT   Ratin  Location: denied       BALANCE                                                                                                                       Static Sitting: Fair -  Dynamic Sitting: Poor +           Static Standing: Poor  Dynamic Standing: Poor    ACTIVITY TOLERANCE                         O2 WALK         AM-PAC '6-Clicks' INPATIENT SHORT FORM - BASIC MOBILITY  How much difficulty does the patient currently have...  Patient Difficulty: Turning over in bed (including adjusting bedclothes, sheets and  blankets)?: A Lot   Patient Difficulty: Sitting down on and standing up from a chair with arms (e.g., wheelchair, bedside commode, etc.): A Lot   Patient Difficulty: Moving from lying on back to sitting on the side of the bed?: A Lot   How much help from another person does the patient currently need...   Help from Another: Moving to and from a bed to a chair (including a wheelchair)?: A Lot   Help from Another: Need to walk in hospital room?: A Lot   Help from Another: Climbing 3-5 steps with a railing?: Total       AM-PAC Score:  Raw Score: 11   Approx Degree of Impairment: 72.57%   Standardized Score (AM-PAC Scale): 33.86   CMS Modifier (G-Code): CL    FUNCTIONAL ABILITY STATUS  Gait Assessment   Functional Mobility/Gait Assessment  Gait Assistance: Not tested  Distance (ft): 0    Skilled Therapy Provided    Bed Mobility:  Rolling: Max A   Supine<>Sit: Max A (HOB flat)  Sit<>Supine:     Transfer Mobility:  Sit<>Stand: Max A (posterior lean)  Stand<>Sit: Max A   Gait: NT    Therapist's Comments:     Pt able to safely stand with Max A x 1 to Selam Steady    Continued L side inattention, LUE/LLE chorea like movements and inability to maintain LLE in contact with floor/surface of lift.     Pt able to stand x 4 attempts to selam steady d/t incontinence - Pt would require Max A - Total A for static standing balance and increased chore-like movements/ataxia.      Pt is unsafe to ambulate at this time, or pivot to chair - recommend selam steady.  PCT Fortino witnessed use of lift, and reported confidence in usage.       Patient End of Session: Up in chair;Needs met;Call light within reach;RN aware of session/findings;All patient questions and concerns addressed;Alarm set    PT Session Time: 23 minutes  Therapeutic Activity: 15 minutes  NMRE: 8 minutes                       Occupational Therapy Notes (last 72 hours)      Occupational Therapy Note signed by Vicente Lama OT at 5/31/2024  9:08 AM  Version 1 of 1    Author:  Tu[place], River, OT Service: Rehab Author Type: Occupational Therapist    Filed: 5/31/2024  9:08 AM Date of Service: 5/31/2024  8:35 AM Status: Signed    : Vicente Lama OT (Occupational Therapist)       OCCUPATIONAL THERAPY TREATMENT NOTE - INPATIENT     Room Number: 3616/3616-A  Session: 2   Number of Visits to Meet Established Goals: 7    Presenting Problem: L2 compression fx; Acute R CVA  Prior Level of Function: Per daughter, pt is independent with ADL and mobility via walker. She sponge bathes. She drives and manages her own finances. Daughter visits daily but is often at work. Above home info per daughter.      PT Alison called daughter. Per PT:   \"pt lives in two [facility], stays on main floor of home. Lives alone, but dtr (Itzel) visits everyday. Supposed to ambulate with RW at all times. Up until now she was doing everything on her own- sponge bathing, dressing, etc.   Drives- just passed her drivers test and renewed her license.   Does laundry one in a while, otherwise dtr does it for her. Daughter brings over meals each day.   Writes her own checks for her bills.   Had recent admission at [facility] for \"mild heart attack\" (per dtr) and had a change in meds.\"     ASSESSMENT   Patient demonstrates limited progress this session, goals remain in progress.    Patient continues to function below baseline with toileting, lower body dressing, brace management, bed mobility, transfers, stating sitting balance, dynamic sitting balance, static standing balance, dynamic standing balance, maintaining seated position, and functional standing tolerance.   Contributing factors to remaining limitations include decreased functional strength, decreased functional reach, decreased endurance, impaired standing balance, impaired coordination, impaired motor planning, decreased muscular endurance, cognitive deficits ( ), decreased insight to deficits, and decreased safety awareness.  Next session anticipate  patient to progress bed mobility, transfers, static standing balance, dynamic standing balance, and functional standing tolerance.  Occupational Therapy will continue to follow patient for duration of hospitalization.    Patient continues to benefit from continued skilled OT services: to facilitate return to prior level of function as patient demonstrates high motivation with excellent tolerance to an intensive therapy program .          OT Device Recommendations: TBD    History: Patient is a 89 year old female admitted on 5/26/2024 with Presenting Problem: L2 fx, rececnt admit to [facility] for fall and heart attack.. 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   XR LUMBAR SPINE:   There is mild to moderate levoscoliosis of the lumbar spine.  There is no acute fracture dislocation.  There is diffuse osteopenia.  There is moderate degenerative disc disease throughout the lumbar spine most pronounced at L3-4.  There is mild to   moderate facet joint degenerative changes on the right greater than left at L3-4 L4-5 and L5-S1.  There is mild depression of the superior endplate of L2 which is an age indeterminate compression fracture.      5/27 MRI Brain     FINDINGS:       Examination is essentially nondiagnostic.  There is a hemorrhagic infarct within the right parietal lobe as noted on the head CT from earlier on 5/27/2024.  This measures 4.3 x 3.5 cm in the axial plane.      There are separate punctate acute infarcts within the parasagittal frontal lobes.      There is moderate prominence of the lateral ventricles.      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  A component this finding could be developmental.      Chronic sphenoid sinusitis.   WEIGHT BEARING RESTRICTION  Weight Bearing Restriction: None                Recommendations for nursing staff:   Transfers: selam leone  Toileting location:  bed level    TREATMENT SESSION:  Patient Start of Session: semi supine in bed  FUNCTIONAL TRANSFER ASSESSMENT  Sit to Stand: Edge of Bed; Chair  Edge of Bed: Maximum Assist  Chair: Maximum Assist    BED MOBILITY  Rolling: Maximum Assist  Supine to Sit : Moderate Assist  Sit to Supine (OT): Not Tested  Scooting: Max A    BALANCE ASSESSMENT  Static Sitting: Minimal Assist  Sitting Unilateral: Minimal Assist  Static Standing: Maximum Assist  Standing Unilateral: Maximum Assist    FUNCTIONAL ADL ASSESSMENT  Eating: Not Tested      ACTIVITY TOLERANCE: fair                         O2 SATURATIONS       EDUCATION PROVIDED  Patient: Role of Occupational Therapy; Plan of Care; Discharge Recommendations; Functional Transfer Techniques; Fall Prevention; Posture/Positioning; Energy Conservation; Proper Body Mechanics  Patient's Response to Education: Verbalized Understanding; Requires Further Education  Family/Caregiver: Role of Occupational Therapy; Plan of Care  Family/Caregiver's Response to Education: Verbalized Understanding    Therapist comments: Pt received semi supine in bed. Upon this writer's arrival, pt very emotional. Pt re-oriented to location and reason why she's in the hospital. Pt declining that she had a fall. Pt requesting for her daughter. Daughter was called and was updated for therapy plan. Pt performs bed mobility at mod A to sit EOB. While seated EOB, pt was assisted in donning LSO brace. Pt then performs sit to stand transfer (pulling from selam stedy) requiring max A and transfers to bedside chair in preparation to trial transferring to bedside commode. Pt then engages in trials of sit to stand transfer from chair height with pt requiring max A x 2 at hand held. Attempted to trial functional mobility, however, pt is unable to sequence or sustain static standing safely. Pt left in chair with all needs.  Patient End of Session: Up in chair;Needs met;Call light within reach;RN aware of session/findings;All  patient questions and concerns addressed;Alarm set    SUBJECTIVE  \"Baba (Itzel, pt's daughter).\"    PAIN ASSESSMENT  Rating: Unable to rate  Location: pain with transfers  Management Techniques: Body mechanics;Activity promotion;Repositioning     OBJECTIVE  Precautions: Bed/chair alarm;Spine    AM-PAC ‘6-Clicks’ Inpatient Daily Activity Short Form  -   Putting on and taking off regular lower body clothing?: A Lot  -   Bathing (including washing, rinsing, drying)?: A Lot  -   Toileting, which includes using toilet, bedpan or urinal? : A Lot  -   Putting on and taking off regular upper body clothing?: A Lot  -   Taking care of personal grooming such as brushing teeth?: A Little  -   Eating meals?: A Little    AM-PAC Score:  Score: 14  Approx Degree of Impairment: 59.67%  Standardized Score (AM-PAC Scale): 33.39    PLAN  OT Treatment Plan: Balance activities;Energy conservation/work simplification techniques;ADL training;Functional transfer training;Visual perceptual training;UE strengthening/ROM;Endurance training;Cognitive reorientation;Patient/Family education;Equipment eval/education;Patient/Family training;Neuromuscluar reeducation;Fine motor coordination activities;Compensatory technique education  Rehab Potential : Good  Frequency: 3-5x/week    OT Goals:     ADL Goals  Patient will perform toileting with mod (A) and AE PRN  Patient will perform LB dressing with mod (A) and AE PRN     Functional Transfer Goals  Patient will perform bed mobility supine to sit with mod (A)  Patient will perform bed mobility sit to supine with mod (A)  Patient will perform commode transfer with mod (A)     Additional Goals:  Patient will maintain spine precautions during ADL with cueing prn  Pt will sit EOB CGA 5 minutes    OT Session Time: 25 minutes  Therapeutic Activity: 25 minutes                   Video Swallow Study Notes    No notes of this type exist for this encounter.     SLP Notes    No notes of this type exist for this  encounter.     Multidisciplinary Problems     Active Goals        Problem: Patient/Family Goals    Goal Priority Disciplines Outcome Interventions   Patient/Family Long Term Goal     Interdisciplinary Progressing    Description: Patient's Long Term Goal: No pain    Interventions:  - Pain meds PRN   - Repositioning  - PT/OT  - See additional Care Plan goals for specific interventions      Patient/Family Short Term Goal     Interdisciplinary Progressing    Description: Patient's Short Term Goal: Less pain    Interventions:   - Pain meds PRN   - Repositioning  - PT/OT  - See additional Care Plan goals for specific interventions

## (undated) NOTE — IP AVS SNAPSHOT
Patient Demographics     Address  [address] [address] Phone  [phone] [facility])  [phone] [facility]      Emergency Contact(s)     Name Relation Home Work Srikanth Chavez [phone]      [place] contact your surgeon’s office:    Tylenol (Acetaminophen): Take this as prescribed 3 times per day until your first postoperative appointment. Do NOT exceed 4g (4000mg) of acetaminophen daily. *We will discuss pain management and weaning off pain medicine a or as needed, to your incision to help control pain and swelling. Do not apply directly to your skin. ?  For the first 7 days after you leave the hospital, it is critical that you elevate your leg above the level of your heart 4 times per day for 1 hour ea While this waterproof dressing is in place, you may shower and let water run over the dressing (ensure first that the dressing seal is intact and none of the edges have rolled up exposing the wound – if the dressing has become open to the environment, do n ? Take your vitals and draw your labs. Remember, good hand washing with soap at all times helps prevent infections. Wash your hands with soap and water prior to performing any dressing changes or wound evaluation. Medications/Special Instructions:  ? example: wound issues, pain, swelling, redness or drainage) call our office FIRST at [phone] so that we may appropriately direct you or set up a clinic appointment. Follow-up Information     Adamaris Ernst.  Schedule an appointment as soon as po Next dose due:  04/01/20 EVENING      Take 1 tablet (17.2 mg total) by mouth nightly. Juanito Guidry MD         simvastatin 20 MG Tabs  Commonly known as:  ZOCOR  Next dose due:  04/01/20 pm      Take 20 mg by mouth nightly.                 Where to Get Y CBC WITH DIFFERENTIAL WITH PLATELET [[id number]]  Resulted: 04/01/20 0659, Result status: Final result   Ordering provider:  Rashmi Engel NP  03/31/20 2300 Resulting lab:  [facility]   Narrative:   The following orders were created for panel order CBC History of Present Illness:[RM.1] Dario Anderson[RM.2] is a[RM.3 80year old[RM.2] female with past medical history of coronary artery disease with stent placed in 2003, hypertension hyperlipidemia    Patient still works at [employer] Physical Exam:[RM.1]    /56   Pulse 62   Temp 98.2 °F (36.8 °C) (Temporal)   Resp 17   Ht 5' 5\" (1.651 m)   Wt 95 lb (43.1 kg)   SpO2 97%   BMI 15.81 kg/m²[RM. 2]   General: No acute distress. Alert and oriented x 3. HEENT: Normocephalic atraumatic. Plan of care discussed with patient in ED    Leonard Shepard DO  3/29/2020[RM. 1]                        Electronically signed by Tabatha Calhoun DO on 3/29/2020  7:10 PM   Attribution Aguayo    RM. 1 - Ricardo Mccauley DO on 3/29/2020  6:59 PM  RM. 2 - Tabatha Calhoun Physical Exam:[RS.1]    BP (!) 115/97 (BP Location: Left arm)   Pulse 56   Temp 98.2 °F (36.8 °C) (Oral)   Resp 18   Ht 65\"   Wt 95 lb (43.1 kg)   SpO2 98%   BMI 15.81 kg/m²[RS.2]   Constitutional: awake, alert, pleasant, NAD  Psychological: appropriate a proceed with surgical management. Discussed risks and benefits of hemiarthroplasty and total hip arthroplasty, including length of surgery, blood loss, instability/dislocation, progression of arthritis, pain, need for further or revision surgery.  Patient e Fall from standing, initial encounter    Essential hypertension    Hyperlipidemia    CAD (coronary artery disease)      Past Medical History  Past Medical History:   Diagnosis Date   • Coronary atherosclerosis    • Essential hypertension    • High blood Standardized Score (AM-PAC Scale): 42.13   CMS Modifier (G-Code): CK    FUNCTIONAL ABILITY STATUS  Gait Assessment   Gait Assistance: Minimum assistance  Distance (ft): 150  Assistive Device: Rolling walker  Pattern: L Decreased stance time; Ataxic(frequent Position[SP.1] Supine[SP.2]     Repetitions[SP.1]   10[SP.2]   Sets[SP.1]   1[SP.2]     Patient End of Session: Up in chair;Needs met;Call light within reach;RN aware of session/findings; All patient questions and concerns addressed;SCDs in place; Alarm s rolling at assistance level: modified independent      Goal #4 Pt to demonstrate good safety awareness, able to demonstrate good understanding of precautions   Goal #5     Goal #6     Goal Comments: Goals established on 3/31/2020[SP.4]       Attribution Ke Past Surgical History  Past Surgical History:   Procedure Laterality Date   • CEMENTED TOTAL HIP REPLACEMENT/HEMIARTHROPLASTY Left 3/30/2020    Performed by Jacque Shepherd MD at [address] [place] [address] Road  \"I would love to get up\"[SP.2]    Milagros Stoop/Curb Assistance: Not tested       Skilled Therapy Provided: Pt recd in supine, educated in goals for session.[SP.1]  Pt unable to recite hip precautions, re educated and provided with written instructions.   Performed bilat LE therex with assist, good Pt cont to present with left LE pain, dec left LE ROM and strength, impaired static and dynamic standing balance, impaired safety awareness with impaired insight into her deficits,  and pt remains a high fall risk.[SP.2]  The patient scores 7/20 on the Media SP.3 Surinder Avalos, PT on 4/1/2020  1:32 PM  SP.4 - Agusto Lopes, PT on 4/1/2020  1:24 PM               Physical Therapy Note signed by Agusto Lopes PT at 4/1/2020  1:25 PM  Version 1 of 3    Author:  Agusto Lopes PT Service:  Rehab Author T PAIN ASSESSMENT   Ratin  Location: left hip, thigh  Management Techniques: Activity promotion; Body mechanics    BALANCE                                                                                                                       Static Sitting ambulation. Pt returned to room, made comfortable in recliner. Again discussed recommendation for ASHER upon edw dc, Pt reporting that she does not want to go, wants to go home.   Discussed this writer's concerns about pt safety, pt cont to state that she i level.  The AM-PAC '6-Clicks' Inpatient Basic Mobility Short Form was completed and this patient is demonstrating a 50.57% degree of impairment in mobility. Research supports that patients with this level of impairment may benefit from ASHER.     Pt cont to r Type of Evaluation: Initial  Physician Order: PT Eval and Treat    Presenting Problem: s/p left posterior cemented hemiarthroplasty 3/30/2020  Reason for Therapy: Mobility Dysfunction and Discharge Planning    History related to current admission: Pt admit Weight Bearing Restriction: L lower extremity           L Lower Extremity: Weight Bearing as Tolerated    PAIN ASSESSMENT  Ratin  Location: denies pain at this time  Management Techniques: Activity promotion; Body mechanics;Repositioning    COGNITION[SP How much help from another person does the patient currently need. ..   -   Moving to and from a bed to a chair (including a wheelchair)?: A Little   -   Need to walk in hospital room?: A Lot   -   Climbing 3-5 steps with a railing?: A Lot       AM-PAC Scor Patient is a 80year old female admitted on 3/29/2020 for[SP.1] fall resulting in left hip fracture, s/p left cemented hemiarthroplasty 3/30/2020[SP.2]. Pertinent comorbidities and personal factors impacting therapy include[SP.1] advanced age, htn[SP.2]. Goal #4[SP.1] Pt to demonstrate good safety awareness, able to demonstrate good understanding of precautions[SP. 2]   Goal #5    Goal #6    Goal Comments: Goals established on 3/31/2020[SP.1]     Attribution Aguayo    SP.1 - David Trent, PT on 3/31/2020 11 • High blood pressure    • High cholesterol[LD.2]        Past Surgical History[LD.1]  Past Surgical History:   Procedure Laterality Date   • CEMENTED TOTAL HIP REPLACEMENT/HEMIARTHROPLASTY Left 3/30/2020    Performed by Gretel Llamas MD at [facility] MAIN OR[LD. O2 SATURATIONS                ACTIVITIES OF DAILY LIVING ASSESSMENT  AM-PAC ‘6-Clicks’ Inpatient Daily Activity Short Form  How much help from another person does the patient currently need…[LD.1]  -   Putting on and taking off regular addressed; Alarm set[LD. 2]    ASSESSMENT     Patient is a[LD.3 80year old[LD.2] female admitted on 3/29/2020 for[LD.1] L hip cemented hemiarthroplasty on 3/30[LD.2]. Complete medical history and occupational profile noted above.  Functional outcome measure training;Equipment eval/education; Compensatory technique education  Rehab Potential : Fair  Frequency (Obs): 5x/week  Number of Visits to Meet Established Goals: 5[LD. 2]    ADL Goals[LD.1]   Patient will perform lower body dressing:  with min assist  Patie History related to current admission:[LD.1] Pt is[LD.3 80year old[LD.3] female admitted on 3/29/2020 from work at [employer] after she fell and sustained a L hip fracture. Pt is now s/p L hip cemented hemiarthroplasty on 3/30.        Therapy significant co-mor WEIGHT BEARING RESTRICTION[LD.1]  Weight Bearing Restriction: L lower extremity           L Lower Extremity: Weight Bearing as Tolerated[LD.2]    PAIN ASSESSMENT[LD.1]  Ratin  Location: N/A[LD.2]       COGNITION[LD.1]  Safety Judgement:  decreased awar for safety with RW use and hand placement. Pt performed functional mobility with min assistance and RW x approx 50ft>bathroom.  Pt performed a toilet T/F with min assist and toileting including ji-care and clothing management with min assist. Pt performed history including review of medical or therapy record[LD.2]   Specific performance deficits impacting engagement in ADL/IADL[LD.1] MODERATE  3 - 5 performance deficits[LD.2]   Client Assessment/Performance Deficits[LD. 1] MODERATE - Comorbidities and min to :  Satish Warren (Occupational Therapist)    Related Notes:  Addendum by Satish Warren (Occupational Therapist) filed at 4/1/2020  6:36 AM       OCCUPATIONAL THERAPY EVALUATION - INPATIENT     Room Number: 2367/1534-U  Evaluation Date: 3/31/2020 Prior Level of Function:[LD.1] Pt lives at home with her  who has his own medical issues but pt reports he is fairly independent and does not use an AD.  Pt is typically independent with ADL and functional mobility without an AD, drives a car and wor Standardized Score (AM-PAC Scale): 37.26  CMS Modifier (G-Code): CK[LD.2]    FUNCTIONAL TRANSFER ASSESSMENT[LD.1]  Supine to Sit : Contact guard assist  Sit to Stand: Minimum assistance[LD.2]    Skilled Therapy Provided:[LD.1] Pt was found in bed in a semi The patient is functioning below her previous functional level and would benefit from skilled inpatient OT to address the above deficits, maximizing patient’s ability to return safely to her prior level of function. [LD.1]    Subacute rehab is recommended(p Pt will demo good safety awareness with RW use during functional transfers. [LD.2]      Attribution Key    LD. 1 - Dami Moore on 4/1/2020  6:00 AM  LD. 2 - Dami Moore on 4/1/2020  6:24 AM  LD. 3 - Dami Moore on 4/1/2020  6:27 AM deficits discovered; clear vocal quality, volitional swallow and productive cough elicited.  Head of bed elevated to 90 degrees and pt observed feeding herself po trials of thin via cup & straw, pureed, hard solids and 3 whole pills taken one at a time with Past Medical History  Past Medical History:   Diagnosis Date   • Coronary atherosclerosis    • Essential hypertension    • High blood pressure    • High cholesterol        Prior Living Situation: Home with spouse  Diet Prior to Admission: Regular; Thin li Alternate liquids/solids, Upright 90 degrees 30 mins after meal with as needed assistance 95 % of the time across 2 sessions.   New goal                              FOLLOW UP  Treatment Plan/Recommendations: Dysphagia therapy  Number of Visits to Meet [facility]

## (undated) NOTE — LETTER
[address]  [address]  Authorization for Surgical Operation and Procedure     Date:___________                                                                                                         Time:__________  I hereby authorize * Surgery not found *, my physician and his/her assistants (if applicable), which may include medical students, residents, and/or fellows, to perform the following surgical operation/ procedure and administer such anesthesia as may be determined necessary by my physician:  Operation/Procedure name (s)  on Tierra Anderson   2.   I recognize that during the surgical operation/procedure, unforeseen conditions may necessitate additional or different procedures than those listed above.  I, therefore, further authorize and request that the above-named surgeon, assistants, or designees perform such procedures as are, in their judgment, necessary and desirable.    3.   My surgeon/physician has discussed prior to my surgery the potential benefits, risks and side effects of this procedure; the likelihood of achieving goals; and potential problems that might occur during recuperation.  They also discussed reasonable alternatives to the procedure, including risks, benefits, and side effects related to the alternatives and risks related to not receiving this procedure.  I have had all my questions answered and I acknowledge that no guarantee has been made as to the result that may be obtained.    4.   Should the need arise during my operation/procedure, which includes change of level of care prior to discharge, I also consent to the administration of blood and/or blood products.  Further, I understand that despite careful testing and screening of blood or blood products by collecting agencies, I may still be subject to ill effects as a result of receiving a blood transfusion and/or blood products.  The following are some, but not all, of the potential risks  that can occur: fever and allergic reactions, hemolytic reactions, transmission of diseases such as Hepatitis, AIDS and Cytomegalovirus (CMV) and fluid overload.  In the event that I wish to have an autologous transfusion of my own blood, or a directed donor transfusion, I will discuss this with my physician.  Check only if Refusing Blood or Blood Products  I understand refusal of blood or blood products as deemed necessary by my physician may have serious consequences to my condition to include possible death. I hereby assume responsibility for my refusal and release the hospital, its personnel, and my physicians from any responsibility for the consequences of my refusal.          o  Refuse      5.   I authorize the use of any specimen, organs, tissues, body parts or foreign objects that may be removed from my body during the operation/procedure for diagnosis, research or teaching purposes and their subsequent disposal by hospital authorities.  I also authorize the release of specimen test results and/or written reports to my treating physician on the hospital medical staff or other referring or consulting physicians involved in my care, at the discretion of the Pathologist or my treating physician.    6.   I consent to the photographing or videotaping of the operations or procedures to be performed, including appropriate portions of my body for medical, scientific, or educational purposes, provided my identity is not revealed by the pictures or by descriptive texts accompanying them.  If the procedure has been photographed/videotaped, the surgeon will obtain the original picture, image, videotape or CD.  The hospital will not be responsible for storage, release or maintenance of the picture, image, tape or CD.    7.   I consent to the presence of a  or observers in the operating room as deemed necessary by my physician or their designees.    8.   I recognize that in the event my procedure results  in extended X-Ray/fluoroscopy time, I may develop a skin reaction.    9. If I have a Do Not Attempt Resuscitation (DNAR) order in place, that status will be suspended while in the operating room, procedural suite, and during the recovery period unless otherwise explicitly stated by me (or a person authorized to consent on my behalf). The surgeon or my attending physician will determine when the applicable recovery period ends for purposes of reinstating the DNAR order.  10. Patients having a sterilization procedure: I understand that if the procedure is successful the results will be permanent and it will therefore be impossible for me to inseminate, conceive, or bear children.  I also understand that the procedure is intended to result in sterility, although the result has not been guaranteed.   11. I acknowledge that my physician has explained sedation/analgesia administration to me including the risk and benefits I consent to the administration of sedation/analgesia as may be necessary or desirable in the judgment of my physician.    I CERTIFY THAT I HAVE READ AND FULLY UNDERSTAND THE ABOVE CONSENT TO OPERATION and/or OTHER PROCEDURE.    _________________________________________  __________________________________  Signature of Patient     Signature of Responsible Person         ___________________________________         Printed Name of Responsible Person           _________________________________                 Relationship to Patient  _________________________________________  ______________________________  Signature of Witness          Date  Time      Patient Name: Tierra Anderson     : 1927                 Printed: May 28, 2024     Medical Record #: [id number]                     Page 1 [address]  [address]    Consent for Anesthesia    I, Tierra Anderson agree to be cared for by an anesthesiologist, who is specially  trained to monitor me and give me medicine to put me to sleep or keep me comfortable during my procedure    I understand that my anesthesiologist is not an employee or agent of [facility] or [employer]. He or she works for [employer].    As the patient asking for anesthesia services, I agree to:  Allow the anesthesiologist (anesthesia doctor) to give me medicine and do additional procedures as necessary. Some examples are: Starting or using an “IV” to give me medicine, fluids or blood during my procedure, and having a breathing tube placed to help me breathe when I’m asleep (intubation). In the event that my heart stops working properly, I understand that my anesthesiologist will make every effort to sustain my life, unless otherwise directed by [facility] Do Not Resuscitate documents.  Tell my anesthesia doctor before my procedure:  If I am pregnant.  The last time that I ate or drank.  All of the medicines I take (including prescriptions, herbal supplements, and pills I can buy without a prescription (including street drugs/illegal medications). Failure to inform my anesthesiologist about these medicines may increase my risk of anesthetic complications.  If I am allergic to anything or have had a reaction to anesthesia before.  I understand how the anesthesia medicine will help me (benefits).  I understand that with any type of anesthesia medicine there are risks:  The most common risks are: nausea, vomiting, sore throat, muscle soreness, damage to my eyes, mouth, or teeth (from breathing tube placement).  Rare risks include: remembering what happened during my procedure, allergic reactions to medications, injury to my airway, heart, lungs, vision, nerves, or muscles and in extremely rare instances death.  My doctor has explained to me other choices available to me for my care (alternatives).  Pregnant Patients (“epidural”):  I understand that the risks of having an  epidural (medicine given into my back to help control pain during labor), include itching, low blood pressure, difficulty urinating, headache or slowing of the baby’s heart. Very rare risks include infection, bleeding, seizure, irregular heart rhythms and nerve injury.  Regional Anesthesia (“spinal”, “epidural”, & “nerve blocks”):  I understand that rare but potential complications include headache, bleeding, infection, seizure, irregular heart rhythms, and nerve injury.    I can change my mind about having anesthesia services at any time before I get the medicine.    _____________________________________________________________________________  Patient (or Representative) Signature/Relationship to Patient  Date   Time    _____________________________________________________________________________   Name (if used)    Language/Organization   Time    _____________________________________________________________________________  Anesthesiologist Signature     Date   Time  I have discussed the procedure and information above with the patient (or patient’s representative) and answered their questions. The patient or their representative has agreed to have anesthesia services.    _____________________________________________________________________________  Witness        Date   Time  I have verified that the signature is that of the patient or patient’s representative, and that it was signed before the procedure  Patient Name: Tierra Anderson     : 1927                 Printed: May 28, 2024     Medical Record #: [id number]                     Page 2 of 2

## (undated) NOTE — LETTER
[address]  [address]  Authorization for Surgical Operation and Procedure     Date:___________                                                                                                         Time:__________  I hereby authorize * Surgery not found *, my physician and his/her assistants (if applicable), which may include medical students, residents, and/or fellows, to perform the following surgical operation/ procedure and administer such anesthesia as may be determined necessary by my physician:  Operation/Procedure name (s)  on Tierra Anderson   2.   I recognize that during the surgical operation/procedure, unforeseen conditions may necessitate additional or different procedures than those listed above.  I, therefore, further authorize and request that the above-named surgeon, assistants, or designees perform such procedures as are, in their judgment, necessary and desirable.    3.   My surgeon/physician has discussed prior to my surgery the potential benefits, risks and side effects of this procedure; the likelihood of achieving goals; and potential problems that might occur during recuperation.  They also discussed reasonable alternatives to the procedure, including risks, benefits, and side effects related to the alternatives and risks related to not receiving this procedure.  I have had all my questions answered and I acknowledge that no guarantee has been made as to the result that may be obtained.    4.   Should the need arise during my operation/procedure, which includes change of level of care prior to discharge, I also consent to the administration of blood and/or blood products.  Further, I understand that despite careful testing and screening of blood or blood products by collecting agencies, I may still be subject to ill effects as a result of receiving a blood transfusion and/or blood products.  The following are some, but not all, of the potential risks  that can occur: fever and allergic reactions, hemolytic reactions, transmission of diseases such as Hepatitis, AIDS and Cytomegalovirus (CMV) and fluid overload.  In the event that I wish to have an autologous transfusion of my own blood, or a directed donor transfusion, I will discuss this with my physician.  Check only if Refusing Blood or Blood Products  I understand refusal of blood or blood products as deemed necessary by my physician may have serious consequences to my condition to include possible death. I hereby assume responsibility for my refusal and release the hospital, its personnel, and my physicians from any responsibility for the consequences of my refusal.          o  Refuse      5.   I authorize the use of any specimen, organs, tissues, body parts or foreign objects that may be removed from my body during the operation/procedure for diagnosis, research or teaching purposes and their subsequent disposal by hospital authorities.  I also authorize the release of specimen test results and/or written reports to my treating physician on the hospital medical staff or other referring or consulting physicians involved in my care, at the discretion of the Pathologist or my treating physician.    6.   I consent to the photographing or videotaping of the operations or procedures to be performed, including appropriate portions of my body for medical, scientific, or educational purposes, provided my identity is not revealed by the pictures or by descriptive texts accompanying them.  If the procedure has been photographed/videotaped, the surgeon will obtain the original picture, image, videotape or CD.  The hospital will not be responsible for storage, release or maintenance of the picture, image, tape or CD.    7.   I consent to the presence of a  or observers in the operating room as deemed necessary by my physician or their designees.    8.   I recognize that in the event my procedure results  in extended X-Ray/fluoroscopy time, I may develop a skin reaction.    9. If I have a Do Not Attempt Resuscitation (DNAR) order in place, that status will be suspended while in the operating room, procedural suite, and during the recovery period unless otherwise explicitly stated by me (or a person authorized to consent on my behalf). The surgeon or my attending physician will determine when the applicable recovery period ends for purposes of reinstating the DNAR order.  10. Patients having a sterilization procedure: I understand that if the procedure is successful the results will be permanent and it will therefore be impossible for me to inseminate, conceive, or bear children.  I also understand that the procedure is intended to result in sterility, although the result has not been guaranteed.   11. I acknowledge that my physician has explained sedation/analgesia administration to me including the risk and benefits I consent to the administration of sedation/analgesia as may be necessary or desirable in the judgment of my physician.    I CERTIFY THAT I HAVE READ AND FULLY UNDERSTAND THE ABOVE CONSENT TO OPERATION and/or OTHER PROCEDURE.    _________________________________________  __________________________________  Signature of Patient     Signature of Responsible Person         ___________________________________         Printed Name of Responsible Person           _________________________________                 Relationship to Patient  _________________________________________  ______________________________  Signature of Witness          Date  Time      Patient Name: Tierra Anderson     : 1927                 Printed: May 27, 2024     Medical Record #: [id number]                     Page 1 [address]  [address]    Consent for Anesthesia    I, Tierra Anderson agree to be cared for by an anesthesiologist, who is specially  trained to monitor me and give me medicine to put me to sleep or keep me comfortable during my procedure    I understand that my anesthesiologist is not an employee or agent of [facility] or [employer]. He or she works for [employer].    As the patient asking for anesthesia services, I agree to:  Allow the anesthesiologist (anesthesia doctor) to give me medicine and do additional procedures as necessary. Some examples are: Starting or using an “IV” to give me medicine, fluids or blood during my procedure, and having a breathing tube placed to help me breathe when I’m asleep (intubation). In the event that my heart stops working properly, I understand that my anesthesiologist will make every effort to sustain my life, unless otherwise directed by [facility] Do Not Resuscitate documents.  Tell my anesthesia doctor before my procedure:  If I am pregnant.  The last time that I ate or drank.  All of the medicines I take (including prescriptions, herbal supplements, and pills I can buy without a prescription (including street drugs/illegal medications). Failure to inform my anesthesiologist about these medicines may increase my risk of anesthetic complications.  If I am allergic to anything or have had a reaction to anesthesia before.  I understand how the anesthesia medicine will help me (benefits).  I understand that with any type of anesthesia medicine there are risks:  The most common risks are: nausea, vomiting, sore throat, muscle soreness, damage to my eyes, mouth, or teeth (from breathing tube placement).  Rare risks include: remembering what happened during my procedure, allergic reactions to medications, injury to my airway, heart, lungs, vision, nerves, or muscles and in extremely rare instances death.  My doctor has explained to me other choices available to me for my care (alternatives).  Pregnant Patients (“epidural”):  I understand that the risks of having an  epidural (medicine given into my back to help control pain during labor), include itching, low blood pressure, difficulty urinating, headache or slowing of the baby’s heart. Very rare risks include infection, bleeding, seizure, irregular heart rhythms and nerve injury.  Regional Anesthesia (“spinal”, “epidural”, & “nerve blocks”):  I understand that rare but potential complications include headache, bleeding, infection, seizure, irregular heart rhythms, and nerve injury.    I can change my mind about having anesthesia services at any time before I get the medicine.    _____________________________________________________________________________  Patient (or Representative) Signature/Relationship to Patient  Date   Time    _____________________________________________________________________________   Name (if used)    Language/Organization   Time    _____________________________________________________________________________  Anesthesiologist Signature     Date   Time  I have discussed the procedure and information above with the patient (or patient’s representative) and answered their questions. The patient or their representative has agreed to have anesthesia services.    _____________________________________________________________________________  Witness        Date   Time  I have verified that the signature is that of the patient or patient’s representative, and that it was signed before the procedure  Patient Name: Tierra Anderson     : 1927                 Printed: May 27, 2024     Medical Record #: [id number]                     Page 2 of 2

## (undated) NOTE — LETTER
Lenore Xie [address]  [place], [address] [facility]    Consent for Operation  Date: __________________                                Time: _______________    1.  I authorize the performance upon Es Cid the following operation:  Procedu procedure has been videotaped, the surgeon will obtain the original videotape. The hospital will not be responsible for storage or maintenance of this tape.   7. For the purpose of advancing medical education, I consent to the admittance of observers to the STATEMENTS REQUIRING INSERTION OR COMPLETION WERE FILLED IN.     Signature of Patient:   ___________________________    When the patient is a minor or mentally incompetent to give consent:  Signature of person authorized to consent for patient: ____________ supplements, and pills I can buy without a prescription (including street drugs/illegal medications). Failure to inform my anesthesiologist about these medicines may increase my risk of anesthetic complications. iv.  If I am allergic to anything or have ha Anesthesiologist Signature     Date   Time  I have discussed the procedure and information above with the patient (or patient’s representative) and answered their questions. The patient or their representative has agreed to have anesthesia services.     ___

## (undated) NOTE — ED AVS SNAPSHOT
Danie Webber   MRN: [id number]    Department:  [facility] Emergency Department in [place]   Date of Visit:  8/17/2017           Disclosure     Insurance plans vary and the physician(s) referred by the ER may not be covered by your plan.  Please cont If you have been prescribed any medication(s), please fill your prescription right away and begin taking the medication(s) as directed    If the emergency physician has read X-rays, these will be re-interpreted by a radiologist.  If there is a significant

## (undated) NOTE — LETTER
Lenore Xie [address]  [place], [address] [place] [address]    Consent for Operation  Date: __________________                                Time: _______________    1.  I authorize the performance upon Gisela Handing the following operation:  Procedu procedure has been videotaped, the surgeon will obtain the original videotape. The hospital will not be responsible for storage or maintenance of this tape.   7. For the purpose of advancing medical education, I consent to the admittance of observers to the STATEMENTS REQUIRING INSERTION OR COMPLETION WERE FILLED IN.     Signature of Patient:   ___________________________    When the patient is a minor or mentally incompetent to give consent:  Signature of person authorized to consent for patient: ____________ supplements, and pills I can buy without a prescription (including street drugs/illegal medications). Failure to inform my anesthesiologist about these medicines may increase my risk of anesthetic complications. iv.  If I am allergic to anything or have ha Anesthesiologist Signature     Date   Time  I have discussed the procedure and information above with the patient (or patient’s representative) and answered their questions. The patient or their representative has agreed to have anesthesia services.     ___